# Patient Record
Sex: FEMALE | Race: OTHER | HISPANIC OR LATINO
[De-identification: names, ages, dates, MRNs, and addresses within clinical notes are randomized per-mention and may not be internally consistent; named-entity substitution may affect disease eponyms.]

---

## 2020-02-21 ENCOUNTER — APPOINTMENT (OUTPATIENT)
Dept: INTERNAL MEDICINE | Facility: CLINIC | Age: 60
End: 2020-02-21

## 2020-02-21 PROBLEM — Z00.00 ENCOUNTER FOR PREVENTIVE HEALTH EXAMINATION: Status: ACTIVE | Noted: 2020-02-21

## 2022-07-17 ENCOUNTER — INPATIENT (INPATIENT)
Facility: HOSPITAL | Age: 62
LOS: 3 days | Discharge: HOME | DRG: 312 | End: 2022-07-21
Attending: INTERNAL MEDICINE | Admitting: INTERNAL MEDICINE
Payer: MEDICAID

## 2022-07-17 VITALS
TEMPERATURE: 98 F | HEART RATE: 63 BPM | SYSTOLIC BLOOD PRESSURE: 132 MMHG | OXYGEN SATURATION: 100 % | HEIGHT: 68 IN | DIASTOLIC BLOOD PRESSURE: 80 MMHG | WEIGHT: 130.07 LBS | RESPIRATION RATE: 16 BRPM

## 2022-07-17 DIAGNOSIS — R55 SYNCOPE AND COLLAPSE: ICD-10-CM

## 2022-07-17 DIAGNOSIS — Z29.9 ENCOUNTER FOR PROPHYLACTIC MEASURES, UNSPECIFIED: ICD-10-CM

## 2022-07-17 DIAGNOSIS — E87.1 HYPO-OSMOLALITY AND HYPONATREMIA: ICD-10-CM

## 2022-07-17 DIAGNOSIS — G50.0 TRIGEMINAL NEURALGIA: ICD-10-CM

## 2022-07-17 LAB
ALBUMIN SERPL ELPH-MCNC: 3.6 G/DL — SIGNIFICANT CHANGE UP (ref 3.5–5)
ALP SERPL-CCNC: 125 U/L — HIGH (ref 40–120)
ALT FLD-CCNC: 23 U/L DA — SIGNIFICANT CHANGE UP (ref 10–60)
ANION GAP SERPL CALC-SCNC: 9 MMOL/L — SIGNIFICANT CHANGE UP (ref 5–17)
AST SERPL-CCNC: 19 U/L — SIGNIFICANT CHANGE UP (ref 10–40)
BASOPHILS # BLD AUTO: 0.05 K/UL — SIGNIFICANT CHANGE UP (ref 0–0.2)
BASOPHILS NFR BLD AUTO: 1.1 % — SIGNIFICANT CHANGE UP (ref 0–2)
BILIRUB SERPL-MCNC: 0.3 MG/DL — SIGNIFICANT CHANGE UP (ref 0.2–1.2)
BUN SERPL-MCNC: 13 MG/DL — SIGNIFICANT CHANGE UP (ref 7–18)
CALCIUM SERPL-MCNC: 9.5 MG/DL — SIGNIFICANT CHANGE UP (ref 8.4–10.5)
CHLORIDE SERPL-SCNC: 94 MMOL/L — LOW (ref 96–108)
CO2 SERPL-SCNC: 23 MMOL/L — SIGNIFICANT CHANGE UP (ref 22–31)
CREAT SERPL-MCNC: 0.78 MG/DL — SIGNIFICANT CHANGE UP (ref 0.5–1.3)
EGFR: 87 ML/MIN/1.73M2 — SIGNIFICANT CHANGE UP
EOSINOPHIL # BLD AUTO: 0.13 K/UL — SIGNIFICANT CHANGE UP (ref 0–0.5)
EOSINOPHIL NFR BLD AUTO: 2.8 % — SIGNIFICANT CHANGE UP (ref 0–6)
GLUCOSE SERPL-MCNC: 100 MG/DL — HIGH (ref 70–99)
HCT VFR BLD CALC: 36.6 % — SIGNIFICANT CHANGE UP (ref 34.5–45)
HGB BLD-MCNC: 12.7 G/DL — SIGNIFICANT CHANGE UP (ref 11.5–15.5)
IMM GRANULOCYTES NFR BLD AUTO: 0.4 % — SIGNIFICANT CHANGE UP (ref 0–1.5)
LYMPHOCYTES # BLD AUTO: 1.03 K/UL — SIGNIFICANT CHANGE UP (ref 1–3.3)
LYMPHOCYTES # BLD AUTO: 21.9 % — SIGNIFICANT CHANGE UP (ref 13–44)
MCHC RBC-ENTMCNC: 28.8 PG — SIGNIFICANT CHANGE UP (ref 27–34)
MCHC RBC-ENTMCNC: 34.7 GM/DL — SIGNIFICANT CHANGE UP (ref 32–36)
MCV RBC AUTO: 83 FL — SIGNIFICANT CHANGE UP (ref 80–100)
MONOCYTES # BLD AUTO: 0.28 K/UL — SIGNIFICANT CHANGE UP (ref 0–0.9)
MONOCYTES NFR BLD AUTO: 6 % — SIGNIFICANT CHANGE UP (ref 2–14)
NEUTROPHILS # BLD AUTO: 3.19 K/UL — SIGNIFICANT CHANGE UP (ref 1.8–7.4)
NEUTROPHILS NFR BLD AUTO: 67.8 % — SIGNIFICANT CHANGE UP (ref 43–77)
NRBC # BLD: 0 /100 WBCS — SIGNIFICANT CHANGE UP (ref 0–0)
PLATELET # BLD AUTO: 274 K/UL — SIGNIFICANT CHANGE UP (ref 150–400)
POTASSIUM SERPL-MCNC: 4.1 MMOL/L — SIGNIFICANT CHANGE UP (ref 3.5–5.3)
POTASSIUM SERPL-SCNC: 4.1 MMOL/L — SIGNIFICANT CHANGE UP (ref 3.5–5.3)
PROT SERPL-MCNC: 6.8 G/DL — SIGNIFICANT CHANGE UP (ref 6–8.3)
RBC # BLD: 4.41 M/UL — SIGNIFICANT CHANGE UP (ref 3.8–5.2)
RBC # FLD: 13.4 % — SIGNIFICANT CHANGE UP (ref 10.3–14.5)
SARS-COV-2 RNA SPEC QL NAA+PROBE: SIGNIFICANT CHANGE UP
SODIUM SERPL-SCNC: 126 MMOL/L — LOW (ref 135–145)
TROPONIN I, HIGH SENSITIVITY RESULT: 5.6 NG/L — SIGNIFICANT CHANGE UP
WBC # BLD: 4.7 K/UL — SIGNIFICANT CHANGE UP (ref 3.8–10.5)
WBC # FLD AUTO: 4.7 K/UL — SIGNIFICANT CHANGE UP (ref 3.8–10.5)

## 2022-07-17 PROCEDURE — 93010 ELECTROCARDIOGRAM REPORT: CPT

## 2022-07-17 PROCEDURE — 99285 EMERGENCY DEPT VISIT HI MDM: CPT

## 2022-07-17 PROCEDURE — 70450 CT HEAD/BRAIN W/O DYE: CPT | Mod: 26

## 2022-07-17 PROCEDURE — 71045 X-RAY EXAM CHEST 1 VIEW: CPT | Mod: 26

## 2022-07-17 RX ORDER — CLONAZEPAM 1 MG
0.5 TABLET ORAL DAILY
Refills: 0 | Status: DISCONTINUED | OUTPATIENT
Start: 2022-07-17 | End: 2022-07-19

## 2022-07-17 RX ORDER — ACETAMINOPHEN 500 MG
650 TABLET ORAL EVERY 6 HOURS
Refills: 0 | Status: DISCONTINUED | OUTPATIENT
Start: 2022-07-17 | End: 2022-07-21

## 2022-07-17 RX ORDER — LANOLIN ALCOHOL/MO/W.PET/CERES
3 CREAM (GRAM) TOPICAL AT BEDTIME
Refills: 0 | Status: DISCONTINUED | OUTPATIENT
Start: 2022-07-17 | End: 2022-07-21

## 2022-07-17 RX ORDER — POLYETHYLENE GLYCOL 3350 17 G/17G
17 POWDER, FOR SOLUTION ORAL DAILY
Refills: 0 | Status: DISCONTINUED | OUTPATIENT
Start: 2022-07-17 | End: 2022-07-21

## 2022-07-17 RX ORDER — OXCARBAZEPINE 300 MG/1
600 TABLET, FILM COATED ORAL
Refills: 0 | Status: DISCONTINUED | OUTPATIENT
Start: 2022-07-17 | End: 2022-07-21

## 2022-07-17 RX ORDER — SENNA PLUS 8.6 MG/1
2 TABLET ORAL AT BEDTIME
Refills: 0 | Status: DISCONTINUED | OUTPATIENT
Start: 2022-07-17 | End: 2022-07-21

## 2022-07-17 RX ORDER — ONDANSETRON 8 MG/1
4 TABLET, FILM COATED ORAL EVERY 8 HOURS
Refills: 0 | Status: DISCONTINUED | OUTPATIENT
Start: 2022-07-17 | End: 2022-07-21

## 2022-07-17 RX ORDER — SODIUM CHLORIDE 9 MG/ML
1000 INJECTION, SOLUTION INTRAVENOUS
Refills: 0 | Status: DISCONTINUED | OUTPATIENT
Start: 2022-07-17 | End: 2022-07-19

## 2022-07-17 RX ORDER — ENOXAPARIN SODIUM 100 MG/ML
40 INJECTION SUBCUTANEOUS EVERY 24 HOURS
Refills: 0 | Status: DISCONTINUED | OUTPATIENT
Start: 2022-07-17 | End: 2022-07-21

## 2022-07-17 RX ORDER — AMITRIPTYLINE HCL 25 MG
50 TABLET ORAL DAILY
Refills: 0 | Status: DISCONTINUED | OUTPATIENT
Start: 2022-07-17 | End: 2022-07-21

## 2022-07-17 RX ORDER — SERTRALINE 25 MG/1
100 TABLET, FILM COATED ORAL DAILY
Refills: 0 | Status: DISCONTINUED | OUTPATIENT
Start: 2022-07-17 | End: 2022-07-21

## 2022-07-17 RX ADMIN — SENNA PLUS 2 TABLET(S): 8.6 TABLET ORAL at 21:54

## 2022-07-17 RX ADMIN — SERTRALINE 100 MILLIGRAM(S): 25 TABLET, FILM COATED ORAL at 18:50

## 2022-07-17 RX ADMIN — Medication 50 MILLIGRAM(S): at 18:49

## 2022-07-17 RX ADMIN — POLYETHYLENE GLYCOL 3350 17 GRAM(S): 17 POWDER, FOR SOLUTION ORAL at 19:02

## 2022-07-17 RX ADMIN — OXCARBAZEPINE 600 MILLIGRAM(S): 300 TABLET, FILM COATED ORAL at 17:44

## 2022-07-17 RX ADMIN — Medication 0.5 MILLIGRAM(S): at 22:01

## 2022-07-17 RX ADMIN — SODIUM CHLORIDE 60 MILLILITER(S): 9 INJECTION, SOLUTION INTRAVENOUS at 17:44

## 2022-07-17 NOTE — H&P ADULT - NSHPREVIEWOFSYSTEMS_GEN_ALL_CORE
CONSTITUTIONAL: No fever, weight loss, or fatigue  RESPIRATORY: No cough, wheezing, chills or hemoptysis; No shortness of breath  CARDIOVASCULAR: No chest pain, palpitations, dizziness, or leg swelling  GASTROINTESTINAL: No abdominal pain. No nausea, vomiting, or hematemesis; No diarrhea or constipation. No melena or hematochezia.  GENITOURINARY: No dysuria or hematuria, urinary frequency  NEUROLOGICAL: No headaches, memory loss, loss of strength, numbness, or tremors  ENDOCRINE: No polyuria, polydipsia, or heat/cold intolerance  MUSKULOSKELETAL: No muscle aches, joint pains  HEME: no easy bruisability, no tender or enlarged lymph nodes  SKIN: No itching, burning, rashes, or lesions .

## 2022-07-17 NOTE — PATIENT PROFILE ADULT - NSPROGENPREVTRANSF_GEN_A_NUR
CARDIAC/PULMONARY REHAB NUTRITION EDUCATION/ASSESSMENT    NUTRITION EDUCATION CLASS:     Attended 10/14/18     Reviewed AHA guidelines,The Mediterranean diet and DASH principles. Discussed the merits of following a plant based diet. A time line of heart healthy recommendations with regard to clinical research was.provided.Grocery shopping guidelines and the food label were discussed. Supportive written information was provided.     10:58 AM  11/14/2018  Yumiko Ramírez RD                                                              11:27 AM  11/14/2018  Yumiko Ramírez RD                         no history of blood product transfusion

## 2022-07-17 NOTE — ED ADULT NURSE REASSESSMENT NOTE - NS ED NURSE REASSESS COMMENT FT1
Pt resting in bed, A&OX3, admitted to tele service, awaiting floor bed. No acute distress noted, denies chest pain, no shortness of breath indicated.

## 2022-07-17 NOTE — H&P ADULT - ATTENDING COMMENTS
Pt's syncope appears clinically consistent with a vasovagal syncope (as it was immediately post bm /micturition. Will however r/o pt for any arrythmia with full syncope w/u in telemetry. Will f/u a.m. cortisol and renin / aldosterone levels. Pt's syncope appears clinically consistent with a vasovagal syncope (as it was immediately post bm /micturition) hence will monitor orthostatics closely. However will also r/o pt for any arrythmia with full syncope w/u in telemetry. Will f/u a.m. cortisol and renin / aldosterone levels.

## 2022-07-17 NOTE — ED ADULT NURSE NOTE - NSIMPLEMENTINTERV_GEN_ALL_ED
Implemented All Fall Risk Interventions:  [place] to call system. Call bell, personal items and telephone within reach. Instruct patient to call for assistance. Room bathroom lighting operational. Non-slip footwear when patient is off stretcher. Physically safe environment: no spills, clutter or unnecessary equipment. Stretcher in lowest position, wheels locked, appropriate side rails in place. Provide visual cue, wrist band, yellow gown, etc. Monitor gait and stability. Monitor for mental status changes and reorient to person, place, and time. Review medications for side effects contributing to fall risk. Reinforce activity limits and safety measures with patient and family.

## 2022-07-17 NOTE — H&P ADULT - HISTORY OF PRESENT ILLNESS
This is a 62 year old female with atypical trigeminal neuralgia coming in for syncope. Pt states that earlier today she went to the bathroom and was straining on the toilet and started to get lightheaded. She states she felt light headed and tried to sit on the bed and passed out. Her  who was at home states he heard a bang while he was in the kitchen. Went to check in on her and found her on the ground. Pt does not recall the event and woke up on the bed. She denies any headaches, visual disturbances, N/V/D,  falls, chest pain, palpitations, lower extremity swelling, fevers, skin rash, recent travel, or sick contacts

## 2022-07-17 NOTE — H&P ADULT - PROBLEM SELECTOR PLAN 2
- Pt has a history of Trigeminal neuralgia.   - Will continue pts home medication Amitriptyline 50mg and oxcarbazepine . p/w hyponatremia  do not correct by more than 8 meq in 24hr  Goal Na: 134  BMP q6h  c/w ivf for now  send serum osm, urine osm, urine na  - Nephro - Dr. Parisi consulted.

## 2022-07-17 NOTE — PATIENT PROFILE ADULT - FALL HARM RISK - HARM RISK INTERVENTIONS

## 2022-07-17 NOTE — ED PROVIDER NOTE - PROGRESS NOTE DETAILS
Patient with hyponatremia, possibly due to carbamazepine she takes for trigeminal neuralgia. Will admit for IVF.

## 2022-07-17 NOTE — H&P ADULT - NSHPPHYSICALEXAM_GEN_ALL_CORE
Vital Signs Last 24 Hrs  T(C): 36.4 (17 Jul 2022 10:07), Max: 36.4 (17 Jul 2022 10:07)  T(F): 97.6 (17 Jul 2022 10:07), Max: 97.6 (17 Jul 2022 10:07)  HR: 63 (17 Jul 2022 10:07) (63 - 63)  BP: 132/80 (17 Jul 2022 10:07) (132/80 - 132/80)  BP(mean): --  RR: 16 (17 Jul 2022 10:07) (16 - 16)  SpO2: 100% (17 Jul 2022 10:07) (100% - 100%)    Parameters below as of 17 Jul 2022 10:07  Patient On (Oxygen Delivery Method): room air    PHYSICAL EXAM:  GENERAL: NAD, speaks in full sentences, no signs of respiratory distress  HEAD:  Atraumatic, Normocephalic  EYES: EOMI, PERRLA, conjunctiva and sclera clear  NECK: Supple, No JVD  CHEST/LUNG: Clear to auscultation bilaterally; No wheeze; No crackles; No accessory muscles used  HEART: Regular rate and rhythm; No murmurs;   ABDOMEN: Soft, Nontender, Nondistended; Bowel sounds present; No guarding  EXTREMITIES:  2+ Peripheral Pulses, No cyanosis or edema  PSYCH: AAOx3  NEUROLOGY: non-focal  SKIN: No rashes or lesions

## 2022-07-17 NOTE — ED PROVIDER NOTE - OBJECTIVE STATEMENT
Patient reports she woke up this morning feeling generally weak. Did not sleep much last night due to trigeminal neuralgia. Strained to have a bowel movement and then felt weaker afterwards. About a minute later while walking, she felt nauseous and passed out. Immediately oriented upon awakening. No fever, ha, cp, sob, ap, v/d, focal weakness, paresthesias. Patient has felt generally weak with loss of appetite for 3-4 years. Has seen multiple specialists (neurologist, cardiologist, pulmonologist) with no diagnosis. Had stress test and echocardiogram in 01/2022, normal. Also had MRI brain and CT chest in the past year. MRI brain normal. CT chest showed some nodules that her pulmonologist is following. Another neurologist told her she might not have trigeminal neuralgia but instead might have a neuropathy of the vagus.

## 2022-07-17 NOTE — H&P ADULT - PROBLEM SELECTOR PLAN 3
- lovenox - DVT - Pt has a history of Trigeminal neuralgia.   - Will continue pts home medication Amitriptyline 50mg and oxcarbazepine .

## 2022-07-17 NOTE — ED ADULT NURSE NOTE - OBJECTIVE STATEMENT
Pt BIB EMS s/p syncopal episode at home. Pt states she woke up generally weak, wanted to have BM while straining, felt weaker afterwards and couple of minutes later passed out. Upon assessment, pt is A&OX3, denies chest pain, no shortness of breath indicated.

## 2022-07-18 LAB
A1C WITH ESTIMATED AVERAGE GLUCOSE RESULT: 5.3 % — SIGNIFICANT CHANGE UP (ref 4–5.6)
ANION GAP SERPL CALC-SCNC: 7 MMOL/L — SIGNIFICANT CHANGE UP (ref 5–17)
BUN SERPL-MCNC: 10 MG/DL — SIGNIFICANT CHANGE UP (ref 7–18)
CALCIUM SERPL-MCNC: 9.4 MG/DL — SIGNIFICANT CHANGE UP (ref 8.4–10.5)
CHLORIDE SERPL-SCNC: 98 MMOL/L — SIGNIFICANT CHANGE UP (ref 96–108)
CO2 SERPL-SCNC: 26 MMOL/L — SIGNIFICANT CHANGE UP (ref 22–31)
CORTIS AM PEAK SERPL-MCNC: 14 UG/DL — SIGNIFICANT CHANGE UP (ref 6–18.4)
CREAT SERPL-MCNC: 0.75 MG/DL — SIGNIFICANT CHANGE UP (ref 0.5–1.3)
EGFR: 91 ML/MIN/1.73M2 — SIGNIFICANT CHANGE UP
ESTIMATED AVERAGE GLUCOSE: 105 MG/DL — SIGNIFICANT CHANGE UP (ref 68–114)
GLUCOSE SERPL-MCNC: 57 MG/DL — LOW (ref 70–99)
HCT VFR BLD CALC: 38.8 % — SIGNIFICANT CHANGE UP (ref 34.5–45)
HCV AB S/CO SERPL IA: 0.09 S/CO — SIGNIFICANT CHANGE UP (ref 0–0.99)
HCV AB SERPL-IMP: SIGNIFICANT CHANGE UP
HGB BLD-MCNC: 13.2 G/DL — SIGNIFICANT CHANGE UP (ref 11.5–15.5)
MAGNESIUM SERPL-MCNC: 2.3 MG/DL — SIGNIFICANT CHANGE UP (ref 1.6–2.6)
MAGNESIUM SERPL-MCNC: 2.4 MG/DL — SIGNIFICANT CHANGE UP (ref 1.6–2.6)
MCHC RBC-ENTMCNC: 28.9 PG — SIGNIFICANT CHANGE UP (ref 27–34)
MCHC RBC-ENTMCNC: 34 GM/DL — SIGNIFICANT CHANGE UP (ref 32–36)
MCV RBC AUTO: 84.9 FL — SIGNIFICANT CHANGE UP (ref 80–100)
NRBC # BLD: 0 /100 WBCS — SIGNIFICANT CHANGE UP (ref 0–0)
OSMOLALITY SERPL: 271 MOSMOL/KG — LOW (ref 280–301)
PHOSPHATE SERPL-MCNC: 3.6 MG/DL — SIGNIFICANT CHANGE UP (ref 2.5–4.5)
PHOSPHATE SERPL-MCNC: 4.2 MG/DL — SIGNIFICANT CHANGE UP (ref 2.5–4.5)
PLATELET # BLD AUTO: 300 K/UL — SIGNIFICANT CHANGE UP (ref 150–400)
POTASSIUM SERPL-MCNC: 3.9 MMOL/L — SIGNIFICANT CHANGE UP (ref 3.5–5.3)
POTASSIUM SERPL-SCNC: 3.9 MMOL/L — SIGNIFICANT CHANGE UP (ref 3.5–5.3)
RBC # BLD: 4.57 M/UL — SIGNIFICANT CHANGE UP (ref 3.8–5.2)
RBC # FLD: 14 % — SIGNIFICANT CHANGE UP (ref 10.3–14.5)
SODIUM SERPL-SCNC: 131 MMOL/L — LOW (ref 135–145)
SODIUM UR-SCNC: 55 MMOL/L — SIGNIFICANT CHANGE UP
TSH SERPL-MCNC: 2.3 UU/ML — SIGNIFICANT CHANGE UP (ref 0.34–4.82)
WBC # BLD: 4.79 K/UL — SIGNIFICANT CHANGE UP (ref 3.8–10.5)
WBC # FLD AUTO: 4.79 K/UL — SIGNIFICANT CHANGE UP (ref 3.8–10.5)

## 2022-07-18 PROCEDURE — 99223 1ST HOSP IP/OBS HIGH 75: CPT

## 2022-07-18 RX ORDER — SODIUM CHLORIDE 9 MG/ML
1000 INJECTION INTRAMUSCULAR; INTRAVENOUS; SUBCUTANEOUS
Refills: 0 | Status: DISCONTINUED | OUTPATIENT
Start: 2022-07-18 | End: 2022-07-19

## 2022-07-18 RX ADMIN — POLYETHYLENE GLYCOL 3350 17 GRAM(S): 17 POWDER, FOR SOLUTION ORAL at 11:15

## 2022-07-18 RX ADMIN — SODIUM CHLORIDE 60 MILLILITER(S): 9 INJECTION INTRAMUSCULAR; INTRAVENOUS; SUBCUTANEOUS at 17:05

## 2022-07-18 RX ADMIN — OXCARBAZEPINE 600 MILLIGRAM(S): 300 TABLET, FILM COATED ORAL at 06:42

## 2022-07-18 RX ADMIN — Medication 50 MILLIGRAM(S): at 21:00

## 2022-07-18 RX ADMIN — SERTRALINE 100 MILLIGRAM(S): 25 TABLET, FILM COATED ORAL at 21:00

## 2022-07-18 RX ADMIN — OXCARBAZEPINE 600 MILLIGRAM(S): 300 TABLET, FILM COATED ORAL at 17:05

## 2022-07-18 RX ADMIN — Medication 0.5 MILLIGRAM(S): at 21:00

## 2022-07-18 RX ADMIN — SENNA PLUS 2 TABLET(S): 8.6 TABLET ORAL at 21:01

## 2022-07-18 RX ADMIN — ENOXAPARIN SODIUM 40 MILLIGRAM(S): 100 INJECTION SUBCUTANEOUS at 06:42

## 2022-07-18 NOTE — PROGRESS NOTE ADULT - PROBLEM SELECTOR PLAN 1
p/w syncope lightheadedness  orthostatic bp  f/u echo   PT consult  f/u EKG  f/u UA  f/u CT head   Cardio consulted: Dr. Castañeda. p/w syncope lightheadedness, most likely vasovagal episode considering that the patient passed a bowel movement yesterday before the episode  orthostatic bp negative, recent BPs stable, no recent episodes of hypotension today   TTE 7/18 WNL   PT 7/18 evaluation- recommended outpatient PT, 3-4x/week, for 4 weeks  EKG 7/17 normal sinus rhythm   UA 7/17   f/u CT head   Cardio consulted: Dr. Castañeda. p/w syncope lightheadedness, most likely vasovagal episode considering that the patient passed a bowel movement yesterday before the episode  orthostatic bp negative, recent BPs stable, no recent episodes of hypotension today   TTE 7/17 WNL   EKG 7/17- normal sinus rhythm   UA 7/17-   CT head 7/17-  PT 7/18 evaluation- recommended outpatient PT, 3-4x/week, for 4 weeks  Cardio on board Dr. Castañeda. p/w syncope lightheadedness, most likely vasovagal episode considering that the patient passed a bowel movement yesterday before the syncopal episode  orthostatic bp negative, recent BPs stable, no recent episodes of hypotension today   - TTE 7/17 WNL   - EKG 7/17- normal sinus rhythm   - pending UA  - CT head 7/17- no acute intracranial pathology   - PT 7/18 evaluation- recommended outpatient PT, 3-4x/week, for 4 weeks  - Cardio on board Dr. Castañeda

## 2022-07-18 NOTE — CONSULT NOTE ADULT - SUBJECTIVE AND OBJECTIVE BOX
CHIEF COMPLAINT:Patient is a 62y old  Female who presents with a chief complaint of syncope (18 Jul 2022 05:45)      HPI:  This is a 62 year old female with atypical trigeminal neuralgia,HTN  coming in for syncope. Pt states that earlier today she went to the bathroom and was straining on the toilet and started to get lightheaded. She states she felt light headed and tried to sit on the bed and passed out. Her  who was at home states he heard a bang while he was in the kitchen. Went to check in on her and found her on the ground. Pt does not recall the event and woke up on the bed. She denies any headaches, visual disturbances, N/V/D,  falls, chest pain, palpitations, lower extremity swelling, fevers, skin rash, recent travel, or sick contacts.She has had decrease exercise tolerance and chest pain, saw Cardiologist at [facility], negative cardiac work-up.   (17 Jul 2022 13:24)      PAST MEDICAL & SURGICAL HISTORY:  Trigeminal neuralgia      HTN-was on ace now off          MEDICATIONS  (STANDING):  amitriptyline 50 milliGRAM(s) Oral daily  clonazePAM  Tablet 0.5 milliGRAM(s) Oral daily  enoxaparin Injectable 40 milliGRAM(s) SubCutaneous every 24 hours  lactated ringers. 1000 milliLiter(s) (60 mL/Hr) IV Continuous <Continuous>  OXcarbazepine 600 milliGRAM(s) Oral two times a day  polyethylene glycol 3350 17 Gram(s) Oral daily  senna 2 Tablet(s) Oral at bedtime  sertraline 100 milliGRAM(s) Oral daily    MEDICATIONS  (PRN):  acetaminophen     Tablet .. 650 milliGRAM(s) Oral every 6 hours PRN Temp greater or equal to 38C (100.4F), Mild Pain (1 - 3)  aluminum hydroxide/magnesium hydroxide/simethicone Suspension 30 milliLiter(s) Oral every 4 hours PRN Dyspepsia  melatonin 3 milliGRAM(s) Oral at bedtime PRN Insomnia  ondansetron Injectable 4 milliGRAM(s) IV Push every 8 hours PRN Nausea and/or Vomiting      FAMILY HISTORY:  Father-CAD        SOCIAL HISTORY:    [x ] Non-smoker    [x ] Alcohol-denies    Allergies    No Known Allergies    Intolerances    	    REVIEW OF SYSTEMS:  CONSTITUTIONAL: No fever, weight loss, or fatigue  EYES: No eye pain, visual disturbances, or discharge  ENT:  No difficulty hearing, tinnitus, vertigo; No sinus or throat pain  NECK: No pain or stiffness  RESPIRATORY: No cough, wheezing, chills or hemoptysis; No Shortness of Breath  CARDIOVASCULAR: No chest pain, palpitations,+ passing out, +dizziness, Jose leg swelling  GASTROINTESTINAL: No abdominal or epigastric pain. No nausea, vomiting, or hematemesis; No diarrhea or constipation. No melena or hematochezia.  GENITOURINARY: No dysuria, frequency, hematuria, or incontinence  NEUROLOGICAL: No headaches, memory loss, loss of strength, numbness, or tremors  SKIN: No itching, burning, rashes, or lesions   LYMPH Nodes: No enlarged glands  ENDOCRINE: No heat or cold intolerance; No hair loss  MUSCULOSKELETAL: No joint pain or swelling; No muscle, back, or extremity pain  PSYCHIATRIC: No depression, anxiety, mood swings, or difficulty sleeping  HEME/LYMPH: No easy bruising, or bleeding gums  ALLERGY AND IMMUNOLOGIC: No hives or eczema	    PHYSICAL EXAM:  T(C): 36.3 (07-18-22 @ 04:11), Max: 37.1 (07-17-22 @ 15:24)  HR: 78 (07-18-22 @ 07:30) (63 - 78)  BP: 116/63 (07-18-22 @ 07:30) (111/56 - 157/90)  RR: 19 (07-18-22 @ 07:30) (16 - 19)  SpO2: 100% (07-18-22 @ 07:30) (99% - 100%)  Wt(kg): --  I&O's Summary    17 Jul 2022 07:01  -  18 Jul 2022 07:00  --------------------------------------------------------  IN: 200 mL / OUT: 0 mL / NET: 200 mL        Appearance: Normal	  HEENT:   Normal oral mucosa, PERRL, EOMI	  Lymphatic: No lymphadenopathy  Cardiovascular: Normal S1 S2, No JVD, No murmurs, No edema  Respiratory: Lungs clear to auscultation	  Psychiatry: A & O x 3, Mood & affect appropriate  Gastrointestinal:  Soft, Non-tender, + BS	  Skin: No rashes, No ecchymoses, No cyanosis	  Neurologic: Non-focal  Extremities: Normal range of motion, No clubbing, cyanosis or edema  Vascular: Peripheral pulses palpable 2+ bilaterally    	    ECG:  nsr,low voltage,rsr' v1 and v2	  	  	  LABS:	 	      Troponin I, High Sensitivity Result: 5.6 ng/L (07-17-22 @ 10:41)                          12.7   4.70  )-----------( 274      ( 17 Jul 2022 10:41 )             36.6     07-17    126<L>  |  94<L>  |  13  ----------------------------<  100<H>  4.1   |  23  |  0.78    Ca    9.5      17 Jul 2022 10:41  Phos  4.2     07-18  Mg     2.4     07-18    TPro  6.8  /  Alb  3.6  /  TBili  0.3  /  DBili  x   /  AST  19  /  ALT  23  /  AlkPhos  125<H>  07-17       TSH: Thyroid Stimulating Hormone, Serum: 2.30 uU/mL (07-18 @ 06:38)    < from: CT Head No Cont (07.17.22 @ 14:15) >  ACC: [id number] EXAM:  CT BRAIN                          PROCEDURE DATE:  07/17/2022          INTERPRETATION:  CLINICAL INDICATION: Fell and hit head.    TECHNIQUE: Axial CT scanning of the brain was obtained from the skull   base to the vertex without the administration of intravenous contrast.   Reformatted coronal and sagittal images were subsequently obtained and   reviewed.    COMPARISON: None    FINDINGS:  There is no CT evidence of acute transcortical infarct.    There is no hydrocephalus,mass effect, or acute intracranial hemorrhage.   No extra-axial collection. Basal cisterns are patent.    The visualized paranasal sinuses and mastoid air cells are clear.    The calvarium is intact.    IMPRESSION:  No evidence of acute transcorticalinfarct, acute intracranial   hemorrhage, or mass effect.    --- End of Report ---            FRANK OLEARY MD; Attending Radiologist  This document has been electronically signed. Jul 17 2022  2:43PM    < end of copied text >  < from: Transthoracic Echocardiogram (07.17.22 @ 14:09) >  OBSERVATIONS:  Mitral Valve: Normal mitral valve.  Aortic Root: Aortic Root: 3.2 cm.    Aortic Valve: Normal trileaflet aortic valve.  Left Atrium: Normal left atrium.  LA volume index = 27  cc/m2.  Left Ventricle: Normal Left Ventricular Systolic Function,  (EF = 55 to 60%) Normal left ventricular internal  dimensions and wall thicknesses. Normal diastolic function.  Right Heart: Normal right atrium. Normal right ventricular  size andsystolic function (TAPSE  2.4cm). Normal tricuspid  valve. Normal pulmonic valve.  Pericardium/PleuraSmall pericardial effusion,greatest  diameter 1.0cm..  Hemodynamic: Unable to estimate RVSP.    < end of copied text >

## 2022-07-18 NOTE — PROGRESS NOTE ADULT - PROBLEM SELECTOR PLAN 3
- Pt has a history of Trigeminal neuralgia.   - Will continue pts home medication Amitriptyline 50mg and oxcarbazepine . Pt has a history of Trigeminal neuralgia.   - Will continue pt's home medication Amitriptyline 50mg and oxcarbazepine

## 2022-07-18 NOTE — CONSULT NOTE ADULT - SUBJECTIVE AND OBJECTIVE BOX
NEUROLOGY CONSULT NOTE    NAME:  MARBELLA CALDWELL      ASSESSMENT:  62 RHF with with trigeminal neuralgia presenting following an episode of lightheadedness followed by loss of consciousness for an uncertain duration, raising concern for syncopal event vs. new-onset seizure; also with tandem gait difficulty raising concern for subacute posterior circulation ischemic stroke      RECOMMENDATIONS:    - Routine EEG approved to evaluate for seizure tendency    - MRI Brain w/wo Gadolinium (Epilepsy protocol) to evaluate for intracranial abnormalities    - Continue cardiovascular workup: Telemetry monitoring, Echocardiogram, Cardiology follow-up    - Monitor orthostatic BP & HR with each vital signs check    - Plentiful fluid hydration (2 liters, or 8 glasses, of water daily) encouraged    - Patient counseled to maintain caution with rapid changes in position    - PT/OT to help with recovery of gait and balance    - DVT ppx: SCDs, Enoxaparin        NOTE TO BE COMPLETED - PLEASE REFER TO ABOVE ONLY AND IGNORE INFORMATION BELOW    *******************************      CHIEF COMPLAINT:  Patient is a 62y old  Female who presents with a chief complaint of syncope (18 Jul 2022 15:23)      HPI:  This is a 62 year old female with atypical trigeminal neuralgia coming in for syncope. Pt states that earlier today she went to the bathroom and was straining on the toilet and started to get lightheaded. She states she felt light headed and tried to sit on the bed and passed out. Her  who was at home states he heard a bang while he was in the kitchen. Went to check in on her and found her on the ground. Pt does not recall the event and woke up on the bed. She denies any headaches, visual disturbances, N/V/D,  falls, chest pain, palpitations, lower extremity swelling, fevers, skin rash, recent travel, or sick contacts   (17 Jul 2022 13:24)      NEURO HPI:      PAST MEDICAL & SURGICAL HISTORY:  Trigeminal neuralgia      No significant past surgical history          MEDICATIONS:  acetaminophen     Tablet .. 650 milliGRAM(s) Oral every 6 hours PRN  aluminum hydroxide/magnesium hydroxide/simethicone Suspension 30 milliLiter(s) Oral every 4 hours PRN  amitriptyline 50 milliGRAM(s) Oral daily  clonazePAM  Tablet 0.5 milliGRAM(s) Oral daily  enoxaparin Injectable 40 milliGRAM(s) SubCutaneous every 24 hours  lactated ringers. 1000 milliLiter(s) IV Continuous <Continuous>  melatonin 3 milliGRAM(s) Oral at bedtime PRN  ondansetron Injectable 4 milliGRAM(s) IV Push every 8 hours PRN  OXcarbazepine 600 milliGRAM(s) Oral two times a day  polyethylene glycol 3350 17 Gram(s) Oral daily  senna 2 Tablet(s) Oral at bedtime  sertraline 100 milliGRAM(s) Oral daily  sodium chloride 0.9%. 1000 milliLiter(s) IV Continuous <Continuous>      ALLERGIES:  No Known Allergies      FAMILY HISTORY:  No pertinent family history in first degree relatives          SOCIAL HISTORY:  Denies alcohol, tobacco, or illicit drug use    REVIEW OF SYSTEMS:  GENERAL: No fever, weight changes, fatigue  EYES: No eye pain or discharge  EAR/NOSE/MOUTH/THROAT: No sinus or throat pain; No difficulty hearing  NECK: No pain or stiffness  RESPIRATORY: No cough, wheezing, chills, or hemoptysis  CARDIOVASCULAR: No chest pain, palpitations, shortness of breath, or dyspnea on exertion  GASTROINTESTINAL: No abdominal pain, nausea, vomiting, hematemesis, diarrhea, or constipation  GENITOURINARY: No dysuria, frequency, hematuria, or incontinence  SKIN: No rashes or lesions  ENDOCRINE: No heat or cold intolerance  HEMATOLOGIC: No easy bruising or bleeding  PSYCHIATRIC: No depression, anxiety, or mood swings  MUSCULOSKELETAL: No joint pain or swelling  NEUROLOGICAL: As per HPI        OBJECTIVE:    Vital Signs Last 24 Hrs  T(C): 36.8 (18 Jul 2022 19:42), Max: 36.8 (18 Jul 2022 19:42)  T(F): 98.2 (18 Jul 2022 19:42), Max: 98.2 (18 Jul 2022 19:42)  HR: 83 (18 Jul 2022 19:42) (68 - 83)  BP: 131/85 (18 Jul 2022 19:42) (94/44 - 137/85)  BP(mean): --  RR: 18 (18 Jul 2022 19:42) (18 - 19)  SpO2: 100% (18 Jul 2022 19:42) (99% - 100%)    Parameters below as of 18 Jul 2022 19:42  Patient On (Oxygen Delivery Method): room air        General Examination:  General: No acute distress  HEENT: Atraumatic, Normocephalic  Respiratory: CTA B/l.  No crackles, rhonchi, or wheezes.  Cardiovascular: RRR.  Normal S1 & S2.  Normal b/l radial and pedal pulses.    Neurological Examination:  General / Mental Status: AAO x 3.  No aphasia or dysarthria.  Naming and repetition intact.  Cranial Nerves: VFF x 4.  PERRL.  EOMI x 2, No nystagmus or diplopia.  B/l V1-V3 equal and intact to light touch and pinprick.  Symmetric facial movement and palate elevation.  B/l hearing equal to finger rub.  5/5 strength with b/l sternocleidomastoid & trapezius.  Midline tongue protrusion, with no atrophy or fasciculations.  Motor: Normal bulk & tone in all four extremities.  5/5 strength throughout all four extremities.  No downward drift, rigidity, spasticity, or tremors in any of the four extremities.  Sensory: Intact to light touch and pinprick in all four extremities.  Negative Romberg.  Reflex: 2+ and symmetric at b/l biceps, triceps, brachioradialis, patellae, and ankles.  Downgoing toes b/l.  Coordination: No dysmetria with b/l finger-to-nose and heel raise tests.  Symmetric rapid alternating movements b/l.  Gait: Normal, narrow-based gait.  No difficulty with tiptoe, heel, and tandem gaits.        LABORATORY VALUES:                        13.2   4.79  )-----------( 300      ( 18 Jul 2022 10:04 )             38.8       07-18    131<L>  |  98  |  10  ----------------------------<  57<L>  3.9   |  26  |  0.75    Ca    9.4      18 Jul 2022 10:04  Phos  3.6     07-18  Mg     2.3     07-18    TPro  6.8  /  Alb  3.6  /  TBili  0.3  /  DBili  x   /  AST  19  /  ALT  23  /  AlkPhos  125<H>  07-17              NEUROIMAGING:          Please contact the Neurology consult service with any neurological questions.    Kalia Esparza MD   of Neurology  [facility] at [facility] NEUROLOGY CONSULT NOTE    NAME:  MARBELLA CALDWELL      ASSESSMENT:  62 RHF with with trigeminal neuralgia presenting following an episode of lightheadedness followed by loss of consciousness for an uncertain duration, raising concern for syncopal event vs. new-onset seizure; also with tandem gait difficulty raising concern for subacute posterior circulation ischemic stroke      RECOMMENDATIONS:    - Routine EEG approved to evaluate for seizure tendency    - MRI Brain w/wo Gadolinium (Epilepsy protocol) to evaluate for intracranial abnormalities    - Continue cardiovascular workup: Telemetry monitoring, Echocardiogram, Cardiology follow-up    - Monitor orthostatic BP & HR with each vital signs check    - Plentiful fluid hydration (2 liters, or 8 glasses, of water daily) encouraged    - Patient counseled to maintain caution with rapid changes in position    - PT/OT to help with recovery of gait and balance    - DVT ppx: SCDs, Enoxaparin          *******************************      CHIEF COMPLAINT:  Patient is a 62y old  Female who presents with a chief complaint of syncope (18 Jul 2022 15:23)      HPI:  This is a 62 year old female with atypical trigeminal neuralgia coming in for syncope. Pt states that earlier today she went to the bathroom and was straining on the toilet and started to get lightheaded. She states she felt lightheaded and tried to sit on the bed and passed out. Her  who was at home states he heard a bang while he was in the kitchen. Went to check in on her and found her on the ground. Pt does not recall the event and woke up on the bed. She denies any headaches, visual disturbances, N/V/D,  falls, chest pain, palpitations, lower extremity swelling, fevers, skin rash, recent travel, or sick contacts. (17 Jul 2022 13:24)      NEURO HPI:  62 RHF with trigeminal neuralgia, on Oxcarbazepine 600mg PO BID, who presented after having an episode of lightheadedness followed by loss of consciousness for an uncertain period of time.      PAST MEDICAL & SURGICAL HISTORY:  Trigeminal neuralgia  No significant past surgical history      MEDICATIONS:  acetaminophen     Tablet .. 650 milliGRAM(s) Oral every 6 hours PRN  aluminum hydroxide/magnesium hydroxide/simethicone Suspension 30 milliLiter(s) Oral every 4 hours PRN  amitriptyline 50 milliGRAM(s) Oral daily  clonazePAM  Tablet 0.5 milliGRAM(s) Oral daily  enoxaparin Injectable 40 milliGRAM(s) SubCutaneous every 24 hours  lactated ringers. 1000 milliLiter(s) IV Continuous <Continuous>  melatonin 3 milliGRAM(s) Oral at bedtime PRN  ondansetron Injectable 4 milliGRAM(s) IV Push every 8 hours PRN  OXcarbazepine 600 milliGRAM(s) Oral two times a day  polyethylene glycol 3350 17 Gram(s) Oral daily  senna 2 Tablet(s) Oral at bedtime  sertraline 100 milliGRAM(s) Oral daily  sodium chloride 0.9%. 1000 milliLiter(s) IV Continuous <Continuous>      ALLERGIES:  No Known Allergies      FAMILY HISTORY:  No pertinent family history in first degree relatives      SOCIAL HISTORY:  Denies alcohol, tobacco, or illicit drug use      REVIEW OF SYSTEMS:  GENERAL: No fever, weight changes, fatigue  EYES: No eye pain or discharge  EAR/NOSE/MOUTH/THROAT: No sinus or throat pain; No difficulty hearing  NECK: No pain or stiffness  RESPIRATORY: No cough, wheezing, chills, or hemoptysis  CARDIOVASCULAR: No chest pain, palpitations, shortness of breath, or dyspnea on exertion  GASTROINTESTINAL: No abdominal pain, nausea, vomiting, hematemesis, diarrhea, or constipation  GENITOURINARY: No dysuria, frequency, hematuria, or incontinence  SKIN: No rashes or lesions  ENDOCRINE: No heat or cold intolerance  HEMATOLOGIC: No easy bruising or bleeding  PSYCHIATRIC: No depression, anxiety, or mood swings  MUSCULOSKELETAL: No joint pain or swelling  NEUROLOGICAL: As per HPI        OBJECTIVE:    Vital Signs Last 24 Hrs  T(C): 36.8 (18 Jul 2022 19:42), Max: 36.8 (18 Jul 2022 19:42)  T(F): 98.2 (18 Jul 2022 19:42), Max: 98.2 (18 Jul 2022 19:42)  HR: 83 (18 Jul 2022 19:42) (68 - 83)  BP: 131/85 (18 Jul 2022 19:42) (94/44 - 137/85)  RR: 18 (18 Jul 2022 19:42) (18 - 19)  SpO2: 100% (18 Jul 2022 19:42) (99% - 100%)  Parameters below as of 18 Jul 2022 19:42  Patient On (Oxygen Delivery Method): room air      General Examination:  General: No acute distress  HEENT: Atraumatic, Normocephalic  Respiratory: CTA B/l.  No crackles, rhonchi, or wheezes.  Cardiovascular: RRR.  Normal S1 & S2.  Normal b/l radial and pedal pulses.    Neurological Examination:  General / Mental Status: AAO x 3.  No aphasia or dysarthria.  Naming and repetition intact.  Cranial Nerves: VFF x 4.  PERRL.  EOMI x 2, No nystagmus or diplopia.  B/l V1-V3 equal and intact to light touch and pinprick.  Symmetric facial movement and palate elevation.  B/l hearing equal to finger rub.  Negative [place]-Hallpike maneuver b/l.  5/5 strength with b/l sternocleidomastoid & trapezius.  Midline tongue protrusion, with no atrophy or fasciculations.  Motor: Normal bulk & tone in all four extremities.  5/5 strength throughout all four extremities.  No downward drift, rigidity, spasticity, or tremors in any of the four extremities.  Sensory: Intact to light touch and pinprick in all four extremities.  Negative Romberg.  Reflex: 2+ and symmetric at b/l biceps, triceps, brachioradialis, patellae, and ankles.  Downgoing toes b/l.  Coordination: No dysmetria with b/l finger-to-nose and heel raise tests.  Symmetric rapid alternating movements b/l.  Gait: Normal, narrow-based gait.  No difficulty with tiptoe and heel gaits.  Lists to left with tandem gait.          LABORATORY VALUES:                        13.2   4.79  )-----------( 300      ( 18 Jul 2022 10:04 )             38.8       07-18    131<L>  |  98  |  10  ----------------------------<  57<L>  3.9   |  26  |  0.75    Ca    9.4      18 Jul 2022 10:04  Phos  3.6     07-18  Mg     2.3     07-18    TPro  6.8  /  Alb  3.6  /  TBili  0.3  /  DBili  x   /  AST  19  /  ALT  23  /  AlkPhos  125<H>  07-17              NEUROIMAGING:      CT Head (7/17/22):  - No acute intracranial abnormality          Please contact the Neurology consult service with any neurological questions.    Kalia Esparza MD   of Neurology  [facility] at [facility] NEUROLOGY CONSULT NOTE    NAME:  MARBELLA CALDWELL      ASSESSMENT:  62 RHF with with trigeminal neuralgia presenting following an episode of lightheadedness followed by loss of consciousness for an uncertain duration, raising concern for syncopal event vs. new-onset seizure; also with tandem gait difficulty raising concern for subacute posterior circulation ischemic stroke      RECOMMENDATIONS:    - Routine EEG approved to evaluate for seizure tendency    - MRI Brain w/wo Gadolinium (Epilepsy protocol) to evaluate for intracranial abnormalities    - Continue cardiovascular workup: Telemetry monitoring, Echocardiogram, Cardiology follow-up    - Monitor orthostatic BP & HR with each vital signs check    - Plentiful fluid hydration (2 liters, or 8 glasses, of water daily) encouraged    - Patient counseled to maintain caution with rapid changes in position    - PT/OT to help with recovery of gait and balance    - DVT ppx: SCDs, Enoxaparin          *******************************      CHIEF COMPLAINT:  Patient is a 62y old  Female who presents with a chief complaint of syncope (18 Jul 2022 15:23)      HPI:  This is a 62 year old female with atypical trigeminal neuralgia coming in for syncope. Pt states that earlier today she went to the bathroom and was straining on the toilet and started to get lightheaded. She states she felt lightheaded and tried to sit on the bed and passed out. Her  who was at home states he heard a bang while he was in the kitchen. Went to check in on her and found her on the ground. Pt does not recall the event and woke up on the bed. She denies any headaches, visual disturbances, N/V/D,  falls, chest pain, palpitations, lower extremity swelling, fevers, skin rash, recent travel, or sick contacts. (17 Jul 2022 13:24)      NEURO HPI:  62 RHF with trigeminal neuralgia, on Oxcarbazepine 600mg PO BID, who presented after having an episode of lightheadedness followed by loss of consciousness for an uncertain period of time.      PAST MEDICAL & SURGICAL HISTORY:  Trigeminal neuralgia  No significant past surgical history      MEDICATIONS:  acetaminophen     Tablet .. 650 milliGRAM(s) Oral every 6 hours PRN  aluminum hydroxide/magnesium hydroxide/simethicone Suspension 30 milliLiter(s) Oral every 4 hours PRN  amitriptyline 50 milliGRAM(s) Oral daily  clonazePAM  Tablet 0.5 milliGRAM(s) Oral daily  enoxaparin Injectable 40 milliGRAM(s) SubCutaneous every 24 hours  lactated ringers. 1000 milliLiter(s) IV Continuous <Continuous>  melatonin 3 milliGRAM(s) Oral at bedtime PRN  ondansetron Injectable 4 milliGRAM(s) IV Push every 8 hours PRN  OXcarbazepine 600 milliGRAM(s) Oral two times a day  polyethylene glycol 3350 17 Gram(s) Oral daily  senna 2 Tablet(s) Oral at bedtime  sertraline 100 milliGRAM(s) Oral daily  sodium chloride 0.9%. 1000 milliLiter(s) IV Continuous <Continuous>      ALLERGIES:  No Known Allergies      FAMILY HISTORY:  No pertinent family history in first degree relatives      SOCIAL HISTORY:  Denies alcohol, tobacco, or illicit drug use      REVIEW OF SYSTEMS:  GENERAL: No fever, weight changes, fatigue  EYES: No eye pain or discharge  EAR/NOSE/MOUTH/THROAT: No sinus or throat pain; No difficulty hearing  NECK: No pain or stiffness  RESPIRATORY: No cough, wheezing, chills, or hemoptysis  CARDIOVASCULAR: No chest pain, palpitations, shortness of breath, or dyspnea on exertion  GASTROINTESTINAL: No abdominal pain, nausea, vomiting, hematemesis, diarrhea, or constipation  GENITOURINARY: No dysuria, frequency, hematuria, or incontinence  SKIN: No rashes or lesions  ENDOCRINE: No heat or cold intolerance  HEMATOLOGIC: No easy bruising or bleeding  PSYCHIATRIC: No depression, anxiety, or mood swings  MUSCULOSKELETAL: No joint pain or swelling  NEUROLOGICAL: As per HPI        OBJECTIVE:    Vital Signs Last 24 Hrs  T(C): 36.8 (18 Jul 2022 19:42), Max: 36.8 (18 Jul 2022 19:42)  T(F): 98.2 (18 Jul 2022 19:42), Max: 98.2 (18 Jul 2022 19:42)  HR: 83 (18 Jul 2022 19:42) (68 - 83)  BP: 131/85 (18 Jul 2022 19:42) (94/44 - 137/85)  RR: 18 (18 Jul 2022 19:42) (18 - 19)  SpO2: 100% (18 Jul 2022 19:42) (99% - 100%)  Parameters below as of 18 Jul 2022 19:42  Patient On (Oxygen Delivery Method): room air      General Examination:  General: No acute distress  HEENT: Atraumatic, Normocephalic  Respiratory: CTA B/l.  No crackles, rhonchi, or wheezes.  Cardiovascular: RRR.  Normal S1 & S2.  Normal b/l radial and pedal pulses.    Neurological Examination:  General / Mental Status: AAO x 3.  No aphasia or dysarthria.  Naming and repetition intact.  Cranial Nerves: VFF x 4.  PERRL.  EOMI x 2, No nystagmus or diplopia.  B/l V1-V3 equal and intact to light touch and pinprick.  Symmetric facial movement and palate elevation.  B/l hearing equal to finger rub.  Negative [place]-Hallpike maneuver b/l.  5/5 strength with b/l sternocleidomastoid & trapezius.  Midline tongue protrusion, with no atrophy or fasciculations.  Motor: Normal bulk & tone in all four extremities.  5/5 strength throughout all four extremities.  No downward drift, rigidity, spasticity, or tremors in any of the four extremities.  Sensory: Intact to light touch and pinprick in all four extremities.  Negative Romberg.  Reflex: 2+ and symmetric at b/l biceps, triceps, brachioradialis, patellae, and ankles.  Downgoing toes b/l.  Coordination: No dysmetria with b/l finger-to-nose and heel raise tests.  Symmetric rapid alternating movements b/l.  Gait: Normal, narrow-based gait.  No difficulty with tiptoe and heel gaits.  Lists to left with tandem gait.          LABORATORY VALUES:                        13.2   4.79  )-----------( 300      ( 18 Jul 2022 10:04 )             38.8       07-18    131<L>  |  98  |  10  ----------------------------<  57<L>  3.9   |  26  |  0.75    Ca    9.4      18 Jul 2022 10:04  Phos  3.6     07-18  Mg     2.3     07-18    TPro  6.8  /  Alb  3.6  /  TBili  0.3  /  DBili  x   /  AST  19  /  ALT  23  /  AlkPhos  125<H>  07-17    A1C with Estimated Average Glucose (07.18.22 @ 00:13)   A1C with Estimated Average Glucose Result: 5.3%   Estimated Average Glucose: 105 mg/dL     Thyroid Stimulating Hormone, Serum (07.18.22 @ 06:38)   Thyroid Stimulating Hormone, Serum: 2.30 uU/mL           NEUROIMAGING:      CT Head (7/17/22):  - No acute intracranial abnormality          Please contact the Neurology consult service with any neurological questions.    Kalia Esparza MD   of Neurology  [facility]

## 2022-07-18 NOTE — CONSULT NOTE ADULT - TIME BILLING
I counseled the patient about her differential diagnoses, and the further testing indicated to help confirm her diagnosis.

## 2022-07-18 NOTE — CONSULT NOTE ADULT - ASSESSMENT
62 year old female with atypical trigeminal neuralgia,HTN  coming in for syncope,dizziness,weakness and hyponatremia.  1.Tele monitoring.  2.Orthostatic bp.  3.Hyponatremia-IVF,Renal eval.  4.Neurology eval.  5.Trigeminal neuralgia-OXcarbazepine.  6.GI and DVT prophylaxis.

## 2022-07-18 NOTE — PROGRESS NOTE ADULT - ASSESSMENT
This is a 62 year old female with atypical trigeminal neuralgia coming in for syncope This is a 62 year old female with atypical trigeminal neuralgia coming in for syncopal episode. This is a 62 year old female with atypical trigeminal neuralgia coming in for syncopal episode. Admitted for syncope w/u.

## 2022-07-18 NOTE — PROGRESS NOTE ADULT - SUBJECTIVE AND OBJECTIVE BOX
PGY-1 Progress Note discussed with attending    PAGER #: [[phone]] TILL 5:00 PM  PLEASE CONTACT ON CALL TEAM:  - On Call Team (Please refer to Mary) FROM 5:00 PM - 8:30PM  - Nightfloat Team FROM 8:30 -7:30 AM    CHIEF COMPLAINT & BRIEF HOSPITAL COURSE:    INTERVAL HPI/OVERNIGHT EVENTS:   MEDICATIONS  (STANDING):  amitriptyline 50 milliGRAM(s) Oral daily  clonazePAM  Tablet 0.5 milliGRAM(s) Oral daily  enoxaparin Injectable 40 milliGRAM(s) SubCutaneous every 24 hours  lactated ringers. 1000 milliLiter(s) (60 mL/Hr) IV Continuous <Continuous>  OXcarbazepine 600 milliGRAM(s) Oral two times a day  polyethylene glycol 3350 17 Gram(s) Oral daily  senna 2 Tablet(s) Oral at bedtime  sertraline 100 milliGRAM(s) Oral daily    MEDICATIONS  (PRN):  acetaminophen     Tablet .. 650 milliGRAM(s) Oral every 6 hours PRN Temp greater or equal to 38C (100.4F), Mild Pain (1 - 3)  aluminum hydroxide/magnesium hydroxide/simethicone Suspension 30 milliLiter(s) Oral every 4 hours PRN Dyspepsia  melatonin 3 milliGRAM(s) Oral at bedtime PRN Insomnia  ondansetron Injectable 4 milliGRAM(s) IV Push every 8 hours PRN Nausea and/or Vomiting      REVIEW OF SYSTEMS:  CONSTITUTIONAL: No fever, weight loss, or fatigue  RESPIRATORY: No cough, wheezing, chills or hemoptysis; No shortness of breath  CARDIOVASCULAR: No chest pain, palpitations, dizziness, or leg swelling  GASTROINTESTINAL: No abdominal pain. No nausea, vomiting, or hematemesis; No diarrhea or constipation. No melena or hematochezia.  GENITOURINARY: No dysuria or hematuria, urinary frequency  NEUROLOGICAL: No headaches, memory loss, loss of strength, numbness, or tremors  SKIN: No itching, burning, rashes, or lesions     Vital Signs Last 24 Hrs  T(C): 36.3 (18 Jul 2022 04:11), Max: 37.1 (17 Jul 2022 15:24)  T(F): 97.3 (18 Jul 2022 04:11), Max: 98.8 (17 Jul 2022 15:24)  HR: 74 (18 Jul 2022 04:11) (63 - 74)  BP: 111/56 (18 Jul 2022 04:11) (111/56 - 157/90)  BP(mean): --  RR: 19 (18 Jul 2022 04:11) (16 - 19)  SpO2: 100% (18 Jul 2022 04:11) (99% - 100%)    Parameters below as of 18 Jul 2022 04:11  Patient On (Oxygen Delivery Method): room air        PHYSICAL EXAMINATION:  GENERAL: NAD, well built  HEAD:  Atraumatic, Normocephalic  EYES:  conjunctiva and sclera clear  NECK: Supple, No JVD, Normal thyroid  CHEST/LUNG: Clear to auscultation. Clear to percussion bilaterally; No rales, rhonchi, wheezing, or rubs  HEART: Regular rate and rhythm; No murmurs, rubs, or gallops  ABDOMEN: Soft, Nontender, Nondistended; Bowel sounds present  NERVOUS SYSTEM:  Alert & Oriented X3,    EXTREMITIES:  2+ Peripheral Pulses, No clubbing, cyanosis, or edema  SKIN: warm dry                          12.7   4.70  )-----------( 274      ( 17 Jul 2022 10:41 )             36.6     07-17    126<L>  |  94<L>  |  13  ----------------------------<  100<H>  4.1   |  23  |  0.78    Ca    9.5      17 Jul 2022 10:41    TPro  6.8  /  Alb  3.6  /  TBili  0.3  /  DBili  x   /  AST  19  /  ALT  23  /  AlkPhos  125<H>  07-17    LIVER FUNCTIONS - ( 17 Jul 2022 10:41 )  Alb: 3.6 g/dL / Pro: 6.8 g/dL / ALK PHOS: 125 U/L / ALT: 23 U/L DA / AST: 19 U/L / GGT: x                   CAPILLARY BLOOD GLUCOSE      RADIOLOGY & ADDITIONAL TESTS:                   PGY-1 Progress Note discussed with attending    CHIEF COMPLAINT & BRIEF HOSPITAL COURSE:    This is a 62 year old female with atypical trigeminal neuralgia coming in for syncopal episode.     INTERVAL HPI/OVERNIGHT EVENTS:     Pt. was alert and awake. No acute overnight events. Had a good night's rest. No recent trigeminal neuralgia episodes, and no recent feelings of lightheadedness, dizziness, LOC, chest pain, palpitations, shortness of breath, headache. Last bowel movement was yesterday before the syncopal episode.     MEDICATIONS  (STANDING):  amitriptyline 50 milliGRAM(s) Oral daily  clonazePAM  Tablet 0.5 milliGRAM(s) Oral daily  enoxaparin Injectable 40 milliGRAM(s) SubCutaneous every 24 hours  lactated ringers. 1000 milliLiter(s) (60 mL/Hr) IV Continuous <Continuous>  OXcarbazepine 600 milliGRAM(s) Oral two times a day  polyethylene glycol 3350 17 Gram(s) Oral daily  senna 2 Tablet(s) Oral at bedtime  sertraline 100 milliGRAM(s) Oral daily    MEDICATIONS  (PRN):  acetaminophen     Tablet .. 650 milliGRAM(s) Oral every 6 hours PRN Temp greater or equal to 38C (100.4F), Mild Pain (1 - 3)  aluminum hydroxide/magnesium hydroxide/simethicone Suspension 30 milliLiter(s) Oral every 4 hours PRN Dyspepsia  melatonin 3 milliGRAM(s) Oral at bedtime PRN Insomnia  ondansetron Injectable 4 milliGRAM(s) IV Push every 8 hours PRN Nausea and/or Vomiting      REVIEW OF SYSTEMS:  CONSTITUTIONAL: No fever, weight loss, or fatigue  RESPIRATORY: No cough, wheezing, chills or hemoptysis; No shortness of breath  CARDIOVASCULAR: No chest pain, palpitations, dizziness, or leg swelling  GASTROINTESTINAL: No abdominal pain. No nausea, vomiting, or hematemesis; No diarrhea or constipation. No melena or hematochezia.  GENITOURINARY: No dysuria or hematuria, urinary frequency  NEUROLOGICAL: No headaches, memory loss, loss of strength, numbness, or tremors  SKIN: No itching, burning, rashes, or lesions     Vital Signs Last 24 Hrs  T(C): 36.3 (18 Jul 2022 04:11), Max: 37.1 (17 Jul 2022 15:24)  T(F): 97.3 (18 Jul 2022 04:11), Max: 98.8 (17 Jul 2022 15:24)  HR: 74 (18 Jul 2022 04:11) (63 - 74)  BP: 111/56 (18 Jul 2022 04:11) (111/56 - 157/90)  BP(mean): --  RR: 19 (18 Jul 2022 04:11) (16 - 19)  SpO2: 100% (18 Jul 2022 04:11) (99% - 100%)    Parameters below as of 18 Jul 2022 04:11  Patient On (Oxygen Delivery Method): room air        PHYSICAL EXAMINATION:  GENERAL: NAD, well built  HEAD:  Atraumatic, Normocephalic  EYES:  conjunctiva and sclera clear  NECK: Supple, No JVD, Normal thyroid  CHEST/LUNG: Clear to auscultation. Clear to percussion bilaterally; No rales, rhonchi, wheezing, or rubs  HEART: Regular rate and rhythm; No murmurs, rubs, or gallops  ABDOMEN: Soft, Nontender, Nondistended; Bowel sounds present  NERVOUS SYSTEM:  Alert & Oriented X3,    EXTREMITIES:  2+ Peripheral Pulses, No clubbing, cyanosis, or edema  SKIN: warm dry                          12.7   4.70  )-----------( 274      ( 17 Jul 2022 10:41 )             36.6     07-17    126<L>  |  94<L>  |  13  ----------------------------<  100<H>  4.1   |  23  |  0.78    Ca    9.5      17 Jul 2022 10:41    TPro  6.8  /  Alb  3.6  /  TBili  0.3  /  DBili  x   /  AST  19  /  ALT  23  /  AlkPhos  125<H>  07-17    LIVER FUNCTIONS - ( 17 Jul 2022 10:41 )  Alb: 3.6 g/dL / Pro: 6.8 g/dL / ALK PHOS: 125 U/L / ALT: 23 U/L DA / AST: 19 U/L / GGT: x                   CAPILLARY BLOOD GLUCOSE      RADIOLOGY & ADDITIONAL TESTS:    TTE 7/18:   CONCLUSIONS:  1. Normal mitral valve.  2. Normal trileaflet aortic valve.  3. Aortic Root: 3.2 cm.  4. Normal left atrium.  LA volume index = 27 cc/m2.  5. Normal left ventricular internal dimensions and wall  thicknesses.  6. Normal Left Ventricular Systolic Function,  (EF = 55 to  60%)  7. Normal diastolic function.  8. Normal right atrium.  9. Normal right ventricular size and systolic function  (TAPSE  2.4cm).  10. Unable to estimate RVSP.  11. Normal tricuspid valve.  12. Normal pulmonic valve.  13. Small pericardial effusion,greatest diameter 1.0cm..               PGY-1 Progress Note discussed with attending    CHIEF COMPLAINT & BRIEF HOSPITAL COURSE:    This is a 62 year old female with atypical trigeminal neuralgia coming in for syncopal episode. Admitted for syncope w/u.     INTERVAL HPI/OVERNIGHT EVENTS:     Pt. had no acute events overnight, no recent complaints. No recent episodes of trigeminal neuralgia symptoms, lightheadedness, dizziness, headaches, SOB, chest pain. Last bowel movement was yesterday right before her syncopal episode.     MEDICATIONS  (STANDING):  amitriptyline 50 milliGRAM(s) Oral daily  clonazePAM  Tablet 0.5 milliGRAM(s) Oral daily  enoxaparin Injectable 40 milliGRAM(s) SubCutaneous every 24 hours  lactated ringers. 1000 milliLiter(s) (60 mL/Hr) IV Continuous <Continuous>  OXcarbazepine 600 milliGRAM(s) Oral two times a day  polyethylene glycol 3350 17 Gram(s) Oral daily  senna 2 Tablet(s) Oral at bedtime  sertraline 100 milliGRAM(s) Oral daily    MEDICATIONS  (PRN):  acetaminophen     Tablet .. 650 milliGRAM(s) Oral every 6 hours PRN Temp greater or equal to 38C (100.4F), Mild Pain (1 - 3)  aluminum hydroxide/magnesium hydroxide/simethicone Suspension 30 milliLiter(s) Oral every 4 hours PRN Dyspepsia  melatonin 3 milliGRAM(s) Oral at bedtime PRN Insomnia  ondansetron Injectable 4 milliGRAM(s) IV Push every 8 hours PRN Nausea and/or Vomiting      REVIEW OF SYSTEMS:  CONSTITUTIONAL: No fever, weight loss, or fatigue  RESPIRATORY: No cough, wheezing, chills or hemoptysis; No shortness of breath  CARDIOVASCULAR: No chest pain, palpitations, dizziness, or leg swelling  GASTROINTESTINAL: No abdominal pain. No nausea, vomiting, or hematemesis; No diarrhea or constipation. No melena or hematochezia.  GENITOURINARY: No dysuria or hematuria, urinary frequency  NEUROLOGICAL: No headaches, memory loss, loss of strength, numbness, or tremors  SKIN: No itching, burning, rashes, or lesions     Vital Signs Last 24 Hrs  T(C): 36.3 (18 Jul 2022 04:11), Max: 37.1 (17 Jul 2022 15:24)  T(F): 97.3 (18 Jul 2022 04:11), Max: 98.8 (17 Jul 2022 15:24)  HR: 74 (18 Jul 2022 04:11) (63 - 74)  BP: 111/56 (18 Jul 2022 04:11) (111/56 - 157/90)  BP(mean): --  RR: 19 (18 Jul 2022 04:11) (16 - 19)  SpO2: 100% (18 Jul 2022 04:11) (99% - 100%)    Parameters below as of 18 Jul 2022 04:11  Patient On (Oxygen Delivery Method): room air        PHYSICAL EXAMINATION:  GENERAL: NAD, well built  HEAD:  Atraumatic, Normocephalic  EYES:  conjunctiva and sclera clear  NECK: Supple, No JVD, Normal thyroid  CHEST/LUNG: Clear to auscultation. Clear to percussion bilaterally; No rales, rhonchi, wheezing, or rubs  HEART: Regular rate and rhythm; No murmurs, rubs, or gallops  ABDOMEN: Soft, Nontender, Nondistended; Bowel sounds present  NERVOUS SYSTEM:  Alert & Oriented X3,    EXTREMITIES:  2+ Peripheral Pulses, No clubbing, cyanosis, or edema  SKIN: warm dry                          12.7   4.70  )-----------( 274      ( 17 Jul 2022 10:41 )             36.6     07-17    126<L>  |  94<L>  |  13  ----------------------------<  100<H>  4.1   |  23  |  0.78    Ca    9.5      17 Jul 2022 10:41    TPro  6.8  /  Alb  3.6  /  TBili  0.3  /  DBili  x   /  AST  19  /  ALT  23  /  AlkPhos  125<H>  07-17    LIVER FUNCTIONS - ( 17 Jul 2022 10:41 )  Alb: 3.6 g/dL / Pro: 6.8 g/dL / ALK PHOS: 125 U/L / ALT: 23 U/L DA / AST: 19 U/L / GGT: x                   CAPILLARY BLOOD GLUCOSE      RADIOLOGY & ADDITIONAL TESTS:                   PGY-1 Progress Note discussed with attending    CHIEF COMPLAINT & BRIEF HOSPITAL COURSE:    This is a 62 year old female with atypical trigeminal neuralgia coming in for syncopal episode. Admitted for syncope w/u.     INTERVAL HPI/OVERNIGHT EVENTS:     Pt. had no acute events overnight, no recent complaints. No recent episodes of trigeminal neuralgia symptoms, lightheadedness, dizziness, headaches, SOB, chest pain. Last bowel movement was yesterday right before her syncopal episode.     MEDICATIONS  (STANDING):  amitriptyline 50 milliGRAM(s) Oral daily  clonazePAM  Tablet 0.5 milliGRAM(s) Oral daily  enoxaparin Injectable 40 milliGRAM(s) SubCutaneous every 24 hours  lactated ringers. 1000 milliLiter(s) (60 mL/Hr) IV Continuous <Continuous>  OXcarbazepine 600 milliGRAM(s) Oral two times a day  polyethylene glycol 3350 17 Gram(s) Oral daily  senna 2 Tablet(s) Oral at bedtime  sertraline 100 milliGRAM(s) Oral daily    MEDICATIONS  (PRN):  acetaminophen     Tablet .. 650 milliGRAM(s) Oral every 6 hours PRN Temp greater or equal to 38C (100.4F), Mild Pain (1 - 3)  aluminum hydroxide/magnesium hydroxide/simethicone Suspension 30 milliLiter(s) Oral every 4 hours PRN Dyspepsia  melatonin 3 milliGRAM(s) Oral at bedtime PRN Insomnia  ondansetron Injectable 4 milliGRAM(s) IV Push every 8 hours PRN Nausea and/or Vomiting      REVIEW OF SYSTEMS:  CONSTITUTIONAL: No fever, weight loss, or fatigue  RESPIRATORY: No cough, wheezing, chills or hemoptysis; No shortness of breath  CARDIOVASCULAR: No chest pain, palpitations, dizziness, or leg swelling  GASTROINTESTINAL: No abdominal pain. No nausea, vomiting, or hematemesis; No diarrhea or constipation. No melena or hematochezia.  GENITOURINARY: No dysuria or hematuria, urinary frequency  NEUROLOGICAL: No headaches, memory loss, loss of strength, numbness, or tremors  SKIN: No itching, burning, rashes, or lesions     Vital Signs Last 24 Hrs  T(C): 36.3 (18 Jul 2022 04:11), Max: 37.1 (17 Jul 2022 15:24)  T(F): 97.3 (18 Jul 2022 04:11), Max: 98.8 (17 Jul 2022 15:24)  HR: 74 (18 Jul 2022 04:11) (63 - 74)  BP: 111/56 (18 Jul 2022 04:11) (111/56 - 157/90)  BP(mean): --  RR: 19 (18 Jul 2022 04:11) (16 - 19)  SpO2: 100% (18 Jul 2022 04:11) (99% - 100%)    Parameters below as of 18 Jul 2022 04:11  Patient On (Oxygen Delivery Method): room air        PHYSICAL EXAMINATION:  GENERAL: NAD, well built  HEAD:  Atraumatic, Normocephalic  EYES:  conjunctiva and sclera clear  NECK: Supple, No JVD, Normal thyroid  CHEST/LUNG: Clear to auscultation. Clear to percussion bilaterally; No rales, rhonchi, wheezing, or rubs  HEART: Regular rate and rhythm; No murmurs, rubs, or gallops  ABDOMEN: Soft, Nontender, Nondistended; Bowel sounds present  NERVOUS SYSTEM:  Alert & Oriented X3,    EXTREMITIES:  2+ Peripheral Pulses, No clubbing, cyanosis, or edema  SKIN: warm dry                          12.7   4.70  )-----------( 274      ( 17 Jul 2022 10:41 )             36.6     07-17    126<L>  |  94<L>  |  13  ----------------------------<  100<H>  4.1   |  23  |  0.78    Ca    9.5      17 Jul 2022 10:41    TPro  6.8  /  Alb  3.6  /  TBili  0.3  /  DBili  x   /  AST  19  /  ALT  23  /  AlkPhos  125<H>  07-17    LIVER FUNCTIONS - ( 17 Jul 2022 10:41 )  Alb: 3.6 g/dL / Pro: 6.8 g/dL / ALK PHOS: 125 U/L / ALT: 23 U/L DA / AST: 19 U/L / GGT: x           RADIOLOGY & ADDITIONAL TESTS:    TTE 7/17:    CONCLUSIONS:  1. Normal mitral valve.  2. Normal trileaflet aortic valve.  3. Aortic Root: 3.2 cm.  4. Normal left atrium.  LA volume index = 27 cc/m2.  5. Normal left ventricular internal dimensions and wall  thicknesses.  6. Normal Left Ventricular Systolic Function,  (EF = 55 to  60%)  7. Normal diastolic function.  8. Normal right atrium.  9. Normal right ventricular size and systolic function  (TAPSE  2.4cm).  10. Unable to estimate RVSP.  11. Normal tricuspid valve.  12. Normal pulmonic valve.  13. Small pericardial effusion,greatest diameter 1.0cm..

## 2022-07-18 NOTE — PHYSICAL THERAPY INITIAL EVALUATION ADULT - DIAGNOSIS, PT EVAL
(ICF Model) Pt. present w/impairments in Body Structures/Function, incl: Strength, Balance, leading to deficits in performing the below noted Activities (Limitations).

## 2022-07-18 NOTE — PROGRESS NOTE ADULT - PROBLEM SELECTOR PLAN 2
p/w hyponatremia  do not correct by more than 8 meq in 24hr  Goal Na: 134  BMP q6h  c/w ivf for now  send serum osm, urine osm, urine na  - Nephro - Dr. Parisi consulted. p/w hyponatremia Na 126  do not correct by more than 8 meq in 24hr  Goal Na: 134  - Na improved from 126 > 131  - follow AM BMP  - c/w IVF NS 60 cc/hr for now  - serum osm 7/17 - 271, low, hypotonic hyponatremia, possibly SIADH  - renin and aldosterone ordered, pending collection   - f/u urine studies: urine osm, urine na  - Nephro - carbamazepine can also cause SIADH  - Monitor serum sodium. p/w hyponatremia Na 126  do not correct by more than 8 meq in 24hr  Goal Na: 134  - Na improved from 126 > 131  - follow AM BMP  - c/w IVF NS 60 cc/hr for now  - serum osm 7/17 - 271, low, hypotonic hyponatremia, possibly SIADH  - renin and aldosterone ordered, pending collection   - f/u urine studies: urine osm, urine na, urine Cr  - Nephro - carbamazepine can also cause SIADH  - Monitor serum sodium.

## 2022-07-18 NOTE — CONSULT NOTE ADULT - ASSESSMENT
Patient is a 61yo Female with atypical trigeminal neuralgia on carbamazepine admitted with syncope and found to have serum Na 126. Nephrology consulted for hyponatremia.       1. Hyponatremia- serum Na improved s/p LR IVF. Recc NS @ 60ml/hr. History consistent with polydipisa. Recc to restrict free water <1L/day. Serum Osm 271 and TSH wnl. Check urine sodium &, urine osmolality and serum osmolality. carbamazepine can also cause SIADH. Will f/u urine studies.  Monitor serum sodium.    2. Syncope- w/u as per primary team    3. Trigeminal neuralgia- now on oxcarbazepine/ zoloft and amitripytiline, which an also cause SIADH; will monitor for now.       [facility] NEPHROLOGY  Dwaine Gold M.D.  Christiano Maza D.O.  Sanam Parisi M.D.  Ana Burgess, MSN, ANP-C  [phone]    [address] [place] [address]

## 2022-07-18 NOTE — CONSULT NOTE ADULT - SUBJECTIVE AND OBJECTIVE BOX
[facility] NEPHROLOGY- CONSULTATION NOTE    Patient is a 61yo Female with atypical trigeminal neuralgia on carbamazepine admitted with syncope and found to have serum Na 126. Nephrology consulted for hyponatremia.     Pt thinks her sodium has been low previously. Pt denies any diuretic or NSAID use. Pt take Miralax. Pt deneis any n/v/d  Pt states she make at brews tea- 3 pots through out the day and drinks at least 6- 8 cups of tea per day. She also states she drink at least 6 cups of water today. Pt states she was started on carbamazepine in Feb and the dose of increased about 2 months ago.     Pt c/o mild SOB with chronic cough. Denies any chest pain, n/v/d, urinary complaints or LE edema   +fatigue and +epigastric burning pain    PAST MEDICAL & SURGICAL HISTORY:  Trigeminal neuralgia      No significant past surgical history        No Known Allergies    Home Medications Reviewed  Hospital Medications:   MEDICATIONS  (STANDING):  amitriptyline 50 milliGRAM(s) Oral daily  clonazePAM  Tablet 0.5 milliGRAM(s) Oral daily  enoxaparin Injectable 40 milliGRAM(s) SubCutaneous every 24 hours  lactated ringers. 1000 milliLiter(s) (60 mL/Hr) IV Continuous <Continuous>  OXcarbazepine 600 milliGRAM(s) Oral two times a day  polyethylene glycol 3350 17 Gram(s) Oral daily  senna 2 Tablet(s) Oral at bedtime  sertraline 100 milliGRAM(s) Oral daily    SOCIAL HISTORY: +ETOh mild   Denies , Smoking, or drug use  FAMILY HISTORY:  No pertinent family history in first degree relatives        REVIEW OF SYSTEMS:  Gen: no changes in weight  HEENT: no rhinorrhea  Neck: no sore throat  Cards: no chest pain  Resp: +dyspnea  GI: no nausea or vomiting or diarrhea +epigastric pain  : no dysuria or hematuria  Vascular: no LE edema  Derm: no rashes  Neuro: no numbness/tingling  All other review of systems is negative unless indicated above.    VITALS:  T(F): 97.7 (07-18-22 @ 11:13), Max: 98.8 (07-17-22 @ 15:24)  HR: 82 (07-18-22 @ 15:07)  BP: 120/80 (07-18-22 @ 15:07)  RR: 18 (07-18-22 @ 11:13)  SpO2: 99% (07-18-22 @ 15:07)  Wt(kg): --    07-17 @ 07:01  -  07-18 @ 07:00  --------------------------------------------------------  IN: 200 mL / OUT: 0 mL / NET: 200 mL        PHYSICAL EXAM:  Gen: NAD, calm  HEENT: MMM  Neck: no JVD  Cards: RRR, +S1/S2, no M/G/R  Resp: CTA B/L  GI: soft, NT/ND, NABS  : no CVA tenderness  Extremities: no LE edema B/L  Derm: no rashes  Neuro: non-focal    LABS:  07-18  131 <--, 126 <--  131<L>  |  98  |  10  ----------------------------<  57<L>  3.9   |  26  |  0.75    Ca    9.4      18 Jul 2022 10:04  Phos  3.6     07-18  Mg     2.3     07-18    TPro  6.8  /  Alb  3.6  /  TBili  0.3  /  DBili      /  AST  19  /  ALT  23  /  AlkPhos  125<H>  07-17    Creatinine Trend: 0.75 <--, 0.78 <--                        13.2   4.79  )-----------( 300      ( 18 Jul 2022 10:04 )             38.8     Urine Studies:      RADIOLOGY & ADDITIONAL STUDIES:

## 2022-07-19 LAB
ALBUMIN SERPL ELPH-MCNC: 3.3 G/DL — LOW (ref 3.5–5)
ALDOST SERPL-MCNC: 6.6 NG/DL — SIGNIFICANT CHANGE UP
ALP SERPL-CCNC: 127 U/L — HIGH (ref 40–120)
ALT FLD-CCNC: 21 U/L DA — SIGNIFICANT CHANGE UP (ref 10–60)
ANION GAP SERPL CALC-SCNC: 8 MMOL/L — SIGNIFICANT CHANGE UP (ref 5–17)
ANION GAP SERPL CALC-SCNC: 8 MMOL/L — SIGNIFICANT CHANGE UP (ref 5–17)
AST SERPL-CCNC: 16 U/L — SIGNIFICANT CHANGE UP (ref 10–40)
BILIRUB SERPL-MCNC: 0.4 MG/DL — SIGNIFICANT CHANGE UP (ref 0.2–1.2)
BUN SERPL-MCNC: 10 MG/DL — SIGNIFICANT CHANGE UP (ref 7–18)
BUN SERPL-MCNC: 10 MG/DL — SIGNIFICANT CHANGE UP (ref 7–18)
CALCIUM SERPL-MCNC: 9.2 MG/DL — SIGNIFICANT CHANGE UP (ref 8.4–10.5)
CALCIUM SERPL-MCNC: 9.7 MG/DL — SIGNIFICANT CHANGE UP (ref 8.4–10.5)
CHLORIDE SERPL-SCNC: 94 MMOL/L — LOW (ref 96–108)
CHLORIDE SERPL-SCNC: 97 MMOL/L — SIGNIFICANT CHANGE UP (ref 96–108)
CO2 SERPL-SCNC: 24 MMOL/L — SIGNIFICANT CHANGE UP (ref 22–31)
CO2 SERPL-SCNC: 27 MMOL/L — SIGNIFICANT CHANGE UP (ref 22–31)
CREAT SERPL-MCNC: 0.61 MG/DL — SIGNIFICANT CHANGE UP (ref 0.5–1.3)
CREAT SERPL-MCNC: 0.75 MG/DL — SIGNIFICANT CHANGE UP (ref 0.5–1.3)
EGFR: 102 ML/MIN/1.73M2 — SIGNIFICANT CHANGE UP
EGFR: 91 ML/MIN/1.73M2 — SIGNIFICANT CHANGE UP
GLUCOSE SERPL-MCNC: 114 MG/DL — HIGH (ref 70–99)
GLUCOSE SERPL-MCNC: 84 MG/DL — SIGNIFICANT CHANGE UP (ref 70–99)
HCT VFR BLD CALC: 34.8 % — SIGNIFICANT CHANGE UP (ref 34.5–45)
HGB BLD-MCNC: 11.6 G/DL — SIGNIFICANT CHANGE UP (ref 11.5–15.5)
MAGNESIUM SERPL-MCNC: 2.2 MG/DL — SIGNIFICANT CHANGE UP (ref 1.6–2.6)
MCHC RBC-ENTMCNC: 28.4 PG — SIGNIFICANT CHANGE UP (ref 27–34)
MCHC RBC-ENTMCNC: 33.3 GM/DL — SIGNIFICANT CHANGE UP (ref 32–36)
MCV RBC AUTO: 85.1 FL — SIGNIFICANT CHANGE UP (ref 80–100)
NRBC # BLD: 0 /100 WBCS — SIGNIFICANT CHANGE UP (ref 0–0)
OSMOLALITY UR: 534 MOS/KG — SIGNIFICANT CHANGE UP (ref 50–1200)
PHOSPHATE SERPL-MCNC: 3.8 MG/DL — SIGNIFICANT CHANGE UP (ref 2.5–4.5)
PLATELET # BLD AUTO: 267 K/UL — SIGNIFICANT CHANGE UP (ref 150–400)
POTASSIUM SERPL-MCNC: 4.3 MMOL/L — SIGNIFICANT CHANGE UP (ref 3.5–5.3)
POTASSIUM SERPL-MCNC: 4.6 MMOL/L — SIGNIFICANT CHANGE UP (ref 3.5–5.3)
POTASSIUM SERPL-SCNC: 4.3 MMOL/L — SIGNIFICANT CHANGE UP (ref 3.5–5.3)
POTASSIUM SERPL-SCNC: 4.6 MMOL/L — SIGNIFICANT CHANGE UP (ref 3.5–5.3)
PROT SERPL-MCNC: 6.5 G/DL — SIGNIFICANT CHANGE UP (ref 6–8.3)
RBC # BLD: 4.09 M/UL — SIGNIFICANT CHANGE UP (ref 3.8–5.2)
RBC # FLD: 14 % — SIGNIFICANT CHANGE UP (ref 10.3–14.5)
RENIN DIRECT, PLASMA: 2.5 PG/ML — SIGNIFICANT CHANGE UP
SODIUM SERPL-SCNC: 129 MMOL/L — LOW (ref 135–145)
SODIUM SERPL-SCNC: 129 MMOL/L — LOW (ref 135–145)
SODIUM UR-SCNC: 95 MMOL/L — SIGNIFICANT CHANGE UP
WBC # BLD: 4.01 K/UL — SIGNIFICANT CHANGE UP (ref 3.8–10.5)
WBC # FLD AUTO: 4.01 K/UL — SIGNIFICANT CHANGE UP (ref 3.8–10.5)

## 2022-07-19 PROCEDURE — 70553 MRI BRAIN STEM W/O & W/DYE: CPT | Mod: 26

## 2022-07-19 PROCEDURE — 99233 SBSQ HOSP IP/OBS HIGH 50: CPT

## 2022-07-19 PROCEDURE — 95819 EEG AWAKE AND ASLEEP: CPT | Mod: 26

## 2022-07-19 RX ORDER — CLONAZEPAM 1 MG
0.5 TABLET ORAL AT BEDTIME
Refills: 0 | Status: DISCONTINUED | OUTPATIENT
Start: 2022-07-19 | End: 2022-07-21

## 2022-07-19 RX ORDER — SODIUM CHLORIDE 9 MG/ML
1000 INJECTION INTRAMUSCULAR; INTRAVENOUS; SUBCUTANEOUS
Refills: 0 | Status: DISCONTINUED | OUTPATIENT
Start: 2022-07-19 | End: 2022-07-19

## 2022-07-19 RX ORDER — PANTOPRAZOLE SODIUM 20 MG/1
40 TABLET, DELAYED RELEASE ORAL
Refills: 0 | Status: DISCONTINUED | OUTPATIENT
Start: 2022-07-19 | End: 2022-07-21

## 2022-07-19 RX ORDER — SODIUM CHLORIDE 9 MG/ML
1 INJECTION INTRAMUSCULAR; INTRAVENOUS; SUBCUTANEOUS
Refills: 0 | Status: DISCONTINUED | OUTPATIENT
Start: 2022-07-19 | End: 2022-07-19

## 2022-07-19 RX ORDER — SODIUM CHLORIDE 9 MG/ML
1 INJECTION INTRAMUSCULAR; INTRAVENOUS; SUBCUTANEOUS THREE TIMES A DAY
Refills: 0 | Status: DISCONTINUED | OUTPATIENT
Start: 2022-07-19 | End: 2022-07-20

## 2022-07-19 RX ADMIN — OXCARBAZEPINE 600 MILLIGRAM(S): 300 TABLET, FILM COATED ORAL at 18:58

## 2022-07-19 RX ADMIN — POLYETHYLENE GLYCOL 3350 17 GRAM(S): 17 POWDER, FOR SOLUTION ORAL at 13:32

## 2022-07-19 RX ADMIN — SENNA PLUS 2 TABLET(S): 8.6 TABLET ORAL at 21:25

## 2022-07-19 RX ADMIN — ENOXAPARIN SODIUM 40 MILLIGRAM(S): 100 INJECTION SUBCUTANEOUS at 05:46

## 2022-07-19 RX ADMIN — SODIUM CHLORIDE 1 GRAM(S): 9 INJECTION INTRAMUSCULAR; INTRAVENOUS; SUBCUTANEOUS at 18:58

## 2022-07-19 RX ADMIN — Medication 200 MILLIGRAM(S): at 19:33

## 2022-07-19 RX ADMIN — Medication 50 MILLIGRAM(S): at 13:47

## 2022-07-19 RX ADMIN — Medication 0.5 MILLIGRAM(S): at 23:10

## 2022-07-19 RX ADMIN — SERTRALINE 100 MILLIGRAM(S): 25 TABLET, FILM COATED ORAL at 13:47

## 2022-07-19 RX ADMIN — SODIUM CHLORIDE 60 MILLILITER(S): 9 INJECTION INTRAMUSCULAR; INTRAVENOUS; SUBCUTANEOUS at 13:24

## 2022-07-19 RX ADMIN — OXCARBAZEPINE 600 MILLIGRAM(S): 300 TABLET, FILM COATED ORAL at 05:46

## 2022-07-19 NOTE — PROGRESS NOTE ADULT - SUBJECTIVE AND OBJECTIVE BOX
CHIEF COMPLAINT:Patient is a 62y old  Female who presents with a chief complaint of Syncope.Pt appears comfortable.    	  REVIEW OF SYSTEMS:  CONSTITUTIONAL: No fever, weight loss, or fatigue  EYES: No eye pain, visual disturbances, or discharge  ENT:  No difficulty hearing, tinnitus, vertigo; No sinus or throat pain  NECK: No pain or stiffness  RESPIRATORY: No cough, wheezing, chills or hemoptysis; No Shortness of Breath  CARDIOVASCULAR: No chest pain, palpitations, passing out, dizziness, or leg swelling  GASTROINTESTINAL: No abdominal or epigastric pain. No nausea, vomiting, or hematemesis; No diarrhea or constipation. No melena or hematochezia.  GENITOURINARY: No dysuria, frequency, hematuria, or incontinence  NEUROLOGICAL: No headaches, memory loss, loss of strength, numbness, or tremors  SKIN: No itching, burning, rashes, or lesions   LYMPH Nodes: No enlarged glands  ENDOCRINE: No heat or cold intolerance; No hair loss  MUSCULOSKELETAL: No joint pain or swelling; No muscle, back, or extremity pain  PSYCHIATRIC: No depression, anxiety, mood swings, or difficulty sleeping  HEME/LYMPH: No easy bruising, or bleeding gums  ALLERGY AND IMMUNOLOGIC: No hives or eczema	      PHYSICAL EXAM:  T(C): 37.6 (07-19-22 @ 08:18), Max: 37.6 (07-19-22 @ 08:18)  HR: 71 (07-19-22 @ 08:18) (68 - 83)  BP: 132/77 (07-19-22 @ 08:18) (94/44 - 137/85)  RR: 18 (07-19-22 @ 08:18) (17 - 18)  SpO2: 100% (07-19-22 @ 08:18) (99% - 100%)  Wt(kg): --  I&O's Summary      Appearance: Normal	  HEENT:   Normal oral mucosa, PERRL, EOMI	  Lymphatic: No lymphadenopathy  Cardiovascular: Normal S1 S2, No JVD, No murmurs, No edema  Respiratory: Lungs clear to auscultation	  Psychiatry: A & O x 3, Mood & affect appropriate  Gastrointestinal:  Soft, Non-tender, + BS	  Skin: No rashes, No ecchymoses, No cyanosis	  Neurologic: Non-focal  Extremities: Normal range of motion, No clubbing, cyanosis or edema  Vascular: Peripheral pulses palpable 2+ bilaterally    MEDICATIONS  (STANDING):  amitriptyline 50 milliGRAM(s) Oral daily  clonazePAM  Tablet 0.5 milliGRAM(s) Oral daily  enoxaparin Injectable 40 milliGRAM(s) SubCutaneous every 24 hours  OXcarbazepine 600 milliGRAM(s) Oral two times a day  polyethylene glycol 3350 17 Gram(s) Oral daily  senna 2 Tablet(s) Oral at bedtime  sertraline 100 milliGRAM(s) Oral daily  sodium chloride 1 Gram(s) Oral two times a day      TELEMETRY: nsr	    	  	  LABS:	 	    Troponin I, High Sensitivity Result: 5.6 ng/L (07-17 @ 10:41)                            11.6   4.01  )-----------( 267      ( 19 Jul 2022 05:23 )             34.8     07-19    129<L>  |  97  |  10  ----------------------------<  84  4.6   |  24  |  0.61    Ca    9.2      19 Jul 2022 05:23  Phos  3.8     07-19  Mg     2.2     07-19    TPro  6.5  /  Alb  3.3<L>  /  TBili  0.4  /  DBili  x   /  AST  16  /  ALT  21  /  AlkPhos  127<H>  07-19      TSH: Thyroid Stimulating Hormone, Serum: 2.30 uU/mL (07-18 @ 06:38)      	         no

## 2022-07-19 NOTE — PROGRESS NOTE ADULT - PROBLEM SELECTOR PLAN 1
p/w syncope lightheadedness, most likely vasovagal episode considering that the patient passed a bowel movement yesterday before the syncopal episode  orthostatic bp negative, recent BPs stable, no recent episodes of hypotension today   - TTE 7/17 WNL   - EKG 7/17- normal sinus rhythm   - pending UA  - CT head 7/17- no acute intracranial pathology   - PT 7/18 evaluation- recommended outpatient PT, 3-4x/week, for 4 weeks  - Cardio on board Dr. Castañeda

## 2022-07-19 NOTE — EEG REPORT - NS EEG TEXT BOX
[facility]  [facility]  Report of Routine EEG with Video    [facility]: [address], [place] [address], Phone [phone]  [place] Office: [address], [address], [place] [address] Phone [phone]    [facility]: [address] [place] [address], Phone [phone]  [place] Office: [address] [place] [address], Phone [phone]    Patient Name: MARBELLA CALDWELL    Age: 62 year  : 1960  Patient ID: -, MRN #: [id number], Location: [address]  Referring Physician: Dr. An  EEG #: [id number]    Study Date: 2022     Technical Information:					  On Instrument: -  Placement and Labeling of Electrodes:  The EEG was performed utilizing 20 channels referential EEG connections (coronal over temporal over parasagittal montage) using all standard 10-20 electrode placements with EKG.  Recording was at a sampling rate of 256 samples per second per channel.  Time synchronized digital video recording was done simultaneously with EEG recording.  A low light infrared camera was used for low light recording.  Hugo and seizure detection algorithms were utilized.    History:  Routine study..Performed bedside  Patient awake and alert  HV not performed since covid and photic performed  61 Y/O female presents with:  Syncope and collapse  R/O seizures    Pertinent Medication  Oxcarbazepine 600mg PO BID, Clonazepam 0.5mg PO Daily    Study Interpretation:  Findings: The background was continuous and reactive. During wakefulness, the posterior dominant rhythm consisted of symmetric, well-modulated 7.5 Hz activity, which is lower than normal for age, with amplitude to 30 uV, that attenuated to eye opening.     Background Slowing:  Diffuse theta frequency slowing.    Focal Slowing:   None were present.    Sleep Background:  Drowsiness was characterized by fragmentation, attenuation, and slowing of the background activity.    Stage II sleep transients were not recorded.    Other Non-Epileptiform Findings:  None were present.    Interictal Epileptiform Activity:   None were present.    Events:  No event or seizure recorded.    Activation Procedures:   Hyperventilation was not performed.    Photic stimulation was performed and did not elicit any abnormalities.      Artifacts:  Intermittent myogenic and movement artifacts were noted.    ECG:  The heart rate on single channel ECG was predominantly between 70 and 80 BPM.    EEG Summary / Classification:  Abnormal EEG in the awake and drowsy states.  - Moderate generalized background slowing.    EEG Impression / Clinical Correlate:    Abnormal EEG study.    Generalized background slowing is a nonspecific finding that can be seen in the setting of diffuse or multifocal structural abnormalities of the brain (including anoxic brain injury), toxic or metabolic encephalopathy, medication side effect, or infection.  No evidence of seizure tendency was identified.    ________________________________________    Kalia Esparza MD  Attending Physician, [facility] [place]

## 2022-07-19 NOTE — PROGRESS NOTE ADULT - ASSESSMENT
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 Nephrology consulted for hyponatremia.       1. Hyponatremia- urine studies consistent with SIADH. Recc to restrict free water <1L/day. TSH wnl and am cortisol wnl.   Recc NaCl 1g PO tid. Please keep off IVF. oxcarbazepine/ zoloft and amitripytiline,can also cause SIADH, will continue for now.   Monitor serum sodium.    2. Syncope- w/u as per primary team    3. Trigeminal neuralgia- now on oxcarbazepine/ zoloft and amitripytiline, which an also cause SIADH; will monitor for now.       [facility] NEPHROLOGY  Dwaine Gold M.D.  Christiano Maza D.O.  Sanam Parisi M.D.  Ana Burgess, MSN, ANP-C  [phone]    [address] [place] [address]

## 2022-07-19 NOTE — PROGRESS NOTE ADULT - SUBJECTIVE AND OBJECTIVE BOX
MARBELLA CALDWELL  MR# [id number]  62yFemale        Patient is a 62y old  Female who presents with a chief complaint of syncope (19 Jul 2022 09:48)      INTERVAL HPI/OVERNIGHT EVENTS:  Patient seen and examined at bedside. No notations of chest pain, palpitation, SOB, orthopnea, nausea, vomiting or abdominal pain.    ALLERGIES  No Known Allergies      MEDICATIONS  acetaminophen     Tablet .. 650 milliGRAM(s) Oral every 6 hours PRN Temp greater or equal to 38C (100.4F), Mild Pain (1 - 3)  aluminum hydroxide/magnesium hydroxide/simethicone Suspension 30 milliLiter(s) Oral every 4 hours PRN Dyspepsia  amitriptyline 50 milliGRAM(s) Oral daily  clonazePAM  Tablet 0.5 milliGRAM(s) Oral daily  enoxaparin Injectable 40 milliGRAM(s) SubCutaneous every 24 hours  melatonin 3 milliGRAM(s) Oral at bedtime PRN Insomnia  ondansetron Injectable 4 milliGRAM(s) IV Push every 8 hours PRN Nausea and/or Vomiting  OXcarbazepine 600 milliGRAM(s) Oral two times a day  polyethylene glycol 3350 17 Gram(s) Oral daily  senna 2 Tablet(s) Oral at bedtime  sertraline 100 milliGRAM(s) Oral daily  sodium chloride 1 Gram(s) Oral two times a day  sodium chloride 0.9%. 1000 milliLiter(s) IV Continuous <Continuous>              REVIEW OF SYSTEMS:  CONSTITUTIONAL: No fever, weight loss, or fatigue  EYES: No eye pain, visual disturbances, or discharge  ENT:  No difficulty hearing, tinnitus, vertigo; No sinus or throat pain  NECK: No pain or stiffness  RESPIRATORY: No cough, wheezing, chills or hemoptysis; No Shortness of Breath  CARDIOVASCULAR: No chest pain, palpitations, passing out, dizziness, or leg swelling  GASTROINTESTINAL: No abdominal or epigastric pain. No nausea, vomiting, or hematemesis; No diarrhea or constipation. No melena or hematochezia.  GENITOURINARY: No dysuria, frequency, hematuria, or incontinence  NEUROLOGICAL: No headaches, memory loss, loss of strength, numbness, or tremors  SKIN: No itching, burning, rashes, or lesions   LYMPH Nodes: No enlarged glands  ENDOCRINE: No heat or cold intolerance; No hair loss  MUSCULOSKELETAL: No joint pain or swelling; No muscle, back, or extremity pain  PSYCHIATRIC: No depression, anxiety, mood swings, or difficulty sleeping  HEME/LYMPH: No easy bruising, or bleeding gums  ALLERGY AND IMMUNOLOGIC: No hives or eczema	    [ ] All others negative	  [ ] Unable to obtain      T(C): 36.3 (07-19-22 @ 11:16), Max: 37.6 (07-19-22 @ 08:18)  T(F): 97.4 (07-19-22 @ 11:16), Max: 99.7 (07-19-22 @ 08:18)  HR: 83 (07-19-22 @ 11:16) (68 - 83)  BP: 100/69 (07-19-22 @ 11:16) (100/69 - 137/85)  RR: 18 (07-19-22 @ 11:16) (17 - 18)  SpO2: 100% (07-19-22 @ 11:16) (99% - 100%)  Wt(kg): --    I&O's Summary        PHYSICAL EXAM:  A X O x  HEAD:  Atraumatic, Normocephalic  EYES: EOMI, PERRLA, conjunctiva and sclera clear  NECK: Supple, No JVD, Normal thyroid  Resp: CTAB, No crackles, wheezing,   CVS: Regular rate and rhythm; No discernable murmurs, rubs, or gallops  ABD: Soft, Nontender, Nondistended; Bowel sounds present  EXTREMITIES:  2+ Peripheral Pulses, No edema  LYMPH: No dicernable lymphadenopathy noted  GENERAL: NAD, well-groomed, well-developed      LABS:                        11.6   4.01  )-----------( 267      ( 19 Jul 2022 05:23 )             34.8     07-19    129<L>  |  97  |  10  ----------------------------<  84  4.6   |  24  |  0.61    Ca    9.2      19 Jul 2022 05:23  Phos  3.8     07-19  Mg     2.2     07-19    TPro  6.5  /  Alb  3.3<L>  /  TBili  0.4  /  DBili  x   /  AST  16  /  ALT  21  /  AlkPhos  127<H>  07-19        CAPILLARY BLOOD GLUCOSE          Troponins:  ProBNP:  Lipid Profile:   HgA1c:  TSH:           RADIOLOGY & ADDITIONAL TESTS:    Imaging Personally Reviewed:  [ ] YES  [ ] NO      Consultant(s) Notes Reviewed:  [x ] YES  [ ] NO    Care Discussed with Consultants/Other Providers [ x] YES  [ ] NO          PAST MEDICAL & SURGICAL HISTORY:  Trigeminal neuralgia      No significant past surgical history            Syncope and collapse    No pertinent family history in first degree relatives    Handoff    MEWS Score    Trigeminal neuralgia    Syncope    Syncope    Trigeminal neuralgia    Prophylactic measure    Hyponatremia    No significant past surgical history    A) SYNCOPE    Hyponatremia    [email or web address]

## 2022-07-19 NOTE — PROGRESS NOTE ADULT - PROBLEM SELECTOR PLAN 1
p/w syncope lightheadedness, most likely vasovagal episode considering that the patient passed a bowel movement yesterday before the syncopal episode  orthostatic bp negative, recent BPs stable, no recent episodes of hypotension today   - TTE 7/17 WNL   - EKG 7/17- normal sinus rhythm   - CT head 7/17- no acute intracranial pathology   - MRI 7/19- No acute infarct, hemorrhage, or mass effect.  - PT 7/18 evaluation- recommended outpatient PT, 3-4x/week, for 4 weeks  - Cardio on board Dr. Castañeda  - Neuro on board Dr. Esparza

## 2022-07-19 NOTE — PROGRESS NOTE ADULT - ASSESSMENT
This is a 62 year old female with atypical trigeminal neuralgia coming in for syncopal episode. Admitted for syncope w/u.

## 2022-07-19 NOTE — PROGRESS NOTE ADULT - PROBLEM SELECTOR PLAN 2
p/w hyponatremia Na 126  do not correct by more than 8 meq in 24hr  Goal Na: 134  - Na improved from 126 > 131  - follow AM BMP  - c/w IVF NS 60 cc/hr for now  - serum osm 7/17 - 271, low, hypotonic hyponatremia, possibly SIADH  - renin and aldosterone ordered, pending collection   - f/u urine studies: urine osm, urine na, urine Cr  - Nephro - carbamazepine can also cause SIADH  - Monitor serum sodium.

## 2022-07-19 NOTE — PROGRESS NOTE ADULT - PROBLEM SELECTOR PLAN 3
Pt has a history of Trigeminal neuralgia.   - Will continue pt's home medication Amitriptyline 50mg and oxcarbazepine

## 2022-07-19 NOTE — CONSULT NOTE ADULT - SUBJECTIVE AND OBJECTIVE BOX
PGY-1 Progress Note discussed with attending    PAGER #: [[phone]] TILL 5:00 PM  PLEASE CONTACT ON CALL TEAM:  - On Call Team (Please refer to Mary) FROM 5:00 PM - 8:30PM  - Nightfloat Team FROM 8:30 -7:30 AM    CHIEF COMPLAINT & BRIEF HOSPITAL COURSE:  This is a 62 year old female with atypical trigeminal neuralgia coming in for syncope. Pt states that she went to the bathroom and was straining on the toilet and started to get lightheaded. She states she felt light headed and tried to sit on the bed and passed out.  Endocrinology is consulted for orthostatic hypotension , hyponatremia and weight loss of 25-30 pounds in a few months      REVIEW OF SYSTEMS:  CONSTITUTIONAL: No fever, weight loss, or fatigue  RESPIRATORY: No cough, wheezing, chills or hemoptysis; No shortness of breath  CARDIOVASCULAR: No chest pain, palpitations, dizziness, or leg swelling  GASTROINTESTINAL: No abdominal pain. No nausea, vomiting, or hematemesis; No diarrhea or constipation. No melena or hematochezia.  GENITOURINARY: No dysuria or hematuria, urinary frequency  NEUROLOGICAL: No headaches, memory loss, loss of strength, numbness, or tremors  SKIN: No itching, burning, rashes, or lesions     MEDICATIONS  (STANDING):  amitriptyline 50 milliGRAM(s) Oral daily  clonazePAM  Tablet 0.5 milliGRAM(s) Oral daily  enoxaparin Injectable 40 milliGRAM(s) SubCutaneous every 24 hours  OXcarbazepine 600 milliGRAM(s) Oral two times a day  polyethylene glycol 3350 17 Gram(s) Oral daily  senna 2 Tablet(s) Oral at bedtime  sertraline 100 milliGRAM(s) Oral daily  sodium chloride 1 Gram(s) Oral two times a day  sodium chloride 0.9%. 1000 milliLiter(s) (60 mL/Hr) IV Continuous <Continuous>    MEDICATIONS  (PRN):  acetaminophen     Tablet .. 650 milliGRAM(s) Oral every 6 hours PRN Temp greater or equal to 38C (100.4F), Mild Pain (1 - 3)  aluminum hydroxide/magnesium hydroxide/simethicone Suspension 30 milliLiter(s) Oral every 4 hours PRN Dyspepsia  melatonin 3 milliGRAM(s) Oral at bedtime PRN Insomnia  ondansetron Injectable 4 milliGRAM(s) IV Push every 8 hours PRN Nausea and/or Vomiting      Vital Signs Last 24 Hrs  T(C): 36.3 (19 Jul 2022 11:16), Max: 37.6 (19 Jul 2022 08:18)  T(F): 97.4 (19 Jul 2022 11:16), Max: 99.7 (19 Jul 2022 08:18)  HR: 83 (19 Jul 2022 11:16) (68 - 83)  BP: 100/69 (19 Jul 2022 11:16) (100/69 - 137/85)  BP(mean): 79 (19 Jul 2022 11:16) (79 - 95)  RR: 18 (19 Jul 2022 11:16) (17 - 18)  SpO2: 100% (19 Jul 2022 11:16) (99% - 100%)    Parameters below as of 19 Jul 2022 11:16  Patient On (Oxygen Delivery Method): room air        PHYSICAL EXAMINATION:  GENERAL: NAD, well built  HEAD:  Atraumatic, Normocephalic  EYES:  conjunctiva and sclera clear  NECK: Supple, No JVD, Normal thyroid  CHEST/LUNG: Clear to auscultation. Clear to percussion bilaterally; No rales, rhonchi, wheezing, or rubs  HEART: Regular rate and rhythm; No murmurs, rubs, or gallops  ABDOMEN: Soft, Nontender, Nondistended; Bowel sounds present  NERVOUS SYSTEM:  Alert & Oriented X3,    EXTREMITIES:  2+ Peripheral Pulses, No clubbing, cyanosis, or edema  SKIN: warm dry                          11.6   4.01  )-----------( 267      ( 19 Jul 2022 05:23 )             34.8     07-19    129<L>  |  97  |  10  ----------------------------<  84  4.6   |  24  |  0.61    Ca    9.2      19 Jul 2022 05:23  Phos  3.8     07-19  Mg     2.2     07-19    TPro  6.5  /  Alb  3.3<L>  /  TBili  0.4  /  DBili  x   /  AST  16  /  ALT  21  /  AlkPhos  127<H>  07-19    LIVER FUNCTIONS - ( 19 Jul 2022 05:23 )  Alb: 3.3 g/dL / Pro: 6.5 g/dL / ALK PHOS: 127 U/L / ALT: 21 U/L DA / AST: 16 U/L / GGT: x                       CAPILLARY BLOOD GLUCOSE  CAPILLARY BLOOD GLUCOSE        CAPILLARY BLOOD GLUCOSE          RADIOLOGY & ADDITIONAL TESTS:                     CHIEF COMPLAINT & BRIEF HOSPITAL COURSE:  This is a 62 year old female with atypical trigeminal neuralgia coming in for syncope. Pt states that she went to the bathroom and was straining on the toilet and started to get lightheaded. She states she felt light headed and tried to sit on the bed and passed out.  Endocrinology is consulted for orthostatic hypotension , hyponatremia and weight loss of 25-30 pounds in a few months      REVIEW OF SYSTEMS:  CONSTITUTIONAL: No fever, weight loss, or fatigue  RESPIRATORY: No cough, wheezing, chills or hemoptysis; No shortness of breath  CARDIOVASCULAR: No chest pain, palpitations, dizziness, or leg swelling  GASTROINTESTINAL: No abdominal pain. No nausea, vomiting, or hematemesis; No diarrhea or constipation. No melena or hematochezia.  GENITOURINARY: No dysuria or hematuria, urinary frequency  NEUROLOGICAL: No headaches, memory loss, loss of strength, numbness, or tremors  SKIN: No itching, burning, rashes, or lesions     MEDICATIONS  (STANDING):  amitriptyline 50 milliGRAM(s) Oral daily  clonazePAM  Tablet 0.5 milliGRAM(s) Oral daily  enoxaparin Injectable 40 milliGRAM(s) SubCutaneous every 24 hours  OXcarbazepine 600 milliGRAM(s) Oral two times a day  polyethylene glycol 3350 17 Gram(s) Oral daily  senna 2 Tablet(s) Oral at bedtime  sertraline 100 milliGRAM(s) Oral daily  sodium chloride 1 Gram(s) Oral two times a day  sodium chloride 0.9%. 1000 milliLiter(s) (60 mL/Hr) IV Continuous <Continuous>    MEDICATIONS  (PRN):  acetaminophen     Tablet .. 650 milliGRAM(s) Oral every 6 hours PRN Temp greater or equal to 38C (100.4F), Mild Pain (1 - 3)  aluminum hydroxide/magnesium hydroxide/simethicone Suspension 30 milliLiter(s) Oral every 4 hours PRN Dyspepsia  melatonin 3 milliGRAM(s) Oral at bedtime PRN Insomnia  ondansetron Injectable 4 milliGRAM(s) IV Push every 8 hours PRN Nausea and/or Vomiting      Vital Signs Last 24 Hrs  T(C): 36.3 (19 Jul 2022 11:16), Max: 37.6 (19 Jul 2022 08:18)  T(F): 97.4 (19 Jul 2022 11:16), Max: 99.7 (19 Jul 2022 08:18)  HR: 83 (19 Jul 2022 11:16) (68 - 83)  BP: 100/69 (19 Jul 2022 11:16) (100/69 - 137/85)  BP(mean): 79 (19 Jul 2022 11:16) (79 - 95)  RR: 18 (19 Jul 2022 11:16) (17 - 18)  SpO2: 100% (19 Jul 2022 11:16) (99% - 100%)    Parameters below as of 19 Jul 2022 11:16  Patient On (Oxygen Delivery Method): room air        PHYSICAL EXAMINATION:  GENERAL: NAD, well built  HEAD:  Atraumatic, Normocephalic  EYES:  conjunctiva and sclera clear  NECK: Supple, No JVD, Normal thyroid  CHEST/LUNG: Clear to auscultation. Clear to percussion bilaterally; No rales, rhonchi, wheezing, or rubs  HEART: Regular rate and rhythm; No murmurs, rubs, or gallops  ABDOMEN: Soft, Nontender, Nondistended; Bowel sounds present  NERVOUS SYSTEM:  Alert & Oriented X3,    EXTREMITIES:  2+ Peripheral Pulses, No clubbing, cyanosis, or edema  SKIN: warm dry                          11.6   4.01  )-----------( 267      ( 19 Jul 2022 05:23 )             34.8     07-19    129<L>  |  97  |  10  ----------------------------<  84  4.6   |  24  |  0.61    Ca    9.2      19 Jul 2022 05:23  Phos  3.8     07-19  Mg     2.2     07-19    TPro  6.5  /  Alb  3.3<L>  /  TBili  0.4  /  DBili  x   /  AST  16  /  ALT  21  /  AlkPhos  127<H>  07-19    LIVER FUNCTIONS - ( 19 Jul 2022 05:23 )  Alb: 3.3 g/dL / Pro: 6.5 g/dL / ALK PHOS: 127 U/L / ALT: 21 U/L DA / AST: 16 U/L / GGT: x                       CAPILLARY BLOOD GLUCOSE  CAPILLARY BLOOD GLUCOSE        CAPILLARY BLOOD GLUCOSE          RADIOLOGY & ADDITIONAL TESTS:

## 2022-07-19 NOTE — PROGRESS NOTE ADULT - ASSESSMENT
62 year old female with atypical trigeminal neuralgia,HTN  coming in for syncope,dizziness,weakness and hyponatremia.  1.Tele monitoring.  2.Orthostatic bp-.  3.Hyponatremia-Renal f/u,nacl tid.  4.Neurology eval noted,await EEG and MRI.  5.Trigeminal neuralgia-OXcarbazepine.  6.GI and DVT prophylaxis.

## 2022-07-19 NOTE — CONSULT NOTE ADULT - ASSESSMENT
This is a 62 year old female with atypical trigeminal neuralgia coming in for syncope. Pt states that earlier today she went to the bathroom and was straining on the toilet and started to get lightheaded. She states she felt light headed and tried to sit on the bed and passed out.  Endocrinology is consulted for orthostatic hypotension , hyponatremia and weight loss of 25-30 pounds in a few months   This is a 62 year old female with atypical trigeminal neuralgia coming in for syncope. Pt states that earlier today she went to the bathroom and was straining on the toilet and started to get lightheaded. She states she felt light headed and tried to sit on the bed and passed out.  Endocrinology is consulted for orthostatic hypotension , hyponatremia and weight loss of 25-30 pounds in a few months    # less likely to be adrenal insufficiency in the setting of normal cortisol level   - cortisol and TSH normal   - would recommend to check free T4 to r/o secondary hypothyroidism .    This is a 62 year old female with atypical trigeminal neuralgia coming in for syncope. Pt states that earlier today she went to the bathroom and was straining on the toilet and started to get lightheaded. She states she felt light headed and tried to sit on the bed and passed out.  Endocrinology is consulted for orthostatic hypotension , hyponatremia and weight loss of 25-30 pounds in a few months        # less likely to be adrenal insufficiency in the setting of normal cortisol level   - cortisol and TSH normal   - would recommend to check free T4 to r/o secondary hypothyroidism .     Hyponatremia- improving on nacl tabs  f/u lytes

## 2022-07-19 NOTE — PROGRESS NOTE ADULT - PROBLEM SELECTOR PLAN 2
p/w hyponatremia Na 126  do not correct by more than 8 meq in 24hr  Goal Na: 134  - Na 126 > 131 > 129  - urine Na = 55, low  - Nephro- d/c IVF, 1g sodium tablet given, less than 1L of water fluid restriction  - serum osm 7/17 = 271, low, hypotonic hyponatremia, possibly SIADH  - renin and aldosterone ordered, pending collection  - follow AM BMP  - Nephro on board    - Nephro - carbamazepine can also cause SIADH  - Monitor serum sodium.

## 2022-07-19 NOTE — PROGRESS NOTE ADULT - SUBJECTIVE AND OBJECTIVE BOX
PGY-1 Progress Note discussed with attending    CHIEF COMPLAINT & BRIEF HOSPITAL COURSE:    This is a 62 year old female with atypical trigeminal neuralgia coming in for syncopal episode. Admitted for syncope w/u.     INTERVAL HPI/OVERNIGHT EVENTS:     Pt. had no acute events overnight, no recent complaints. Pt. now endorses episodes of trigeminal neuralgia symptoms, but denies lightheadedness, dizziness, headaches, SOB, chest pain.     MEDICATIONS  (STANDING):  amitriptyline 50 milliGRAM(s) Oral daily  clonazePAM  Tablet 0.5 milliGRAM(s) Oral daily  enoxaparin Injectable 40 milliGRAM(s) SubCutaneous every 24 hours  lactated ringers. 1000 milliLiter(s) (60 mL/Hr) IV Continuous <Continuous>  OXcarbazepine 600 milliGRAM(s) Oral two times a day  polyethylene glycol 3350 17 Gram(s) Oral daily  senna 2 Tablet(s) Oral at bedtime  sertraline 100 milliGRAM(s) Oral daily    MEDICATIONS  (PRN):  acetaminophen     Tablet .. 650 milliGRAM(s) Oral every 6 hours PRN Temp greater or equal to 38C (100.4F), Mild Pain (1 - 3)  aluminum hydroxide/magnesium hydroxide/simethicone Suspension 30 milliLiter(s) Oral every 4 hours PRN Dyspepsia  melatonin 3 milliGRAM(s) Oral at bedtime PRN Insomnia  ondansetron Injectable 4 milliGRAM(s) IV Push every 8 hours PRN Nausea and/or Vomiting      REVIEW OF SYSTEMS:  CONSTITUTIONAL: No fever, weight loss, or fatigue  RESPIRATORY: No cough, wheezing, chills or hemoptysis; No shortness of breath  CARDIOVASCULAR: No chest pain, palpitations, dizziness, or leg swelling  GASTROINTESTINAL: No abdominal pain. No nausea, vomiting, or hematemesis; No diarrhea or constipation. No melena or hematochezia.  GENITOURINARY: No dysuria or hematuria, urinary frequency  NEUROLOGICAL: No headaches, memory loss, loss of strength, numbness, or tremors  SKIN: No itching, burning, rashes, or lesions     Vital Signs Last 24 Hrs  T(C): 36.3 (18 Jul 2022 04:11), Max: 37.1 (17 Jul 2022 15:24)  T(F): 97.3 (18 Jul 2022 04:11), Max: 98.8 (17 Jul 2022 15:24)  HR: 74 (18 Jul 2022 04:11) (63 - 74)  BP: 111/56 (18 Jul 2022 04:11) (111/56 - 157/90)  BP(mean): --  RR: 19 (18 Jul 2022 04:11) (16 - 19)  SpO2: 100% (18 Jul 2022 04:11) (99% - 100%)    Parameters below as of 18 Jul 2022 04:11  Patient On (Oxygen Delivery Method): room air        PHYSICAL EXAMINATION:  GENERAL: NAD, well built  HEAD:  Atraumatic, Normocephalic  EYES:  conjunctiva and sclera clear  NECK: Supple, No JVD, Normal thyroid  CHEST/LUNG: Clear to auscultation. Clear to percussion bilaterally; No rales, rhonchi, wheezing, or rubs  HEART: Regular rate and rhythm; No murmurs, rubs, or gallops  ABDOMEN: Soft, Nontender, Nondistended; Bowel sounds present  NERVOUS SYSTEM:  Alert & Oriented X3,    EXTREMITIES:  2+ Peripheral Pulses, No clubbing, cyanosis, or edema  SKIN: warm dry                          12.7   4.70  )-----------( 274      ( 17 Jul 2022 10:41 )             36.6     07-17    126<L>  |  94<L>  |  13  ----------------------------<  100<H>  4.1   |  23  |  0.78    Ca    9.5      17 Jul 2022 10:41    TPro  6.8  /  Alb  3.6  /  TBili  0.3  /  DBili  x   /  AST  19  /  ALT  23  /  AlkPhos  125<H>  07-17    LIVER FUNCTIONS - ( 17 Jul 2022 10:41 )  Alb: 3.6 g/dL / Pro: 6.8 g/dL / ALK PHOS: 125 U/L / ALT: 23 U/L DA / AST: 19 U/L / GGT: x           RADIOLOGY & ADDITIONAL TESTS:    TTE 7/17:    CONCLUSIONS:  1. Normal mitral valve.  2. Normal trileaflet aortic valve.  3. Aortic Root: 3.2 cm.  4. Normal left atrium.  LA volume index = 27 cc/m2.  5. Normal left ventricular internal dimensions and wall  thicknesses.  6. Normal Left Ventricular Systolic Function,  (EF = 55 to  60%)  7. Normal diastolic function.  8. Normal right atrium.  9. Normal right ventricular size and systolic function  (TAPSE  2.4cm).  10. Unable to estimate RVSP.  11. Normal tricuspid valve.  12. Normal pulmonic valve.  13. Small pericardial effusion,greatest diameter 1.0cm.    MRI 7/19: IMPRESSION: No acute infarct, hemorrhage, or mass effect.

## 2022-07-19 NOTE — PROGRESS NOTE ADULT - SUBJECTIVE AND OBJECTIVE BOX
NEUROLOGY FOLLOW-UP NOTE    NAME:  MARBELLA CALDWELL      ASSESSMENT:  62 RHF with likely syncopal event, also with flare-up of trigeminal neuralgia, but without neurodiagnostic evidence for seizure or stroke      RECOMMENDATIONS:    - Methylprednisolone 500mg IV BID for at least 4 doses to help treat current flare-up of trigeminal neuralgia       - If patient is responding to this, may continue with oral Methylprednisolone tapering course over 6 days       - Maintain GI prophylaxis while patient is receiving steroids    - Continue cardiovascular workup: Telemetry monitoring, Echocardiogram, Cardiology follow-up    - Monitor orthostatic BP & HR with each vital signs check    - Plentiful fluid hydration (2 liters, or 8 glasses, of water daily) encouraged    - Patient counseled to maintain caution with rapid changes in position    - Continue PT/OT to help with recovery of gait and balance    - DVT ppx: SCDs, Enoxaparin          NOTE TO BE COMPLETED - PLEASE REFER TO ABOVE ONLY AND IGNORE INFORMATION BELOW    ******************************    HPI:  This is a 62 year old female with atypical trigeminal neuralgia coming in for syncope. Pt states that earlier today she went to the bathroom and was straining on the toilet and started to get lightheaded. She states she felt light headed and tried to sit on the bed and passed out. Her  who was at home states he heard a bang while he was in the kitchen. Went to check in on her and found her on the ground. Pt does not recall the event and woke up on the bed. She denies any headaches, visual disturbances, N/V/D,  falls, chest pain, palpitations, lower extremity swelling, fevers, skin rash, recent travel, or sick contacts   (17 Jul 2022 13:24)      NEURO HPI:      INTERVAL HISTORY:      MEDICATIONS:  acetaminophen     Tablet .. 650 milliGRAM(s) Oral every 6 hours PRN  aluminum hydroxide/magnesium hydroxide/simethicone Suspension 30 milliLiter(s) Oral every 4 hours PRN  amitriptyline 50 milliGRAM(s) Oral daily  clonazePAM  Tablet 0.5 milliGRAM(s) Oral daily  enoxaparin Injectable 40 milliGRAM(s) SubCutaneous every 24 hours  melatonin 3 milliGRAM(s) Oral at bedtime PRN  methylPREDNISolone sodium succinate IVPB 500 milliGRAM(s) IV Intermittent two times a day  ondansetron Injectable 4 milliGRAM(s) IV Push every 8 hours PRN  OXcarbazepine 600 milliGRAM(s) Oral two times a day  pantoprazole    Tablet 40 milliGRAM(s) Oral before breakfast  polyethylene glycol 3350 17 Gram(s) Oral daily  senna 2 Tablet(s) Oral at bedtime  sertraline 100 milliGRAM(s) Oral daily  sodium chloride 0.9%. 1000 milliLiter(s) IV Continuous <Continuous>      ALLERGIES:  No Known Allergies      REVIEW OF SYSTEMS:  Fourteen systems reviewed and negative except as in HPI / Interval History.        OBJECTIVE:  Vital Signs Last 24 Hrs  T(C): 36.8 (19 Jul 2022 19:38), Max: 37.6 (19 Jul 2022 08:18)  T(F): 98.3 (19 Jul 2022 19:38), Max: 99.7 (19 Jul 2022 08:18)  HR: 74 (19 Jul 2022 19:38) (68 - 83)  BP: 120/75 (19 Jul 2022 19:38) (100/69 - 132/77)  BP(mean): 90 (19 Jul 2022 19:38) (79 - 95)  RR: 18 (19 Jul 2022 19:38) (17 - 18)  SpO2: 97% (19 Jul 2022 19:38) (97% - 100%)    Parameters below as of 19 Jul 2022 19:38  Patient On (Oxygen Delivery Method): room air        General Examination:  General: No acute distress  HEENT: Atraumatic, Normocephalic  Respiratory: CTA B/l.  No crackles, rhonchi, or wheezes.  Cardiovascular: RRR.  Normal S1 & S2.  Normal b/l radial and pedal pulses.    Neurological Examination:  General / Mental Status: AAO x 3.  No aphasia or dysarthria.  Naming and repetition intact.  Cranial Nerves: VFF x 4.  PERRL.  EOMI x 2, No nystagmus or diplopia.  B/l V1-V3 equal and intact to light touch and pinprick.  Symmetric facial movement and palate elevation.  B/l hearing equal to finger rub.  5/5 strength with b/l sternocleidomastoid & trapezius.  Midline tongue protrusion, with no atrophy or fasciculations.  Motor: Normal bulk & tone in all four extremities.  5/5 strength throughout all four extremities.  No downward drift, rigidity, spasticity, or tremors in any of the four extremities.  Sensory: Intact to light touch and pinprick in all four extremities.  Negative Romberg.  Reflex: 2+ and symmetric at b/l biceps, triceps, brachioradialis, patellae, and ankles.  Downgoing toes b/l.  Coordination: No dysmetria with b/l finger-to-nose and heel raise tests.  Symmetric rapid alternating movements b/l.  Gait: Normal, narrow-based gait.  No difficulty with tiptoe, heel, and tandem gaits.        LABORATORY VALUES:                          11.6   4.01  )-----------( 267      ( 19 Jul 2022 05:23 )             34.8       07-19    129<L>  |  94<L>  |  10  ----------------------------<  114<H>  4.3   |  27  |  0.75    Ca    9.7      19 Jul 2022 14:03  Phos  3.8     07-19  Mg     2.2     07-19    TPro  6.5  /  Alb  3.3<L>  /  TBili  0.4  /  DBili  x   /  AST  16  /  ALT  21  /  AlkPhos  127<H>  07-19      LIVER FUNCTIONS - ( 19 Jul 2022 05:23 )  Alb: 3.3 g/dL / Pro: 6.5 g/dL / ALK PHOS: 127 U/L / ALT: 21 U/L DA / AST: 16 U/L / GGT: x                 Glucose Trend  07-19-22 @ 14:03   -  -- 114<H> --  07-19-22 @ 05:23   -  -- 84 --  07-18-22 @ 10:04   -  -- 57<L> --  07-17-22 @ 10:41   -  -- 100<H> --  07-17-22 @ 10:36   -  -- -- 96                    NEUROIMAGING:          Please contact the Neurology consult service with any neurological questions.      Kalia Esparza MD   of Neurology  [facility]         NEUROLOGY FOLLOW-UP NOTE    NAME:  MARBELLA CALDWELL      ASSESSMENT:  62 RHF with likely syncopal event, also with flare-up of trigeminal neuralgia, but without neurodiagnostic evidence for seizure or stroke      RECOMMENDATIONS:    - Methylprednisolone 500mg IV BID for at least 4 doses to help treat current flare-up of trigeminal neuralgia       - If patient is responding to this, may continue with oral Methylprednisolone tapering course over 6 days       - Maintain GI prophylaxis while patient is receiving steroids    - Continue cardiovascular workup: Telemetry monitoring, Echocardiogram, Cardiology follow-up    - Monitor orthostatic BP & HR with each vital signs check    - Plentiful fluid hydration (2 liters, or 8 glasses, of water daily) encouraged    - Patient counseled to maintain caution with rapid changes in position    - Continue PT/OT to help with recovery of gait and balance    - DVT ppx: SCDs, Enoxaparin        ******************************    HPI:  This is a 62 year old female with atypical trigeminal neuralgia coming in for syncope. Pt states that earlier today she went to the bathroom and was straining on the toilet and started to get lightheaded. She states she felt lightheaded and tried to sit on the bed and passed out. Her  who was at home states he heard a bang while he was in the kitchen. Went to check in on her and found her on the ground. Pt does not recall the event and woke up on the bed. She denies any headaches, visual disturbances, N/V/D,  falls, chest pain, palpitations, lower extremity swelling, fevers, skin rash, recent travel, or sick contacts. (17 Jul 2022 13:24)      NEURO HPI:  62 RHF with trigeminal neuralgia, on Oxcarbazepine 600mg PO BID, who presented after having an episode of lightheadedness followed by loss of consciousness for an uncertain period of time.      INTERVAL HISTORY:  The patient has not had any episodes of lightheadedness or loss of consciousness overnight.      MEDICATIONS:  acetaminophen     Tablet .. 650 milliGRAM(s) Oral every 6 hours PRN  aluminum hydroxide/magnesium hydroxide/simethicone Suspension 30 milliLiter(s) Oral every 4 hours PRN  amitriptyline 50 milliGRAM(s) Oral daily  clonazePAM  Tablet 0.5 milliGRAM(s) Oral daily  enoxaparin Injectable 40 milliGRAM(s) SubCutaneous every 24 hours  melatonin 3 milliGRAM(s) Oral at bedtime PRN  methylPREDNISolone sodium succinate IVPB 500 milliGRAM(s) IV Intermittent two times a day  ondansetron Injectable 4 milliGRAM(s) IV Push every 8 hours PRN  OXcarbazepine 600 milliGRAM(s) Oral two times a day  pantoprazole    Tablet 40 milliGRAM(s) Oral before breakfast  polyethylene glycol 3350 17 Gram(s) Oral daily  senna 2 Tablet(s) Oral at bedtime  sertraline 100 milliGRAM(s) Oral daily  sodium chloride 0.9%. 1000 milliLiter(s) IV Continuous <Continuous>      ALLERGIES:  No Known Allergies      REVIEW OF SYSTEMS:  Fourteen systems reviewed and negative except as in HPI / Interval History.        OBJECTIVE:  Vital Signs Last 24 Hrs  T(C): 36.8 (19 Jul 2022 19:38), Max: 37.6 (19 Jul 2022 08:18)  T(F): 98.3 (19 Jul 2022 19:38), Max: 99.7 (19 Jul 2022 08:18)  HR: 74 (19 Jul 2022 19:38) (68 - 83)  BP: 120/75 (19 Jul 2022 19:38) (100/69 - 132/77)  BP(mean): 90 (19 Jul 2022 19:38) (79 - 95)  RR: 18 (19 Jul 2022 19:38) (17 - 18)  SpO2: 97% (19 Jul 2022 19:38) (97% - 100%)  Parameters below as of 19 Jul 2022 19:38  Patient On (Oxygen Delivery Method): room air    General Examination:  General: No acute distress  HEENT: Atraumatic, Normocephalic  Respiratory: CTA B/l.  No crackles, rhonchi, or wheezes.  Cardiovascular: RRR.  Normal S1 & S2.  Normal b/l radial and pedal pulses.    Neurological Examination:  General / Mental Status: AAO x 3.  No aphasia or dysarthria.  Naming and repetition intact.  Cranial Nerves: VFF x 4.  PERRL.  EOMI x 2, No nystagmus or diplopia.  B/l V1-V3 equal and intact to light touch and pinprick.  Symmetric facial movement and palate elevation.  B/l hearing equal to finger rub.  Negative Stockton-Hallpike maneuver b/l.  5/5 strength with b/l sternocleidomastoid & trapezius.  Midline tongue protrusion, with no atrophy or fasciculations.  Motor: Normal bulk & tone in all four extremities.  5/5 strength throughout all four extremities.  No downward drift, rigidity, spasticity, or tremors in any of the four extremities.  Sensory: Intact to light touch and pinprick in all four extremities.  Negative Romberg.  Reflex: 2+ and symmetric at b/l biceps, triceps, brachioradialis, patellae, and ankles.  Downgoing toes b/l.  Coordination: No dysmetria with b/l finger-to-nose and heel raise tests.  Symmetric rapid alternating movements b/l.  Gait and Romberg sign testing per patient preference.          LABORATORY VALUES:                          11.6   4.01  )-----------( 267      ( 19 Jul 2022 05:23 )             34.8       07-19    129<L>  |  94<L>  |  10  ----------------------------<  114<H>  4.3   |  27  |  0.75    Ca    9.7      19 Jul 2022 14:03  Phos  3.8     07-19  Mg     2.2     07-19    TPro  6.5  /  Alb  3.3<L>  /  TBili  0.4  /  DBili  x   /  AST  16  /  ALT  21  /  AlkPhos  127<H>  07-19    Glucose Trend  07-19-22 @ 14:03   -  -- 114<H> --  07-19-22 @ 05:23   -  -- 84 --  07-18-22 @ 10:04   -  -- 57<L> --  07-17-22 @ 10:41   -  -- 100<H> --  07-17-22 @ 10:36   -  -- -- 96    A1C with Estimated Average Glucose (07.18.22 @ 00:13)   A1C with Estimated Average Glucose Result: 5.3%   Estimated Average Glucose: 105 mg/dL     Thyroid Stimulating Hormone, Serum (07.18.22 @ 06:38)   Thyroid Stimulating Hormone, Serum: 2.30 uU/mL           NEUROIMAGING:      CT Head (7/17/22):  - No acute intracranial abnormality      MRI Brain w/wo Gadolinium (7/19/22):  - No acute intracranial abnormality or abnormal enhancement      Routine EEG (7/19/22):  Abnormal EEG in the awake and drowsy states.  - Moderate generalized background slowing.            Please contact the Neurology consult service with any neurological questions.      Kalia Esparza MD   of Neurology  [facility] at [facility]

## 2022-07-19 NOTE — PROGRESS NOTE ADULT - SUBJECTIVE AND OBJECTIVE BOX
[facility] NEPHROLOGY- PROGRESS NOTE    Patient is a 61yo Female with atypical trigeminal neuralgia on carbamazepine admitted with syncope and found to have serum Na 126. Nephrology consulted for hyponatremia.     Hospital Medications: Medications reviewed.  REVIEW OF SYSTEMS:  CONSTITUTIONAL: No fevers or chills  RESPIRATORY: No shortness of breath  CARDIOVASCULAR: No chest pain.  GASTROINTESTINAL: No nausea, vomiting, diarrhea or abdominal pain.   VASCULAR: No bilateral lower extremity edema.     VITALS:  T(F): 98.3 (07-19-22 @ 19:38), Max: 99.7 (07-19-22 @ 08:18)  HR: 74 (07-19-22 @ 19:38)  BP: 120/75 (07-19-22 @ 19:38)  RR: 18 (07-19-22 @ 19:38)  SpO2: 97% (07-19-22 @ 19:38)  Wt(kg): --  Height (cm): 172.7 (07-17 @ 10:07)  Weight (kg): 59 (07-17 @ 10:07)  BMI (kg/m2): 19.8 (07-17 @ 10:07)  BSA (m2): 1.7 (07-17 @ 10:07)    PHYSICAL EXAM:  Constitutional: NAD  Neurological: Awake and Alert  HEENT: anicteric sclera,   Respiratory: CTAB, no wheezes, rales or rhonchi  Cardiovascular: S1, S2, RRR  Gastrointestinal: BS+, soft, NT/ND  : No ledesma.  Extremities: No peripheral edema    LABS:  07-19  129 <--, 129 <--, 131 <--, 126 <--  129<L>  |  94<L>  |  10  ----------------------------<  114<H>  4.3   |  27  |  0.75    Ca    9.7      19 Jul 2022 14:03  Phos  3.8     07-19  Mg     2.2     07-19    TPro  6.5  /  Alb  3.3<L>  /  TBili  0.4  /  DBili      /  AST  16  /  ALT  21  /  AlkPhos  127<H>  07-19    Creatinine Trend: 0.75 <--, 0.61 <--, 0.75 <--, 0.78 <--                        11.6   4.01  )-----------( 196      ( 19 Jul 2022 05:23 )             34.8     Urine Studies:    Osmolality, Random Urine: 534 mos/kg (07-19 @ 14:11)  Sodium, Random Urine: 95 mmol/L (07-19 @ 14:11)  Sodium, Random Urine: 55 mmol/L (07-18 @ 17:08)    RADIOLOGY & ADDITIONAL STUDIES:

## 2022-07-20 ENCOUNTER — TRANSCRIPTION ENCOUNTER (OUTPATIENT)
Age: 62
End: 2022-07-20

## 2022-07-20 LAB
ALBUMIN SERPL ELPH-MCNC: 3.3 G/DL — LOW (ref 3.5–5)
ALP SERPL-CCNC: 130 U/L — HIGH (ref 40–120)
ALT FLD-CCNC: 36 U/L DA — SIGNIFICANT CHANGE UP (ref 10–60)
ANION GAP SERPL CALC-SCNC: 9 MMOL/L — SIGNIFICANT CHANGE UP (ref 5–17)
AST SERPL-CCNC: 25 U/L — SIGNIFICANT CHANGE UP (ref 10–40)
BILIRUB SERPL-MCNC: 0.3 MG/DL — SIGNIFICANT CHANGE UP (ref 0.2–1.2)
BUN SERPL-MCNC: 14 MG/DL — SIGNIFICANT CHANGE UP (ref 7–18)
CALCIUM SERPL-MCNC: 9.7 MG/DL — SIGNIFICANT CHANGE UP (ref 8.4–10.5)
CHLORIDE SERPL-SCNC: 98 MMOL/L — SIGNIFICANT CHANGE UP (ref 96–108)
CO2 SERPL-SCNC: 24 MMOL/L — SIGNIFICANT CHANGE UP (ref 22–31)
CREAT SERPL-MCNC: 0.72 MG/DL — SIGNIFICANT CHANGE UP (ref 0.5–1.3)
EGFR: 96 ML/MIN/1.73M2 — SIGNIFICANT CHANGE UP
GLUCOSE SERPL-MCNC: 133 MG/DL — HIGH (ref 70–99)
HCT VFR BLD CALC: 36 % — SIGNIFICANT CHANGE UP (ref 34.5–45)
HGB BLD-MCNC: 12.2 G/DL — SIGNIFICANT CHANGE UP (ref 11.5–15.5)
MAGNESIUM SERPL-MCNC: 2.2 MG/DL — SIGNIFICANT CHANGE UP (ref 1.6–2.6)
MCHC RBC-ENTMCNC: 29 PG — SIGNIFICANT CHANGE UP (ref 27–34)
MCHC RBC-ENTMCNC: 33.9 GM/DL — SIGNIFICANT CHANGE UP (ref 32–36)
MCV RBC AUTO: 85.7 FL — SIGNIFICANT CHANGE UP (ref 80–100)
NRBC # BLD: 0 /100 WBCS — SIGNIFICANT CHANGE UP (ref 0–0)
PHOSPHATE SERPL-MCNC: 3.9 MG/DL — SIGNIFICANT CHANGE UP (ref 2.5–4.5)
PLATELET # BLD AUTO: 278 K/UL — SIGNIFICANT CHANGE UP (ref 150–400)
POTASSIUM SERPL-MCNC: 4.8 MMOL/L — SIGNIFICANT CHANGE UP (ref 3.5–5.3)
POTASSIUM SERPL-SCNC: 4.8 MMOL/L — SIGNIFICANT CHANGE UP (ref 3.5–5.3)
PROT SERPL-MCNC: 6.8 G/DL — SIGNIFICANT CHANGE UP (ref 6–8.3)
RBC # BLD: 4.2 M/UL — SIGNIFICANT CHANGE UP (ref 3.8–5.2)
RBC # FLD: 13.6 % — SIGNIFICANT CHANGE UP (ref 10.3–14.5)
SODIUM SERPL-SCNC: 131 MMOL/L — LOW (ref 135–145)
T4 AB SER-ACNC: 5.7 UG/DL — SIGNIFICANT CHANGE UP (ref 4.6–12)
WBC # BLD: 2.12 K/UL — LOW (ref 3.8–10.5)
WBC # FLD AUTO: 2.12 K/UL — LOW (ref 3.8–10.5)

## 2022-07-20 RX ORDER — SODIUM CHLORIDE 9 MG/ML
2 INJECTION INTRAMUSCULAR; INTRAVENOUS; SUBCUTANEOUS
Refills: 0 | Status: DISCONTINUED | OUTPATIENT
Start: 2022-07-20 | End: 2022-07-21

## 2022-07-20 RX ADMIN — SENNA PLUS 2 TABLET(S): 8.6 TABLET ORAL at 21:35

## 2022-07-20 RX ADMIN — Medication 200 MILLIGRAM(S): at 17:37

## 2022-07-20 RX ADMIN — Medication 0.5 MILLIGRAM(S): at 21:35

## 2022-07-20 RX ADMIN — SODIUM CHLORIDE 2 GRAM(S): 9 INJECTION INTRAMUSCULAR; INTRAVENOUS; SUBCUTANEOUS at 17:38

## 2022-07-20 RX ADMIN — SERTRALINE 100 MILLIGRAM(S): 25 TABLET, FILM COATED ORAL at 11:48

## 2022-07-20 RX ADMIN — Medication 50 MILLIGRAM(S): at 11:48

## 2022-07-20 RX ADMIN — OXCARBAZEPINE 600 MILLIGRAM(S): 300 TABLET, FILM COATED ORAL at 17:38

## 2022-07-20 RX ADMIN — SODIUM CHLORIDE 1 GRAM(S): 9 INJECTION INTRAMUSCULAR; INTRAVENOUS; SUBCUTANEOUS at 05:59

## 2022-07-20 RX ADMIN — PANTOPRAZOLE SODIUM 40 MILLIGRAM(S): 20 TABLET, DELAYED RELEASE ORAL at 06:02

## 2022-07-20 RX ADMIN — OXCARBAZEPINE 600 MILLIGRAM(S): 300 TABLET, FILM COATED ORAL at 05:59

## 2022-07-20 RX ADMIN — ENOXAPARIN SODIUM 40 MILLIGRAM(S): 100 INJECTION SUBCUTANEOUS at 05:59

## 2022-07-20 RX ADMIN — Medication 200 MILLIGRAM(S): at 06:00

## 2022-07-20 NOTE — DISCHARGE NOTE PROVIDER - NSDCMRMEDTOKEN_GEN_ALL_CORE_FT
AMITRIPTYLINE HYDROCHLORI 50MG TAB:   CLONAZEPAM 0.5MG TAB:   OXCARBAZEPINE 300MG TAB:   SERTRALINE HYDROCHLORIDE 100MG TAB:    amitriptyline 50 mg oral tablet: 1 tab(s) orally once a day  clonazePAM 0.5 mg oral tablet: 1 tab(s) orally once a day (at bedtime)  Medrol Dosepak 4 mg oral tablet: 6 orally once a day x 1 days  5 orally once a day x 1 days  4 orally once a day x 1 days  3 orally once a day x 1 days  2 orally once a day x 1 days  1 orally once a day x 1 days  OXcarbazepine 600 mg oral tablet: 1 tab(s) orally 2 times a day  pantoprazole 40 mg oral delayed release tablet: 1 tab(s) orally once a day (before a meal)  sertraline 100 mg oral tablet: 1 tab(s) orally once a day  sodium chloride 1 g oral tablet: 2 tab(s) orally 2 times a day

## 2022-07-20 NOTE — PROGRESS NOTE ADULT - PROBLEM SELECTOR PROBLEM 3
Trigeminal neuralgia

## 2022-07-20 NOTE — DISCHARGE NOTE PROVIDER - NSDCCPCAREPLAN_GEN_ALL_CORE_FT
PRINCIPAL DISCHARGE DIAGNOSIS  Diagnosis: Syncope  Assessment and Plan of Treatment: Initial CTH was negative, orthostatics were negative. EKG was NSR and echo was also normal. Pt had an MRI that showed no acute infarct, hemorrhage, or mass effect. She was evaluated by neuro and recommended outpatient PT.   Patient also had hyponatremia  on admission. She was placed on fluid restriction and salt tabs and the sodium improved.  For trigeminal neuralgia, pt was started on solumedrol IV as per neurology recommendations, with GI PPx , to be transitioned to PO on discharge. She was continued on home medications Amitriptyline 50mg and oxcarbazepine.      SECONDARY DISCHARGE DIAGNOSES  Diagnosis: Hyponatremia  Assessment and Plan of Treatment:     Diagnosis: Trigeminal neuralgia  Assessment and Plan of Treatment:      PRINCIPAL DISCHARGE DIAGNOSIS  Diagnosis: Syncope  Assessment and Plan of Treatment: You presented with a syncopal episode. Initial CT head was negative. You had cardiac workup that was negative for any arrhythmia or ischemia. You also had a brain MRI that did not show any stroke. You were seen by physical therapy and recommended outpatient PT.   Your syncope was likely due to a flare of your trigeminal neuralgia. Please follow with your doctor after discharge.      SECONDARY DISCHARGE DIAGNOSES  Diagnosis: Syndrome of inappropriate ADH (SIADH) secretion  Assessment and Plan of Treatment: You presented with low sodium level of 126. Nephrologist evaluated you, it was likely due to a condition called SIADH that can happen in some people and can also be due to medications. You were started on fluid restriction and salt tabs and your sodium level improved. Please continue to take sodium chloride tablets twice a day as instructed. Some of the medications you are taking at home- oxcarbazepine/ zoloft and amitripytiline,can also cause SIADH, you can continue with them for now while taking your salt tablets. Please try to restrict water intake. Please follow with your neurologist after discharge to go over your medications and repeat your sodium level with your doctor within a week.       Diagnosis: Trigeminal neuralgia  Assessment and Plan of Treatment: You were treated with IV steroids for your pain and it improved. Please continue to take medrol dose fabian as instructed and continue with your  home medications Amitriptyline 50mg and oxcarbazepine. Please follow with your neurologist after discharge.     PRINCIPAL DISCHARGE DIAGNOSIS  Diagnosis: Syncope  Assessment and Plan of Treatment: You presented with a syncopal episode. Initial CT head was negative. You had cardiac workup that was negative for any arrhythmia or ischemia. You also had a brain MRI that did not show any stroke. You were seen by physical therapy and recommended outpatient PT.   Your syncope was likely due to a flare of your trigeminal neuralgia. Please follow with your doctor after discharge.      SECONDARY DISCHARGE DIAGNOSES  Diagnosis: Syndrome of inappropriate ADH (SIADH) secretion  Assessment and Plan of Treatment: You presented with low sodium level of 126. Nephrologist evaluated you, it was likely due to a condition called SIADH that can happen in some people and can also be due to medications. You were started on fluid restriction and salt tabs and your sodium level improved. Please continue to take sodium chloride tablets twice a day as instructed. Some of the medications you are taking at home- oxcarbazepine/ zoloft and amitripytiline,can also cause SIADH, you can continue with them for now while taking your salt tablets. Please try to restrict water intake. Please follow with your neurologist after discharge to go over your medications and repeat your sodium level with your doctor within a week. PLease follow with nephrologist Dr Parisi in 1 week.      Diagnosis: Trigeminal neuralgia  Assessment and Plan of Treatment: You were treated with IV steroids for your pain and it improved. Please continue to take medrol dose fabian as instructed and continue with your  home medications Amitriptyline 50mg and oxcarbazepine. Please follow with your neurologist after discharge.

## 2022-07-20 NOTE — PROGRESS NOTE ADULT - PROBLEM SELECTOR PLAN 1
p/w syncope lightheadedness, most likely vasovagal episode considering that the patient passed a bowel movement yesterday before the syncopal episode  orthostatic bp negative, recent BPs stable, no recent episodes of hypotension today   - TTE 7/17 WNL   - EKG 7/17- normal sinus rhythm   - CT head 7/17- no acute intracranial pathology   - MRI 7/19- No acute infarct, hemorrhage, or mass effect.  - PT 7/18 evaluation- recommended outpatient PT, 3-4x/week, for 4 weeks  - Cardio on board Dr. Castañeda  - Neuro on board Dr. Esparza  - d/c likely tomorrow

## 2022-07-20 NOTE — PROGRESS NOTE ADULT - ASSESSMENT
62 year old female with atypical trigeminal neuralgia,HTN  coming in for syncope,dizziness,weakness and hyponatremia.  1.D/C Tele monitoring.  2.Orthostatic bp-.  3.Hyponatremia-Renal f/u,nacl tid.  4.Neurology f/u.  5.Trigeminal neuralgia-OXcarbazepine.  6.GI and DVT prophylaxis.

## 2022-07-20 NOTE — PROGRESS NOTE ADULT - ASSESSMENT
This is a 62 year old female with atypical trigeminal neuralgia coming in for syncope. Pt states that earlier today she went to the bathroom and was straining on the toilet and started to get lightheaded. She states she felt light headed and tried to sit on the bed and passed out.  Endocrinology is consulted for orthostatic hypotension , hyponatremia and weight loss of 25-30 pounds in a few months        # less likely to be adrenal insufficiency in the setting of normal cortisol level   - cortisol and TSH normal   - would recommend to check free T4 to r/o secondary hypothyroidism .     Hyponatremia- improving on nacl tabs  Urine lytes consistent with SIADH   continue with salt tablets as per nephrolohgy    This is a 62 year old female with atypical trigeminal neuralgia coming in for syncope. Pt states that earlier today she went to the bathroom and was straining on the toilet and started to get lightheaded. She states she felt light headed and tried to sit on the bed and passed out.  Endocrinology is consulted for orthostatic hypotension , hyponatremia and weight loss of 25-30 pounds in a few months        # less likely to be adrenal insufficiency in the setting of normal cortisol level   - cortisol and TSH normal   - T4 normal     Hyponatremia- improving on nacl tabs  Urine lytes consistent with SIADH   continue with salt tablets as per nephrolohgy    This is a 62 year old female with atypical trigeminal neuralgia coming in for syncope. Pt states that earlier today she went to the bathroom and was straining on the toilet and started to get lightheaded. She states she felt light headed and tried to sit on the bed and passed out.  Endocrinology is consulted for orthostatic hypotension , hyponatremia and weight loss of 25-30 pounds in a few months        # less likely to be adrenal insufficiency in the setting of normal cortisol level   - cortisol and TSH normal   - T4 normal     Hyponatremia- improving on nacl tabs  Urine lytes consistent with SIADH   continue with salt tablets as per nephrolohgy ,

## 2022-07-20 NOTE — PROGRESS NOTE ADULT - SUBJECTIVE AND OBJECTIVE BOX
CHIEF COMPLAINT:Patient is a 62y old  Female who presents with a chief complaint of syncope .Pt appears comfortable.    	  REVIEW OF SYSTEMS:  CONSTITUTIONAL: No fever, weight loss, or fatigue  EYES: No eye pain, visual disturbances, or discharge  ENT:  No difficulty hearing, tinnitus, vertigo; No sinus or throat pain  NECK: No pain or stiffness  RESPIRATORY: No cough, wheezing, chills or hemoptysis; No Shortness of Breath  CARDIOVASCULAR: No chest pain, palpitations, passing out, dizziness, or leg swelling  GASTROINTESTINAL: No abdominal or epigastric pain. No nausea, vomiting, or hematemesis; No diarrhea or constipation. No melena or hematochezia.  GENITOURINARY: No dysuria, frequency, hematuria, or incontinence  NEUROLOGICAL: No headaches, memory loss, loss of strength, numbness, or tremors  SKIN: No itching, burning, rashes, or lesions   LYMPH Nodes: No enlarged glands  ENDOCRINE: No heat or cold intolerance; No hair loss  MUSCULOSKELETAL: No joint pain or swelling; No muscle, back, or extremity pain  PSYCHIATRIC: No depression, anxiety, mood swings, or difficulty sleeping  HEME/LYMPH: No easy bruising, or bleeding gums  ALLERGY AND IMMUNOLOGIC: No hives or eczema	        PHYSICAL EXAM:  T(C): 36.5 (22 @ 07:33), Max: 36.8 (22 @ 19:38)  HR: 68 (22 @ 04:31) (68 - 83)  BP: 127/80 (22 @ 04:31) (100/69 - 128/61)  RR: 18 (22 @ 07:33) (18 - 18)  SpO2: 98% (22 @ 07:33) (97% - 100%)  Wt(kg): --  I&O's Summary      Appearance: Normal	  HEENT:   Normal oral mucosa, PERRL, EOMI	  Lymphatic: No lymphadenopathy  Cardiovascular: Normal S1 S2, No JVD, No murmurs, No edema  Respiratory: Lungs clear to auscultation	  Psychiatry: A & O x 3, Mood & affect appropriate  Gastrointestinal:  Soft, Non-tender, + BS	  Skin: No rashes, No ecchymoses, No cyanosis	  Neurologic: Non-focal  Extremities: Normal range of motion, No clubbing, cyanosis or edema  Vascular: Peripheral pulses palpable 2+ bilaterally    MEDICATIONS  (STANDING):  amitriptyline 50 milliGRAM(s) Oral daily  clonazePAM  Tablet 0.5 milliGRAM(s) Oral at bedtime  enoxaparin Injectable 40 milliGRAM(s) SubCutaneous every 24 hours  methylPREDNISolone sodium succinate IVPB 500 milliGRAM(s) IV Intermittent two times a day  OXcarbazepine 600 milliGRAM(s) Oral two times a day  pantoprazole    Tablet 40 milliGRAM(s) Oral before breakfast  polyethylene glycol 3350 17 Gram(s) Oral daily  senna 2 Tablet(s) Oral at bedtime  sertraline 100 milliGRAM(s) Oral daily  sodium chloride 1 Gram(s) Oral three times a day      TELEMETRY: 	nsr    	  	  LABS:	 	      Troponin I, High Sensitivity Result: 5.6 ng/L ( @ 10:41)                            12.2   2.12  )-----------( 278      ( 2022 07:10 )             36.0     07-20    131<L>  |  98  |  14  ----------------------------<  133<H>  4.8   |  24  |  0.72    Ca    9.7      2022 07:10  Phos  3.9     07-20  Mg     2.2     07-20    TPro  6.8  /  Alb  3.3<L>  /  TBili  0.3  /  DBili  x   /  AST  25  /  ALT  36  /  AlkPhos  130<H>  07-20      TSH: Thyroid Stimulating Hormone, Serum: 2.30 uU/mL ( @ 06:38)      	  < from: MR Head w/wo IV Cont (22 @ 14:23) >  ACC: [id number] EXAM:  MR BRAIN [facility]                          PROCEDURE DATE:  2022          INTERPRETATION:  CLINICAL INFORMATION: Syncope and collapse    TECHNIQUE: Multiplanar, multisequence brain MRI with and without   contrast. 6 cc contrast agent Gadavist.    COMPARISON: Prior CT head 2022.    FINDINGS:    There is no evidence of acute infarct. There are no foci of   susceptibility artifact to suggest hemorrhage.  The ventricles are normal   in size for patient's age.    Flow voids of the major intracranial vessels at the skull base follow   expected course and contour. No abnormal intracranial enhancement.    The paranasal sinuses demonstrate no signal abnormality. The mastoids   demonstrate no signal abnormality.  Bilateral orbits are within normal   limits.      IMPRESSION: No acute infarct, hemorrhage, or mass effect.    --- End of Report ---          PEDRO LUIS LEE MD; Resident Radiologist  This document has been electronically signed.  VASYL JOHNSON MD; Attending Radiologist  This document has been electronically signed. 2022  4:31PM    < end of copied text >      eEEG REPORT:   EEG Report:  · EEG Report	  [facility]  Report of Routine EEG with Video    [facility]: [address], [place] [address], Phone [phone]  [place] Office: [address], [address], [place] [address] Phone [phone]    [facility]: [address] [place] [address], Phone [phone]  [place] Office: [address] [place] [address], Phone [phone]    Patient Name: MARBELLA CALDWELL    Age: 62 year  : 1960  Patient ID: -, MRN #: [id number], Location: [address]  Referring Physician: Dr. An  EEG #: [id number]    Study Date: 2022     Technical Information:					  On Instrument: -  Placement and Labeling of Electrodes:  The EEG was performed utilizing 20 channels referential EEG connections (coronal over temporal over parasagittal montage) using all standard 10-20 electrode placements with EKG.  Recording was at a sampling rate of 256 samples per second per channel.  Time synchronized digital video recording was done simultaneously with EEG recording.  A low light infrared camera was used for low light recording.  Hugo and seizure detection algorithms were utilized.    History:  Routine study..Performed bedside  Patient awake and alert  HV not performed since covid and photic performed  61 Y/O female presents with:  Syncope and collapse  R/O seizures    Pertinent Medication  Oxcarbazepine 600mg PO BID, Clonazepam 0.5mg PO Daily    Study Interpretation:  Findings: The background was continuous and reactive. During wakefulness, the posterior dominant rhythm consisted of symmetric, well-modulated 7.5 Hz activity, which is lower than normal for age, with amplitude to 30 uV, that attenuated to eye opening.     Background Slowing:  Diffuse theta frequency slowing.    Focal Slowing:   None were present.    Sleep Background:  Drowsiness was characterized by fragmentation, attenuation, and slowing of the background activity.    Stage II sleep transients were not recorded.    Other Non-Epileptiform Findings:  None were present.    Interictal Epileptiform Activity:   None were present.    Events:  No event or seizure recorded.    Activation Procedures:   Hyperventilation was not performed.    Photic stimulation was performed and did not elicit any abnormalities.      Artifacts:  Intermittent myogenic and movement artifacts were noted.    ECG:  The heart rate on single channel ECG was predominantly between 70 and 80 BPM.    EEG Summary / Classification:  Abnormal EEG in the awake and drowsy states.  - Moderate generalized background slowing.    EEG Impression / Clinical Correlate:    Abnormal EEG study.    Generalized background slowing is a nonspecific finding that can be seen in the setting of diffuse or multifocal structural abnormalities of the brain (including anoxic brain injury), toxic or metabolic encephalopathy, medication side effect, or infection.  No evidence of seizure tendency was identified.    ________________________________________    Kalia Esparza MD  Attending Physician, [facility]        Electronic Signatures:  Kalia Esparza)  (Signed 2022 19:50)  	Authored: EEG REPORT

## 2022-07-20 NOTE — PROGRESS NOTE ADULT - PROBLEM SELECTOR PLAN 3
Pt has a history of Trigeminal neuralgia. Has not been experiencing   - Pt. was placed on solumedrol IV as per neurology recommendations, with GI PPx   - Will transition to PO steriods tomorrow   - Will continue pt's home medication Amitriptyline 50mg and oxcarbazepine Pt has a history of Trigeminal neuralgia. Has not been experiencing   - Pt. was placed on solumedrol IV as per neurology recommendations, with GI PPx   - Will transition to PO steroids tomorrow   - Will continue pt's home medication Amitriptyline 50mg and oxcarbazepine

## 2022-07-20 NOTE — PROGRESS NOTE ADULT - SUBJECTIVE AND OBJECTIVE BOX
INTERVAL HPI/OVERNIGHT EVENTS:       REVIEW OF SYSTEMS:  CONSTITUTIONAL: No fever, weight loss, or fatigue  RESPIRATORY: No cough, wheezing, chills or hemoptysis; No shortness of breath  CARDIOVASCULAR: No chest pain, palpitations, dizziness, or leg swelling  GASTROINTESTINAL: No abdominal pain. No nausea, vomiting, or hematemesis; No diarrhea or constipation. No melena or hematochezia.  GENITOURINARY: No dysuria or hematuria, urinary frequency  NEUROLOGICAL: No headaches, memory loss, loss of strength, numbness, or tremors  SKIN: No itching, burning, rashes, or lesions     MEDICATIONS  (STANDING):  amitriptyline 50 milliGRAM(s) Oral daily  clonazePAM  Tablet 0.5 milliGRAM(s) Oral at bedtime  enoxaparin Injectable 40 milliGRAM(s) SubCutaneous every 24 hours  methylPREDNISolone sodium succinate IVPB 500 milliGRAM(s) IV Intermittent two times a day  OXcarbazepine 600 milliGRAM(s) Oral two times a day  pantoprazole    Tablet 40 milliGRAM(s) Oral before breakfast  polyethylene glycol 3350 17 Gram(s) Oral daily  senna 2 Tablet(s) Oral at bedtime  sertraline 100 milliGRAM(s) Oral daily  sodium chloride 1 Gram(s) Oral three times a day    MEDICATIONS  (PRN):  acetaminophen     Tablet .. 650 milliGRAM(s) Oral every 6 hours PRN Temp greater or equal to 38C (100.4F), Mild Pain (1 - 3)  aluminum hydroxide/magnesium hydroxide/simethicone Suspension 30 milliLiter(s) Oral every 4 hours PRN Dyspepsia  melatonin 3 milliGRAM(s) Oral at bedtime PRN Insomnia  ondansetron Injectable 4 milliGRAM(s) IV Push every 8 hours PRN Nausea and/or Vomiting      Vital Signs Last 24 Hrs  T(C): 36.5 (20 Jul 2022 07:33), Max: 36.8 (19 Jul 2022 19:38)  T(F): 97.7 (20 Jul 2022 07:33), Max: 98.3 (19 Jul 2022 19:38)  HR: 68 (20 Jul 2022 04:31) (68 - 83)  BP: 127/80 (20 Jul 2022 04:31) (100/69 - 128/61)  BP(mean): 90 (19 Jul 2022 19:38) (79 - 90)  RR: 18 (20 Jul 2022 07:33) (18 - 18)  SpO2: 98% (20 Jul 2022 07:33) (97% - 100%)    Parameters below as of 20 Jul 2022 07:33  Patient On (Oxygen Delivery Method): room air        PHYSICAL EXAMINATION:  GENERAL: NAD, well built  HEAD:  Atraumatic, Normocephalic  EYES:  conjunctiva and sclera clear  NECK: Supple, No JVD, Normal thyroid  CHEST/LUNG: Clear to auscultation. Clear to percussion bilaterally; No rales, rhonchi, wheezing, or rubs  HEART: Regular rate and rhythm; No murmurs, rubs, or gallops  ABDOMEN: Soft, Nontender, Nondistended; Bowel sounds present  NERVOUS SYSTEM:  Alert & Oriented X3,    EXTREMITIES:  2+ Peripheral Pulses, No clubbing, cyanosis, or edema  SKIN: warm dry                          12.2   2.12  )-----------( 278      ( 20 Jul 2022 07:10 )             36.0     07-20    131<L>  |  98  |  14  ----------------------------<  133<H>  4.8   |  24  |  0.72    Ca    9.7      20 Jul 2022 07:10  Phos  3.9     07-20  Mg     2.2     07-20    TPro  6.8  /  Alb  3.3<L>  /  TBili  0.3  /  DBili  x   /  AST  25  /  ALT  36  /  AlkPhos  130<H>  07-20    LIVER FUNCTIONS - ( 20 Jul 2022 07:10 )  Alb: 3.3 g/dL / Pro: 6.8 g/dL / ALK PHOS: 130 U/L / ALT: 36 U/L DA / AST: 25 U/L / GGT: x                       CAPILLARY BLOOD GLUCOSE  CAPILLARY BLOOD GLUCOSE        CAPILLARY BLOOD GLUCOSE          RADIOLOGY & ADDITIONAL TESTS:                       INTERVAL HPI/OVERNIGHT EVENTS:   Patients examined bedside , she is comfortable , NAD, her Na improving with salt tablets .     REVIEW OF SYSTEMS:  CONSTITUTIONAL: No fever, weight loss, or fatigue  RESPIRATORY: No cough, wheezing, chills or hemoptysis; No shortness of breath  CARDIOVASCULAR: No chest pain, palpitations, dizziness, or leg swelling  GASTROINTESTINAL: No abdominal pain. No nausea, vomiting, or hematemesis; No diarrhea or constipation. No melena or hematochezia.  GENITOURINARY: No dysuria or hematuria, urinary frequency  NEUROLOGICAL: No headaches, memory loss, loss of strength, numbness, or tremors  SKIN: No itching, burning, rashes, or lesions     MEDICATIONS  (STANDING):  amitriptyline 50 milliGRAM(s) Oral daily  clonazePAM  Tablet 0.5 milliGRAM(s) Oral at bedtime  enoxaparin Injectable 40 milliGRAM(s) SubCutaneous every 24 hours  methylPREDNISolone sodium succinate IVPB 500 milliGRAM(s) IV Intermittent two times a day  OXcarbazepine 600 milliGRAM(s) Oral two times a day  pantoprazole    Tablet 40 milliGRAM(s) Oral before breakfast  polyethylene glycol 3350 17 Gram(s) Oral daily  senna 2 Tablet(s) Oral at bedtime  sertraline 100 milliGRAM(s) Oral daily  sodium chloride 1 Gram(s) Oral three times a day    MEDICATIONS  (PRN):  acetaminophen     Tablet .. 650 milliGRAM(s) Oral every 6 hours PRN Temp greater or equal to 38C (100.4F), Mild Pain (1 - 3)  aluminum hydroxide/magnesium hydroxide/simethicone Suspension 30 milliLiter(s) Oral every 4 hours PRN Dyspepsia  melatonin 3 milliGRAM(s) Oral at bedtime PRN Insomnia  ondansetron Injectable 4 milliGRAM(s) IV Push every 8 hours PRN Nausea and/or Vomiting      Vital Signs Last 24 Hrs  T(C): 36.5 (20 Jul 2022 07:33), Max: 36.8 (19 Jul 2022 19:38)  T(F): 97.7 (20 Jul 2022 07:33), Max: 98.3 (19 Jul 2022 19:38)  HR: 68 (20 Jul 2022 04:31) (68 - 83)  BP: 127/80 (20 Jul 2022 04:31) (100/69 - 128/61)  BP(mean): 90 (19 Jul 2022 19:38) (79 - 90)  RR: 18 (20 Jul 2022 07:33) (18 - 18)  SpO2: 98% (20 Jul 2022 07:33) (97% - 100%)    Parameters below as of 20 Jul 2022 07:33  Patient On (Oxygen Delivery Method): room air        PHYSICAL EXAMINATION:  GENERAL: NAD, well built  HEAD:  Atraumatic, Normocephalic  EYES:  conjunctiva and sclera clear  NECK: Supple, No JVD, Normal thyroid  CHEST/LUNG: Clear to auscultation. Clear to percussion bilaterally; No rales, rhonchi, wheezing, or rubs  HEART: Regular rate and rhythm; No murmurs, rubs, or gallops  ABDOMEN: Soft, Nontender, Nondistended; Bowel sounds present  NERVOUS SYSTEM:  Alert & Oriented X3,    EXTREMITIES:  2+ Peripheral Pulses, No clubbing, cyanosis, or edema  SKIN: warm dry                          12.2   2.12  )-----------( 278      ( 20 Jul 2022 07:10 )             36.0     07-20    131<L>  |  98  |  14  ----------------------------<  133<H>  4.8   |  24  |  0.72    Ca    9.7      20 Jul 2022 07:10  Phos  3.9     07-20  Mg     2.2     07-20    TPro  6.8  /  Alb  3.3<L>  /  TBili  0.3  /  DBili  x   /  AST  25  /  ALT  36  /  AlkPhos  130<H>  07-20    LIVER FUNCTIONS - ( 20 Jul 2022 07:10 )  Alb: 3.3 g/dL / Pro: 6.8 g/dL / ALK PHOS: 130 U/L / ALT: 36 U/L DA / AST: 25 U/L / GGT: x                       CAPILLARY BLOOD GLUCOSE  CAPILLARY BLOOD GLUCOSE        CAPILLARY BLOOD GLUCOSE          RADIOLOGY & ADDITIONAL TESTS:

## 2022-07-20 NOTE — PROGRESS NOTE ADULT - PROBLEM SELECTOR PLAN 2
p/w hyponatremia Na 126  do not correct by more than 8 meq in 24hr  Goal Na: 134  - Na 126 > 131 > 129 > 131  - urine Na = 55 > 95  - urine osm = 534  - Nephro- d/c IVF, 1g sodium tablet given, less than 1L of water fluid restriction  - Nephro - carbamazepine can also cause SIADH  - renin and aldosterone ordered, pending results  - follow AM BMP  - Monitor serum sodium p/w hyponatremia Na 126  do not correct by more than 8 meq in 24hr  Goal Na: 134  - Na 126 > 131 > 129 > 131  - urine Na = 55 > 95  - urine osm = 534  - Nephro- IVF were d/c'd yesterday, less than 1L of water fluid restriction, pt. on sodium tablets  - renin and aldosterone ordered, pending results  - follow AM BMP  - Monitor serum sodium Statement Selected

## 2022-07-20 NOTE — DISCHARGE NOTE PROVIDER - HOSPITAL COURSE
This is a 62 year old female with atypical trigeminal neuralgia who presented for syncopal episode. Admitted for syncope w/u. Initial CTH was negative, orthostatics were negative. EKG was NSR and echo was also normal. Pt had an MRI that showed no acute infarct, hemorrhage, or mass effect. She was evaluated by neuro and recommended outpatient PT.   Patient also had hyponatremia  on admission. She was placed on fluid restriction and salt tabs and the sodium improved.  For trigeminal neuralgia, pt was started on solumedrol IV as per neurology recommendations, with GI PPx , to be transitioned to PO on discharge. She was continued on home medications Amitriptyline 50mg and oxcarbazepine.    Patient is stable for discharge per attending and is advised to follow up with PCP as outpatient  Please refer to patient's complete medical chart with documents for a full hospital course, for this is only a brief summary.

## 2022-07-20 NOTE — PROGRESS NOTE ADULT - ASSESSMENT
Patient is a 61yo Female with atypical trigeminal neuralgia on carbamazepine admitted with syncope and found to have serum Na 126. Nephrology consulted for hyponatremia.       1. Hyponatremia- urine studies consistent with SIADH. Mild improvement in serum Na. Increase NaCl 2g PO bid. c/w 1L/day fluid restriction. TSH wnl and am cortisol wnl.   Please keep off IVF. oxcarbazepine/ zoloft and amitripytiline,can also cause SIADH, will continue for now.   Monitor serum sodium.    2. Syncope- w/u as per primary team    3. Trigeminal neuralgia- now on oxcarbazepine/ zoloft and amitripytiline, which an also cause SIADH; will monitor for now.       [facility] NEPHROLOG[facility]  Dwaine Gold M.D.  Christiano Maza D.O.  Sanam Parisi M.D.  Ana Burgess, MSN, ANP-C  [phone]    [address] [place] [address]

## 2022-07-20 NOTE — PROGRESS NOTE ADULT - SUBJECTIVE AND OBJECTIVE BOX
PGY-1 Progress Note discussed with attending    CHIEF COMPLAINT & BRIEF HOSPITAL COURSE:    This is a 62 year old female with atypical trigeminal neuralgia coming in for syncopal episode. Admitted for syncope w/u.     INTERVAL HPI/OVERNIGHT EVENTS:     Pt. had no acute events overnight, no recent complaints. Pt. endorses no episodes of trigeminal neuralgia symptoms, and denies lightheadedness, dizziness, headaches, SOB, chest pain.     MEDICATIONS  (STANDING):  amitriptyline 50 milliGRAM(s) Oral daily  clonazePAM  Tablet 0.5 milliGRAM(s) Oral daily  enoxaparin Injectable 40 milliGRAM(s) SubCutaneous every 24 hours  lactated ringers. 1000 milliLiter(s) (60 mL/Hr) IV Continuous <Continuous>  OXcarbazepine 600 milliGRAM(s) Oral two times a day  polyethylene glycol 3350 17 Gram(s) Oral daily  senna 2 Tablet(s) Oral at bedtime  sertraline 100 milliGRAM(s) Oral daily    MEDICATIONS  (PRN):  acetaminophen     Tablet .. 650 milliGRAM(s) Oral every 6 hours PRN Temp greater or equal to 38C (100.4F), Mild Pain (1 - 3)  aluminum hydroxide/magnesium hydroxide/simethicone Suspension 30 milliLiter(s) Oral every 4 hours PRN Dyspepsia  melatonin 3 milliGRAM(s) Oral at bedtime PRN Insomnia  ondansetron Injectable 4 milliGRAM(s) IV Push every 8 hours PRN Nausea and/or Vomiting      REVIEW OF SYSTEMS:  CONSTITUTIONAL: No fever, weight loss, or fatigue  RESPIRATORY: No cough, wheezing, chills or hemoptysis; No shortness of breath  CARDIOVASCULAR: No chest pain, palpitations, dizziness, or leg swelling  GASTROINTESTINAL: No abdominal pain. No nausea, vomiting, or hematemesis; No diarrhea or constipation. No melena or hematochezia.  GENITOURINARY: No dysuria or hematuria, urinary frequency  NEUROLOGICAL: No headaches, memory loss, loss of strength, numbness, or tremors  SKIN: No itching, burning, rashes, or lesions     Vital Signs Last 24 Hrs  T(C): 36.3 (18 Jul 2022 04:11), Max: 37.1 (17 Jul 2022 15:24)  T(F): 97.3 (18 Jul 2022 04:11), Max: 98.8 (17 Jul 2022 15:24)  HR: 74 (18 Jul 2022 04:11) (63 - 74)  BP: 111/56 (18 Jul 2022 04:11) (111/56 - 157/90)  BP(mean): --  RR: 19 (18 Jul 2022 04:11) (16 - 19)  SpO2: 100% (18 Jul 2022 04:11) (99% - 100%)    Parameters below as of 18 Jul 2022 04:11  Patient On (Oxygen Delivery Method): room air        PHYSICAL EXAMINATION:  GENERAL: NAD, well built  HEAD:  Atraumatic, Normocephalic  EYES:  conjunctiva and sclera clear  NECK: Supple, No JVD, Normal thyroid  CHEST/LUNG: Clear to auscultation. Clear to percussion bilaterally; No rales, rhonchi, wheezing, or rubs  HEART: Regular rate and rhythm; No murmurs, rubs, or gallops  ABDOMEN: Soft, Nontender, Nondistended; Bowel sounds present  NERVOUS SYSTEM:  Alert & Oriented X3,    EXTREMITIES:  2+ Peripheral Pulses, No clubbing, cyanosis, or edema  SKIN: warm dry                          12.7   4.70  )-----------( 274      ( 17 Jul 2022 10:41 )             36.6     07-17    126<L>  |  94<L>  |  13  ----------------------------<  100<H>  4.1   |  23  |  0.78    Ca    9.5      17 Jul 2022 10:41    TPro  6.8  /  Alb  3.6  /  TBili  0.3  /  DBili  x   /  AST  19  /  ALT  23  /  AlkPhos  125<H>  07-17    LIVER FUNCTIONS - ( 17 Jul 2022 10:41 )  Alb: 3.6 g/dL / Pro: 6.8 g/dL / ALK PHOS: 125 U/L / ALT: 23 U/L DA / AST: 19 U/L / GGT: x           RADIOLOGY & ADDITIONAL TESTS:    TTE 7/17:    CONCLUSIONS:  1. Normal mitral valve.  2. Normal trileaflet aortic valve.  3. Aortic Root: 3.2 cm.  4. Normal left atrium.  LA volume index = 27 cc/m2.  5. Normal left ventricular internal dimensions and wall  thicknesses.  6. Normal Left Ventricular Systolic Function,  (EF = 55 to  60%)  7. Normal diastolic function.  8. Normal right atrium.  9. Normal right ventricular size and systolic function  (TAPSE  2.4cm).  10. Unable to estimate RVSP.  11. Normal tricuspid valve.  12. Normal pulmonic valve.  13. Small pericardial effusion,greatest diameter 1.0cm.    MRI 7/19: IMPRESSION: No acute infarct, hemorrhage, or mass effect.

## 2022-07-20 NOTE — DISCHARGE NOTE PROVIDER - CARE PROVIDER_API CALL
Kalia Esparza)  Neurology  Epilepsy  [address], [address]  [place] [address]  Phone: [phone]  Fax: ()-  Follow Up Time:    Kalia Esparza)  Neurology  Epilepsy  [address], [address]  [place] [address]  Phone: [phone]  Fax: ()-  Follow Up Time:     Sanam Parisi)  Internal Medicine  [address] [place] [id number]  Phone: [phone]  Fax: [phone]  Follow Up Time: 1 week

## 2022-07-20 NOTE — PROGRESS NOTE ADULT - SUBJECTIVE AND OBJECTIVE BOX
[facility] NEPHROLOGY- PROGRESS NOTE    Patient is a 63yo Female with atypical trigeminal neuralgia on carbamazepine admitted with syncope and found to have serum Na 126. Nephrology consulted for hyponatremia.     Hospital Medications: Medications reviewed.  REVIEW OF SYSTEMS:  CONSTITUTIONAL: No fevers or chills  RESPIRATORY: No shortness of breath  CARDIOVASCULAR: No chest pain.  GASTROINTESTINAL: No nausea, vomiting, diarrhea or abdominal pain.   VASCULAR: No bilateral lower extremity edema.     VITALS:  T(F): 98 (07-20-22 @ 15:50), Max: 98.3 (07-19-22 @ 19:38)  HR: 72 (07-20-22 @ 15:50)  BP: 108/66 (07-20-22 @ 15:50)  RR: 18 (07-20-22 @ 15:50)  SpO2: 99% (07-20-22 @ 15:50)  Wt(kg): --    07-20 @ 07:01  -  07-20 @ 16:54  --------------------------------------------------------  IN: 450 mL / OUT: 0 mL / NET: 450 mL        PHYSICAL EXAM:  Constitutional: NAD  Neurological: Awake and Alert  HEENT: anicteric sclera,   Respiratory: CTAB, no wheezes, rales or rhonchi  Cardiovascular: S1, S2, RRR  Gastrointestinal: BS+, soft, NT/ND  : No ledesma.  Extremities: No peripheral edema    LABS:  07-20  131 <--, 129 <--, 129 <--, 131 <--, 126 <--  131<L>  |  98  |  14  ----------------------------<  133<H>  4.8   |  24  |  0.72    Ca    9.7      20 Jul 2022 07:10  Phos  3.9     07-20  Mg     2.2     07-20    TPro  6.8  /  Alb  3.3<L>  /  TBili  0.3  /  DBili      /  AST  25  /  ALT  36  /  AlkPhos  130<H>  07-20    Creatinine Trend: 0.72 <--, 0.75 <--, 0.61 <--, 0.75 <--, 0.78 <--                        12.2   2.12  )-----------( 278      ( 20 Jul 2022 07:10 )             36.0     Urine Studies:    Osmolality, Random Urine: 534 mos/kg (07-19 @ 14:11)  Sodium, Random Urine: 95 mmol/L (07-19 @ 14:11)  Sodium, Random Urine: 55 mmol/L (07-18 @ 17:08)        RADIOLOGY & ADDITIONAL STUDIES:

## 2022-07-20 NOTE — PROGRESS NOTE ADULT - ATTENDING COMMENTS
Pt's syncope appears clinically consistent with a vasovagal syncope (as it was immediately post bm /micturition) hence will monitor orthostatics closely. However will also r/o pt for any arrythmia with full syncope w/u in telemetry. Will f/u a.m. cortisol and renin / aldosterone levels.    7/19/22 - Pt appearing with persistent hyponatremia, only slightly improved with NS IVF support. SIADH secondary to hypovolemia could be a likely etiology (vs having a reset osmo stat). Given pt's overall feeble state with reports of unexplained weight loss over that past 2 years (despite numerous outpatient workups) in addition to borderline orthostatic hypotension merits an endocrine w/u. Will f/u a.m. cortisol, mineral-corticoid; renin / aldosterone levels.    7/20/22 - Endocrine w/u unremarkable. Pt placed on NaCl tabs. Orthostatic hypotension not clinically significant. Instructed pt to f/u as outpatient for tilt table test. Pt medically stable for d/c with further f/u to be done as outpt.
Pt's syncope appears clinically consistent with a vasovagal syncope (as it was immediately post bm /micturition) hence will monitor orthostatics closely. However will also r/o pt for any arrythmia with full syncope w/u in telemetry. Will f/u a.m. cortisol and renin / aldosterone levels.    7/19/22 - Pt appearing with persistent hyponatremia, only slightly improved with NS IVF support. SIADH secondary to hypovolemia could be a likely etiology (vs having a reset osmo stat). Given pt's overall feeble state with reports of unexplained weight loss over that past 2 years (despite numerous outpatient workups) in addition to borderline orthostatic hypotension merits an endocrine w/u. Will f/u a.m. cortisol, mineral-corticoid; renin / aldosterone levels.
Pt's syncope appears clinically consistent with a vasovagal syncope (as it was immediately post bm /micturition) hence will monitor orthostatics closely. However will also r/o pt for any arrythmia with full syncope w/u in telemetry. Will f/u a.m. cortisol and renin / aldosterone levels.

## 2022-07-20 NOTE — DISCHARGE NOTE PROVIDER - CARE PROVIDERS DIRECT ADDRESSES
[email or web address]

## 2022-07-21 ENCOUNTER — TRANSCRIPTION ENCOUNTER (OUTPATIENT)
Age: 62
End: 2022-07-21

## 2022-07-21 VITALS
TEMPERATURE: 98 F | HEART RATE: 77 BPM | OXYGEN SATURATION: 98 % | RESPIRATION RATE: 18 BRPM | SYSTOLIC BLOOD PRESSURE: 111 MMHG | DIASTOLIC BLOOD PRESSURE: 72 MMHG

## 2022-07-21 LAB
ALBUMIN SERPL ELPH-MCNC: 3.3 G/DL — LOW (ref 3.5–5)
ALP SERPL-CCNC: 119 U/L — SIGNIFICANT CHANGE UP (ref 40–120)
ALT FLD-CCNC: 36 U/L DA — SIGNIFICANT CHANGE UP (ref 10–60)
ANION GAP SERPL CALC-SCNC: 8 MMOL/L — SIGNIFICANT CHANGE UP (ref 5–17)
AST SERPL-CCNC: 16 U/L — SIGNIFICANT CHANGE UP (ref 10–40)
BILIRUB SERPL-MCNC: 0.2 MG/DL — SIGNIFICANT CHANGE UP (ref 0.2–1.2)
BUN SERPL-MCNC: 18 MG/DL — SIGNIFICANT CHANGE UP (ref 7–18)
CALCIUM SERPL-MCNC: 9.2 MG/DL — SIGNIFICANT CHANGE UP (ref 8.4–10.5)
CHLORIDE SERPL-SCNC: 103 MMOL/L — SIGNIFICANT CHANGE UP (ref 96–108)
CO2 SERPL-SCNC: 24 MMOL/L — SIGNIFICANT CHANGE UP (ref 22–31)
CREAT SERPL-MCNC: 0.7 MG/DL — SIGNIFICANT CHANGE UP (ref 0.5–1.3)
EGFR: 99 ML/MIN/1.73M2 — SIGNIFICANT CHANGE UP
GLUCOSE SERPL-MCNC: 110 MG/DL — HIGH (ref 70–99)
HCT VFR BLD CALC: 33.4 % — LOW (ref 34.5–45)
HGB BLD-MCNC: 11.1 G/DL — LOW (ref 11.5–15.5)
MAGNESIUM SERPL-MCNC: 2.4 MG/DL — SIGNIFICANT CHANGE UP (ref 1.6–2.6)
MCHC RBC-ENTMCNC: 29.1 PG — SIGNIFICANT CHANGE UP (ref 27–34)
MCHC RBC-ENTMCNC: 33.2 GM/DL — SIGNIFICANT CHANGE UP (ref 32–36)
MCV RBC AUTO: 87.7 FL — SIGNIFICANT CHANGE UP (ref 80–100)
NRBC # BLD: 0 /100 WBCS — SIGNIFICANT CHANGE UP (ref 0–0)
PHOSPHATE SERPL-MCNC: 3.6 MG/DL — SIGNIFICANT CHANGE UP (ref 2.5–4.5)
PLATELET # BLD AUTO: 268 K/UL — SIGNIFICANT CHANGE UP (ref 150–400)
POTASSIUM SERPL-MCNC: 4.3 MMOL/L — SIGNIFICANT CHANGE UP (ref 3.5–5.3)
POTASSIUM SERPL-MCNC: 4.3 MMOL/L — SIGNIFICANT CHANGE UP (ref 3.5–5.3)
POTASSIUM SERPL-SCNC: 4.3 MMOL/L — SIGNIFICANT CHANGE UP (ref 3.5–5.3)
POTASSIUM SERPL-SCNC: 4.3 MMOL/L — SIGNIFICANT CHANGE UP (ref 3.5–5.3)
PROT SERPL-MCNC: 6.5 G/DL — SIGNIFICANT CHANGE UP (ref 6–8.3)
RBC # BLD: 3.81 M/UL — SIGNIFICANT CHANGE UP (ref 3.8–5.2)
RBC # FLD: 14.1 % — SIGNIFICANT CHANGE UP (ref 10.3–14.5)
SODIUM SERPL-SCNC: 135 MMOL/L — SIGNIFICANT CHANGE UP (ref 135–145)
WBC # BLD: 8.41 K/UL — SIGNIFICANT CHANGE UP (ref 3.8–10.5)
WBC # FLD AUTO: 8.41 K/UL — SIGNIFICANT CHANGE UP (ref 3.8–10.5)

## 2022-07-21 PROCEDURE — 99233 SBSQ HOSP IP/OBS HIGH 50: CPT

## 2022-07-21 RX ORDER — SODIUM CHLORIDE 9 MG/ML
2 INJECTION INTRAMUSCULAR; INTRAVENOUS; SUBCUTANEOUS
Qty: 120 | Refills: 0
Start: 2022-07-21 | End: 2022-08-19

## 2022-07-21 RX ORDER — PANTOPRAZOLE SODIUM 20 MG/1
1 TABLET, DELAYED RELEASE ORAL
Qty: 10 | Refills: 0
Start: 2022-07-21 | End: 2022-07-30

## 2022-07-21 RX ORDER — CLONAZEPAM 1 MG
1 TABLET ORAL
Qty: 0 | Refills: 0 | DISCHARGE
Start: 2022-07-21

## 2022-07-21 RX ORDER — SERTRALINE 25 MG/1
0 TABLET, FILM COATED ORAL
Qty: 0 | Refills: 0 | DISCHARGE

## 2022-07-21 RX ORDER — SERTRALINE 25 MG/1
1 TABLET, FILM COATED ORAL
Qty: 0 | Refills: 0 | DISCHARGE
Start: 2022-07-21

## 2022-07-21 RX ORDER — OXCARBAZEPINE 300 MG/1
1 TABLET, FILM COATED ORAL
Qty: 0 | Refills: 0 | DISCHARGE
Start: 2022-07-21

## 2022-07-21 RX ORDER — AMITRIPTYLINE HCL 25 MG
1 TABLET ORAL
Qty: 0 | Refills: 0 | DISCHARGE
Start: 2022-07-21

## 2022-07-21 RX ORDER — CLONAZEPAM 1 MG
0 TABLET ORAL
Qty: 0 | Refills: 0 | DISCHARGE

## 2022-07-21 RX ORDER — OXCARBAZEPINE 300 MG/1
0 TABLET, FILM COATED ORAL
Qty: 0 | Refills: 0 | DISCHARGE

## 2022-07-21 RX ORDER — AMITRIPTYLINE HCL 25 MG
0 TABLET ORAL
Qty: 0 | Refills: 0 | DISCHARGE

## 2022-07-21 RX ADMIN — ENOXAPARIN SODIUM 40 MILLIGRAM(S): 100 INJECTION SUBCUTANEOUS at 06:10

## 2022-07-21 RX ADMIN — POLYETHYLENE GLYCOL 3350 17 GRAM(S): 17 POWDER, FOR SOLUTION ORAL at 12:28

## 2022-07-21 RX ADMIN — Medication 50 MILLIGRAM(S): at 12:29

## 2022-07-21 RX ADMIN — PANTOPRAZOLE SODIUM 40 MILLIGRAM(S): 20 TABLET, DELAYED RELEASE ORAL at 06:11

## 2022-07-21 RX ADMIN — SODIUM CHLORIDE 2 GRAM(S): 9 INJECTION INTRAMUSCULAR; INTRAVENOUS; SUBCUTANEOUS at 06:10

## 2022-07-21 RX ADMIN — OXCARBAZEPINE 600 MILLIGRAM(S): 300 TABLET, FILM COATED ORAL at 06:10

## 2022-07-21 RX ADMIN — Medication 200 MILLIGRAM(S): at 06:09

## 2022-07-21 RX ADMIN — SERTRALINE 100 MILLIGRAM(S): 25 TABLET, FILM COATED ORAL at 12:28

## 2022-07-21 NOTE — PROGRESS NOTE ADULT - ASSESSMENT
62 year old female with atypical trigeminal neuralgia,HTN  coming in for syncope,dizziness,weakness and hyponatremia.  1.Hyponatremia-resolved.  2.Neurology f/u.  3.Trigeminal neuralgia-OXcarbazepine.  4.GI and DVT prophylaxis.

## 2022-07-21 NOTE — PROGRESS NOTE ADULT - SUBJECTIVE AND OBJECTIVE BOX
NEUROLOGY FOLLOW-UP NOTE    NAME:  MARBELLA CALDWELL      ASSESSMENT:  62 RHF with likely syncopal event, also with flare-up of trigeminal neuralgia, but without neurodiagnostic evidence for seizure or stroke      RECOMMENDATIONS:    - Methylprednisolone 500mg IV BID has been administered for 4 doses to treat current flare-up of trigeminal neuralgia       - Can maintain patient on oral Methylprednisolone tapering course over 6 days       - Maintain GI prophylaxis while patient is receiving steroids    - Monitor orthostatic BP & HR with each vital signs check    - Plentiful fluid hydration (2 liters, or 8 glasses, of water daily) encouraged    - Patient counseled to maintain caution with rapid changes in position    - Continue PT/OT to help with recovery of gait and balance    - DVT ppx: SCDs, Enoxaparin    - Routine follow-up with patient's established neurologist for management of trigeminal neuralgia            NOTE TO BE COMPLETED - PLEASE REFER TO ABOVE ONLY AND IGNORE INFORMATION BELOW    ******************************    HPI:  This is a 62 year old female with atypical trigeminal neuralgia coming in for syncope. Pt states that earlier today she went to the bathroom and was straining on the toilet and started to get lightheaded. She states she felt light headed and tried to sit on the bed and passed out. Her  who was at home states he heard a bang while he was in the kitchen. Went to check in on her and found her on the ground. Pt does not recall the event and woke up on the bed. She denies any headaches, visual disturbances, N/V/D,  falls, chest pain, palpitations, lower extremity swelling, fevers, skin rash, recent travel, or sick contacts   (17 Jul 2022 13:24)      NEURO HPI:      INTERVAL HISTORY:      MEDICATIONS:  acetaminophen     Tablet .. 650 milliGRAM(s) Oral every 6 hours PRN  aluminum hydroxide/magnesium hydroxide/simethicone Suspension 30 milliLiter(s) Oral every 4 hours PRN  amitriptyline 50 milliGRAM(s) Oral daily  clonazePAM  Tablet 0.5 milliGRAM(s) Oral at bedtime  enoxaparin Injectable 40 milliGRAM(s) SubCutaneous every 24 hours  melatonin 3 milliGRAM(s) Oral at bedtime PRN  ondansetron Injectable 4 milliGRAM(s) IV Push every 8 hours PRN  OXcarbazepine 600 milliGRAM(s) Oral two times a day  pantoprazole    Tablet 40 milliGRAM(s) Oral before breakfast  polyethylene glycol 3350 17 Gram(s) Oral daily  senna 2 Tablet(s) Oral at bedtime  sertraline 100 milliGRAM(s) Oral daily  sodium chloride 2 Gram(s) Oral two times a day      ALLERGIES:  No Known Allergies      REVIEW OF SYSTEMS:  Fourteen systems reviewed and negative except as in HPI / Interval History.        OBJECTIVE:  Vital Signs Last 24 Hrs  T(C): 36.7 (21 Jul 2022 11:03), Max: 36.7 (20 Jul 2022 15:50)  T(F): 98.1 (21 Jul 2022 11:03), Max: 98.1 (21 Jul 2022 11:03)  HR: 77 (21 Jul 2022 11:03) (70 - 77)  BP: 111/72 (21 Jul 2022 11:03) (108/66 - 130/77)  BP(mean): --  RR: 18 (21 Jul 2022 11:03) (18 - 18)  SpO2: 98% (21 Jul 2022 11:03) (96% - 99%)    Parameters below as of 21 Jul 2022 11:03  Patient On (Oxygen Delivery Method): room air        General Examination:  General: No acute distress  HEENT: Atraumatic, Normocephalic  Respiratory: CTA B/l.  No crackles, rhonchi, or wheezes.  Cardiovascular: RRR.  Normal S1 & S2.  Normal b/l radial and pedal pulses.    Neurological Examination:  General / Mental Status: AAO x 3.  No aphasia or dysarthria.  Naming and repetition intact.  Cranial Nerves: VFF x 4.  PERRL.  EOMI x 2, No nystagmus or diplopia.  B/l V1-V3 equal and intact to light touch and pinprick.  Symmetric facial movement and palate elevation.  B/l hearing equal to finger rub.  5/5 strength with b/l sternocleidomastoid & trapezius.  Midline tongue protrusion, with no atrophy or fasciculations.  Motor: Normal bulk & tone in all four extremities.  5/5 strength throughout all four extremities.  No downward drift, rigidity, spasticity, or tremors in any of the four extremities.  Sensory: Intact to light touch and pinprick in all four extremities.  Negative Romberg.  Reflex: 2+ and symmetric at b/l biceps, triceps, brachioradialis, patellae, and ankles.  Downgoing toes b/l.  Coordination: No dysmetria with b/l finger-to-nose and heel raise tests.  Symmetric rapid alternating movements b/l.  Gait: Normal, narrow-based gait.  No difficulty with tiptoe, heel, and tandem gaits.        LABORATORY VALUES:                          11.1   8.41  )-----------( 268      ( 21 Jul 2022 05:04 )             33.4       07-21    135  |  103  |  18  ----------------------------<  110<H>  4.3   |  24  |  0.70    Ca    9.2      21 Jul 2022 05:04  Phos  3.6     07-21  Mg     2.4     07-21    TPro  6.5  /  Alb  3.3<L>  /  TBili  0.2  /  DBili  x   /  AST  16  /  ALT  36  /  AlkPhos  119  07-21      Glucose Trend  07-21-22 @ 05:04   -  -- 110<H> --  07-20-22 @ 07:10   -  -- 133<H> --  07-19-22 @ 14:03   -  -- 114<H> --  07-19-22 @ 05:23   -  -- 84 --                    NEUROIMAGING:          Please contact the Neurology consult service with any neurological questions.      Kalia Esparza MD   of Neurology  [facility]         NEUROLOGY FOLLOW-UP NOTE    NAME:  MARBELLA CALDWELL      ASSESSMENT:  62 RHF with likely syncopal event in the setting of hyponatremia, also with flare-up of trigeminal neuralgia, but without neurodiagnostic evidence for seizure or stroke      RECOMMENDATIONS:    - Methylprednisolone 500mg IV BID has been administered for 4 doses to treat current flare-up of trigeminal neuralgia       - Can maintain patient on oral Methylprednisolone tapering course over 6 days       - Maintain GI prophylaxis while patient is receiving steroids    - Monitor orthostatic BP & HR with each vital signs check    - Plentiful fluid hydration (2 liters, or 8 glasses, of water daily) encouraged    - Patient counseled to maintain caution with rapid changes in position    - Management of hyponatremia as per primary team    - Continue PT/OT to help with recovery of gait and balance    - DVT ppx: SCDs, Enoxaparin    - Routine follow-up with patient's established neurologist for management of trigeminal neuralgia          ******************************    HPI:  This is a 62 year old female with atypical trigeminal neuralgia coming in for syncope. Pt states that earlier today she went to the bathroom and was straining on the toilet and started to get lightheaded. She states she felt lightheaded and tried to sit on the bed and passed out. Her  who was at home states he heard a bang while he was in the kitchen. Went to check in on her and found her on the ground. Pt does not recall the event and woke up on the bed. She denies any headaches, visual disturbances, N/V/D,  falls, chest pain, palpitations, lower extremity swelling, fevers, skin rash, recent travel, or sick contacts. (17 Jul 2022 13:24)      NEURO HPI:  62 RHF with trigeminal neuralgia, on Oxcarbazepine 600mg PO BID, who presented after having an episode of lightheadedness followed by loss of consciousness for an uncertain period of time.      INTERVAL HISTORY:  The patient reports having a flare-up of pain at both sides of her face, consistent with her history of trigeminal neuralgia. She has not had any new episodes of lightheadedness or loss of consciousness. She has been found to have hyponatremia, and she is currently taking sodium chloride tablets.      MEDICATIONS:  acetaminophen     Tablet .. 650 milliGRAM(s) Oral every 6 hours PRN  aluminum hydroxide/magnesium hydroxide/simethicone Suspension 30 milliLiter(s) Oral every 4 hours PRN  amitriptyline 50 milliGRAM(s) Oral daily  clonazePAM  Tablet 0.5 milliGRAM(s) Oral at bedtime  enoxaparin Injectable 40 milliGRAM(s) SubCutaneous every 24 hours  melatonin 3 milliGRAM(s) Oral at bedtime PRN  ondansetron Injectable 4 milliGRAM(s) IV Push every 8 hours PRN  OXcarbazepine 600 milliGRAM(s) Oral two times a day  pantoprazole    Tablet 40 milliGRAM(s) Oral before breakfast  polyethylene glycol 3350 17 Gram(s) Oral daily  senna 2 Tablet(s) Oral at bedtime  sertraline 100 milliGRAM(s) Oral daily  sodium chloride 2 Gram(s) Oral two times a day      ALLERGIES:  No Known Allergies      REVIEW OF SYSTEMS:  Fourteen systems reviewed and negative except as in HPI / Interval History.        OBJECTIVE:  Vital Signs Last 24 Hrs  T(C): 36.7 (21 Jul 2022 11:03), Max: 36.7 (20 Jul 2022 15:50)  T(F): 98.1 (21 Jul 2022 11:03), Max: 98.1 (21 Jul 2022 11:03)  HR: 77 (21 Jul 2022 11:03) (70 - 77)  BP: 111/72 (21 Jul 2022 11:03) (108/66 - 130/77)  RR: 18 (21 Jul 2022 11:03) (18 - 18)  SpO2: 98% (21 Jul 2022 11:03) (96% - 99%)  Parameters below as of 21 Jul 2022 11:03  Patient On (Oxygen Delivery Method): room air      General Examination:  General: No acute distress  HEENT: Atraumatic, Normocephalic  Respiratory: CTA B/l.  No crackles, rhonchi, or wheezes.  Cardiovascular: RRR.  Normal S1 & S2.  Normal b/l radial and pedal pulses.    Neurological Examination:  General / Mental Status: AAO x 3.  No aphasia or dysarthria.  Naming and repetition intact.  Cranial Nerves: VFF x 4.  PERRL.  EOMI x 2, No nystagmus or diplopia.  B/l V1-V3 equal and intact to light touch and pinprick.  Symmetric facial movement and palate elevation.  B/l hearing equal to finger rub.  Negative Frances-Hallpike maneuver b/l.  5/5 strength with b/l sternocleidomastoid & trapezius.  Midline tongue protrusion, with no atrophy or fasciculations.  Motor: Normal bulk & tone in all four extremities.  5/5 strength throughout all four extremities.  No downward drift, rigidity, spasticity, or tremors in any of the four extremities.  Sensory: Intact to light touch and pinprick in all four extremities.  Negative Romberg.  Reflex: 2+ and symmetric at b/l biceps, triceps, brachioradialis, patellae, and ankles.  Downgoing toes b/l.  Coordination: No dysmetria with b/l finger-to-nose and heel raise tests.  Symmetric rapid alternating movements b/l.  Gait: Normal, narrow-based gait.  No difficulty with tiptoe, heel, and tandem gaits.        LABORATORY VALUES:                          11.1   8.41  )-----------( 268      ( 21 Jul 2022 05:04 )             33.4       07-21    135  |  103  |  18  ----------------------------<  110<H>  4.3   |  24  |  0.70    Ca    9.2      21 Jul 2022 05:04  Phos  3.6     07-21  Mg     2.4     07-21    TPro  6.5  /  Alb  3.3<L>  /  TBili  0.2  /  DBili  x   /  AST  16  /  ALT  36  /  AlkPhos  119  07-21      Glucose Trend  07-21-22 @ 05:04   -  -- 110<H> --  07-20-22 @ 07:10   -  -- 133<H> --  07-19-22 @ 14:03   -  -- 114<H> --  07-19-22 @ 05:23   -  -- 84 --    A1C with Estimated Average Glucose (07.18.22 @ 00:13)   A1C with Estimated Average Glucose Result: 5.3%   Estimated Average Glucose: 105 mg/dL     Thyroid Stimulating Hormone, Serum (07.18.22 @ 06:38)   Thyroid Stimulating Hormone, Serum: 2.30 uU/mL         NEUROIMAGING:      CT Head (7/17/22):  - No acute intracranial abnormality            Please contact the Neurology consult service with any neurological questions.      Kalia Esparza MD   of Neurology  [facility] at [facility]         NEUROLOGY FOLLOW-UP NOTE    NAME:  MARBELLA CALDWELL      ASSESSMENT:  62 RHF with likely syncopal event in the setting of hyponatremia, also with flare-up of trigeminal neuralgia, but without neurodiagnostic evidence for seizure or stroke      RECOMMENDATIONS:    - Methylprednisolone 500mg IV BID has been administered for 4 doses to treat current flare-up of trigeminal neuralgia       - Can maintain patient on oral Methylprednisolone tapering course over 6 days       - Maintain GI prophylaxis while patient is receiving steroids    - Monitor orthostatic BP & HR with each vital signs check    - Plentiful fluid hydration (2 liters, or 8 glasses, of water daily) encouraged    - Patient counseled to maintain caution with rapid changes in position    - Management of hyponatremia as per primary team    - Continue PT/OT to help with recovery of gait and balance    - DVT ppx: SCDs, Enoxaparin    - Routine follow-up with patient's established neurologist for management of trigeminal neuralgia          ******************************    HPI:  This is a 62 year old female with atypical trigeminal neuralgia coming in for syncope. Pt states that earlier today she went to the bathroom and was straining on the toilet and started to get lightheaded. She states she felt lightheaded and tried to sit on the bed and passed out. Her  who was at home states he heard a bang while he was in the kitchen. Went to check in on her and found her on the ground. Pt does not recall the event and woke up on the bed. She denies any headaches, visual disturbances, N/V/D,  falls, chest pain, palpitations, lower extremity swelling, fevers, skin rash, recent travel, or sick contacts. (17 Jul 2022 13:24)      NEURO HPI:  62 RHF with trigeminal neuralgia, on Oxcarbazepine 600mg PO BID, who presented after having an episode of lightheadedness followed by loss of consciousness for an uncertain period of time.      INTERVAL HISTORY:  The patient reports having a flare-up of pain at both sides of her face, consistent with her history of trigeminal neuralgia. She has not had any new episodes of lightheadedness or loss of consciousness. She has been found to have hyponatremia, and she is currently taking sodium chloride tablets.      MEDICATIONS:  acetaminophen     Tablet .. 650 milliGRAM(s) Oral every 6 hours PRN  aluminum hydroxide/magnesium hydroxide/simethicone Suspension 30 milliLiter(s) Oral every 4 hours PRN  amitriptyline 50 milliGRAM(s) Oral daily  clonazePAM  Tablet 0.5 milliGRAM(s) Oral at bedtime  enoxaparin Injectable 40 milliGRAM(s) SubCutaneous every 24 hours  melatonin 3 milliGRAM(s) Oral at bedtime PRN  ondansetron Injectable 4 milliGRAM(s) IV Push every 8 hours PRN  OXcarbazepine 600 milliGRAM(s) Oral two times a day  pantoprazole    Tablet 40 milliGRAM(s) Oral before breakfast  polyethylene glycol 3350 17 Gram(s) Oral daily  senna 2 Tablet(s) Oral at bedtime  sertraline 100 milliGRAM(s) Oral daily  sodium chloride 2 Gram(s) Oral two times a day      ALLERGIES:  No Known Allergies      REVIEW OF SYSTEMS:  Fourteen systems reviewed and negative except as in HPI / Interval History.        OBJECTIVE:  Vital Signs Last 24 Hrs  T(C): 36.7 (21 Jul 2022 11:03), Max: 36.7 (20 Jul 2022 15:50)  T(F): 98.1 (21 Jul 2022 11:03), Max: 98.1 (21 Jul 2022 11:03)  HR: 77 (21 Jul 2022 11:03) (70 - 77)  BP: 111/72 (21 Jul 2022 11:03) (108/66 - 130/77)  RR: 18 (21 Jul 2022 11:03) (18 - 18)  SpO2: 98% (21 Jul 2022 11:03) (96% - 99%)  Parameters below as of 21 Jul 2022 11:03  Patient On (Oxygen Delivery Method): room air      General Examination:  General: No acute distress  HEENT: Atraumatic, Normocephalic  Respiratory: CTA B/l.  No crackles, rhonchi, or wheezes.  Cardiovascular: RRR.  Normal S1 & S2.  Normal b/l radial and pedal pulses.    Neurological Examination:  General / Mental Status: AAO x 3.  No aphasia or dysarthria.  Naming and repetition intact.  Cranial Nerves: VFF x 4.  PERRL.  EOMI x 2, No nystagmus or diplopia.  B/l V1-V3 equal and intact to light touch and pinprick.  Symmetric facial movement and palate elevation.  B/l hearing equal to finger rub.  Negative Frances-Hallpike maneuver b/l.  5/5 strength with b/l sternocleidomastoid & trapezius.  Midline tongue protrusion, with no atrophy or fasciculations.  Motor: Normal bulk & tone in all four extremities.  5/5 strength throughout all four extremities.  No downward drift, rigidity, spasticity, or tremors in any of the four extremities.  Sensory: Intact to light touch and pinprick in all four extremities.  Negative Romberg.  Reflex: 2+ and symmetric at b/l biceps, triceps, brachioradialis, patellae, and ankles.  Downgoing toes b/l.  Coordination: No dysmetria with b/l finger-to-nose and heel raise tests.  Symmetric rapid alternating movements b/l.  Gait: Normal, narrow-based gait.  No difficulty with tiptoe, heel, and tandem gaits.        LABORATORY VALUES:                          11.1   8.41  )-----------( 268      ( 21 Jul 2022 05:04 )             33.4       07-21    135  |  103  |  18  ----------------------------<  110<H>  4.3   |  24  |  0.70    Ca    9.2      21 Jul 2022 05:04  Phos  3.6     07-21  Mg     2.4     07-21    TPro  6.5  /  Alb  3.3<L>  /  TBili  0.2  /  DBili  x   /  AST  16  /  ALT  36  /  AlkPhos  119  07-21      Glucose Trend  07-21-22 @ 05:04   -  -- 110<H> --  07-20-22 @ 07:10   -  -- 133<H> --  07-19-22 @ 14:03   -  -- 114<H> --  07-19-22 @ 05:23   -  -- 84 --    A1C with Estimated Average Glucose (07.18.22 @ 00:13)   A1C with Estimated Average Glucose Result: 5.3%   Estimated Average Glucose: 105 mg/dL     Thyroid Stimulating Hormone, Serum (07.18.22 @ 06:38)   Thyroid Stimulating Hormone, Serum: 2.30 uU/mL           NEUROIMAGING:      CT Head (7/17/22):  - No acute intracranial abnormality            Please contact the Neurology consult service with any neurological questions.      Kalia Esparza MD   of Neurology  [facility] at [facility]

## 2022-07-21 NOTE — PROGRESS NOTE ADULT - SUBJECTIVE AND OBJECTIVE BOX
[facility] NEPHROLOGY- PROGRESS NOTE    Patient is a 63yo Female with atypical trigeminal neuralgia on carbamazepine admitted with syncope and found to have serum Na 126. Nephrology consulted for hyponatremia.     Hospital Medications: Medications reviewed.  REVIEW OF SYSTEMS:  CONSTITUTIONAL: No fevers or chills  RESPIRATORY: No shortness of breath  CARDIOVASCULAR: No chest pain.  GASTROINTESTINAL: No nausea, vomiting, diarrhea or abdominal pain.   VASCULAR: No bilateral lower extremity edema.     VITALS:  T(F): 98.1 (07-21-22 @ 11:03), Max: 98.1 (07-21-22 @ 11:03)  HR: 77 (07-21-22 @ 11:03)  BP: 111/72 (07-21-22 @ 11:03)  RR: 18 (07-21-22 @ 11:03)  SpO2: 98% (07-21-22 @ 11:03)  Wt(kg): --    07-20 @ 07:01  -  07-21 @ 07:00  --------------------------------------------------------  IN: 659 mL / OUT: 0 mL / NET: 659 mL    07-21 @ 07:01  -  07-21 @ 13:38  --------------------------------------------------------  IN: 430 mL / OUT: 0 mL / NET: 430 mL      PHYSICAL EXAM:  Constitutional: NAD  Neurological: Awake and Alert  HEENT: anicteric sclera,   Respiratory: CTAB, no wheezes, rales or rhonchi  Cardiovascular: S1, S2, RRR  Gastrointestinal: BS+, soft, NT/ND  : No ledesma.  Extremities: No peripheral edema    LABS:  07-21  135 <--, 131 <--, 129 <--, 129 <--, 131 <--, 126 <--  135  |  103  |  18  ----------------------------<  110<H>  4.3   |  24  |  0.70    Ca    9.2      21 Jul 2022 05:04  Phos  3.6     07-21  Mg     2.4     07-21    TPro  6.5  /  Alb  3.3<L>  /  TBili  0.2  /  DBili      /  AST  16  /  ALT  36  /  AlkPhos  119  07-21    Creatinine Trend: 0.70 <--, 0.72 <--, 0.75 <--, 0.61 <--, 0.75 <--, 0.78 <--                        11.1   8.41  )-----------( 268      ( 21 Jul 2022 05:04 )             33.4     Urine Studies:    Osmolality, Random Urine: 534 mos/kg (07-19 @ 14:11)  Sodium, Random Urine: 95 mmol/L (07-19 @ 14:11)  Sodium, Random Urine: 55 mmol/L (07-18 @ 17:08)

## 2022-07-21 NOTE — PROGRESS NOTE ADULT - ASSESSMENT
This is a 62 year old female with atypical trigeminal neuralgia coming in for syncope. Pt states that earlier today she went to the bathroom and was straining on the toilet and started to get lightheaded. She states she felt light headed and tried to sit on the bed and passed out.  Endocrinology is consulted for orthostatic hypotension , hyponatremia and weight loss of 25-30 pounds in a few months        # r/o adrenal and thyroid abnormalities  - cortisol and TSH normal   - T4 normal     #Hyponatremia- improving on nacl tabs  Urine lytes consistent with SIADH   continue with salt tablets as per nephrolohgy ,    will sign off , reconsult as needed    This is a 62 year old female with atypical trigeminal neuralgia coming in for syncope. Pt states that earlier today she went to the bathroom and was straining on the toilet and started to get lightheaded. She states she felt light headed and tried to sit on the bed and passed out.  Endocrinology is consulted for orthostatic hypotension , hyponatremia and weight loss of 25-30 pounds in a few months        # r/o adrenal and thyroid abnormalities  - cortisol and TSH normal   - T4 normal     #Hyponatremia- improving on nacl tabs  Urine lytes consistent with SIADH   continue with salt tablets as per nephrolohgy ,    will sign off , reconsult as needed.

## 2022-07-21 NOTE — PROGRESS NOTE ADULT - ASSESSMENT
Patient is a 61yo Female with atypical trigeminal neuralgia on carbamazepine admitted with syncope and found to have serum Na 126. Nephrology consulted for hyponatremia.       1. Hyponatremia- urine studies consistent with SIADH. Hyponatremia resolved on NaCl 2g PO bid. c/w 1L/day fluid restriction. TSH wnl and am cortisol wnl.   Please keep off IVF. oxcarbazepine/ zoloft and amitripytiline,can also cause SIADH, will continue for now.   Monitor serum sodium.    2. Syncope- w/u as per primary team    3. Trigeminal neuralgia- now on oxcarbazepine/ zoloft and amitripytiline, which an also cause SIADH; will monitor for now.     Ok from renal standpoint for d/c home on NaCl 1g PO tid. Pt should f/u with me for repeat sodium level next week. Plan discussed with patient and primary team      [facility] NEPHROLOGY  Dwaine Gold M.D.  Christiano Maza D.O.  Sanam Parisi M.D.  Ana Burgess, MSN, ANP-C  [phone]    [address] [place] [address]

## 2022-07-21 NOTE — DISCHARGE NOTE NURSING/CASE MANAGEMENT/SOCIAL WORK - PATIENT PORTAL LINK FT
You can access the FollowMyHealth Patient Portal offered by [facility] by registering at the following website: [email or web address]. By joining [employer]’s FollowMyHealth portal, you will also be able to view your health information using other applications (apps) compatible with our system.

## 2022-07-21 NOTE — DISCHARGE NOTE NURSING/CASE MANAGEMENT/SOCIAL WORK - NSDCPEFALRISK_GEN_ALL_CORE
For information on Fall & Injury Prevention, visit: [email or web address] OR  [email or web address] OR  https://www.cdc.gov/steadi/patient.html

## 2022-07-21 NOTE — PROGRESS NOTE ADULT - PROVIDER SPECIALTY LIST ADULT
Cardiology
Nephrology
Neurology
Internal Medicine
Nephrology
Cardiology
Cardiology
Endocrinology
Nephrology
Neurology
Endocrinology
Internal Medicine

## 2022-07-21 NOTE — PROGRESS NOTE ADULT - REASON FOR ADMISSION
syncope

## 2022-07-21 NOTE — PROGRESS NOTE ADULT - SUBJECTIVE AND OBJECTIVE BOX
CHIEF COMPLAINT:Patient is a 62y old  Female who presents with a chief complaint of syncope .Pt appears comfortable.    	  REVIEW OF SYSTEMS:  CONSTITUTIONAL: No fever, weight loss, or fatigue  EYES: No eye pain, visual disturbances, or discharge  ENT:  No difficulty hearing, tinnitus, vertigo; No sinus or throat pain  NECK: No pain or stiffness  RESPIRATORY: No cough, wheezing, chills or hemoptysis; No Shortness of Breath  CARDIOVASCULAR: No chest pain, palpitations, passing out, dizziness, or leg swelling  GASTROINTESTINAL: No abdominal or epigastric pain. No nausea, vomiting, or hematemesis; No diarrhea or constipation. No melena or hematochezia.  GENITOURINARY: No dysuria, frequency, hematuria, or incontinence  NEUROLOGICAL: No headaches, memory loss, loss of strength, numbness, or tremors  SKIN: No itching, burning, rashes, or lesions   LYMPH Nodes: No enlarged glands  ENDOCRINE: No heat or cold intolerance; No hair loss  MUSCULOSKELETAL: No joint pain or swelling; No muscle, back, or extremity pain  PSYCHIATRIC: No depression, anxiety, mood swings, or difficulty sleeping  HEME/LYMPH: No easy bruising, or bleeding gums  ALLERGY AND IMMUNOLOGIC: No hives or eczema	        PHYSICAL EXAM:  T(C): 36.6 (07-21-22 @ 07:18), Max: 36.7 (07-20-22 @ 15:50)  HR: 70 (07-21-22 @ 07:18) (70 - 81)  BP: 130/77 (07-21-22 @ 07:18) (100/68 - 130/77)  RR: 18 (07-21-22 @ 07:18) (18 - 18)  SpO2: 98% (07-21-22 @ 07:18) (96% - 99%)  Wt(kg): --  I&O's Summary    20 Jul 2022 07:01  -  21 Jul 2022 07:00  --------------------------------------------------------  IN: 659 mL / OUT: 0 mL / NET: 659 mL        Appearance: Normal	  HEENT:   Normal oral mucosa, PERRL, EOMI	  Lymphatic: No lymphadenopathy  Cardiovascular: Normal S1 S2, No JVD, No murmurs, No edema  Respiratory: Lungs clear to auscultation	  Psychiatry: A & O x 3, Mood & affect appropriate  Gastrointestinal:  Soft, Non-tender, + BS	  Skin: No rashes, No ecchymoses, No cyanosis	  Neurologic: Non-focal  Extremities: Normal range of motion, No clubbing, cyanosis or edema  Vascular: Peripheral pulses palpable 2+ bilaterally    MEDICATIONS  (STANDING):  amitriptyline 50 milliGRAM(s) Oral daily  clonazePAM  Tablet 0.5 milliGRAM(s) Oral at bedtime  enoxaparin Injectable 40 milliGRAM(s) SubCutaneous every 24 hours  OXcarbazepine 600 milliGRAM(s) Oral two times a day  pantoprazole    Tablet 40 milliGRAM(s) Oral before breakfast  polyethylene glycol 3350 17 Gram(s) Oral daily  senna 2 Tablet(s) Oral at bedtime  sertraline 100 milliGRAM(s) Oral daily  sodium chloride 2 Gram(s) Oral two times a day      	  LABS:	 	                       11.1   8.41  )-----------( 268      ( 21 Jul 2022 05:04 )             33.4     07-21    135  |  103  |  18  ----------------------------<  110<H>  4.3   |  24  |  0.70    Ca    9.2      21 Jul 2022 05:04  Phos  3.6     07-21  Mg     2.4     07-21    TPro  6.5  /  Alb  3.3<L>  /  TBili  0.2  /  DBili  x   /  AST  16  /  ALT  36  /  AlkPhos  119  07-21    TSH: Thyroid Stimulating Hormone, Serum: 2.30 uU/mL (07-18 @ 06:38)

## 2022-07-21 NOTE — PROGRESS NOTE ADULT - TIME BILLING
I counseled the patient and primary team about the patient's neurodiagnostic test findings, and the treatment adjustments indicated for management of her symptoms.
I counseled the patient about the appropriate medications to take to help manage her symptoms.

## 2022-07-21 NOTE — PROGRESS NOTE ADULT - SUBJECTIVE AND OBJECTIVE BOX
INTERVAL HPI/OVERNIGHT EVENTS:   Patients examined bedside , she is comfortable , NAD, her Na improving with salt tablets .     REVIEW OF SYSTEMS:  CONSTITUTIONAL: No fever, weight loss, or fatigue  RESPIRATORY: No cough, wheezing, chills or hemoptysis; No shortness of breath  CARDIOVASCULAR: No chest pain, palpitations, dizziness, or leg swelling  GASTROINTESTINAL: No abdominal pain. No nausea, vomiting, or hematemesis; No diarrhea or constipation. No melena or hematochezia.  GENITOURINARY: No dysuria or hematuria, urinary frequency  NEUROLOGICAL: No headaches, memory loss, loss of strength, numbness, or tremors  SKIN: No itching, burning, rashes, or lesions     MEDICATIONS  (STANDING):  amitriptyline 50 milliGRAM(s) Oral daily  clonazePAM  Tablet 0.5 milliGRAM(s) Oral at bedtime  enoxaparin Injectable 40 milliGRAM(s) SubCutaneous every 24 hours  OXcarbazepine 600 milliGRAM(s) Oral two times a day  pantoprazole    Tablet 40 milliGRAM(s) Oral before breakfast  polyethylene glycol 3350 17 Gram(s) Oral daily  senna 2 Tablet(s) Oral at bedtime  sertraline 100 milliGRAM(s) Oral daily  sodium chloride 2 Gram(s) Oral two times a day    MEDICATIONS  (PRN):  acetaminophen     Tablet .. 650 milliGRAM(s) Oral every 6 hours PRN Temp greater or equal to 38C (100.4F), Mild Pain (1 - 3)  aluminum hydroxide/magnesium hydroxide/simethicone Suspension 30 milliLiter(s) Oral every 4 hours PRN Dyspepsia  melatonin 3 milliGRAM(s) Oral at bedtime PRN Insomnia  ondansetron Injectable 4 milliGRAM(s) IV Push every 8 hours PRN Nausea and/or Vomiting      Vital Signs Last 24 Hrs  T(C): 36.6 (21 Jul 2022 07:18), Max: 36.7 (20 Jul 2022 15:50)  T(F): 97.9 (21 Jul 2022 07:18), Max: 98 (20 Jul 2022 15:50)  HR: 70 (21 Jul 2022 07:18) (70 - 81)  BP: 130/77 (21 Jul 2022 07:18) (100/68 - 130/77)  BP(mean): --  RR: 18 (21 Jul 2022 07:18) (18 - 18)  SpO2: 98% (21 Jul 2022 07:18) (96% - 99%)    Parameters below as of 21 Jul 2022 07:18  Patient On (Oxygen Delivery Method): room air        PHYSICAL EXAMINATION:  GENERAL: NAD, well built  HEAD:  Atraumatic, Normocephalic  EYES:  conjunctiva and sclera clear  NECK: Supple, No JVD, Normal thyroid  CHEST/LUNG: Clear to auscultation. Clear to percussion bilaterally; No rales, rhonchi, wheezing, or rubs  HEART: Regular rate and rhythm; No murmurs, rubs, or gallops  ABDOMEN: Soft, Nontender, Nondistended; Bowel sounds present  NERVOUS SYSTEM:  Alert & Oriented X3,    EXTREMITIES:  2+ Peripheral Pulses, No clubbing, cyanosis, or edema  SKIN: warm dry                          11.1   8.41  )-----------( 268      ( 21 Jul 2022 05:04 )             33.4     07-21    135  |  103  |  18  ----------------------------<  110<H>  4.3   |  24  |  0.70    Ca    9.2      21 Jul 2022 05:04  Phos  3.6     07-21  Mg     2.4     07-21    TPro  6.5  /  Alb  3.3<L>  /  TBili  0.2  /  DBili  x   /  AST  16  /  ALT  36  /  AlkPhos  119  07-21    LIVER FUNCTIONS - ( 21 Jul 2022 05:04 )  Alb: 3.3 g/dL / Pro: 6.5 g/dL / ALK PHOS: 119 U/L / ALT: 36 U/L DA / AST: 16 U/L / GGT: x                       CAPILLARY BLOOD GLUCOSE  CAPILLARY BLOOD GLUCOSE        CAPILLARY BLOOD GLUCOSE          RADIOLOGY & ADDITIONAL TESTS:

## 2022-07-23 LAB — RENIN PLAS-CCNC: 0.4 NG/ML/HR — SIGNIFICANT CHANGE UP (ref 0.17–5.38)

## 2022-07-24 LAB
CORTICOSTEROID BINDING GLOBULIN RESULT: 2.1 MG/DL — SIGNIFICANT CHANGE UP
CORTIS F/TOTAL MFR SERPL: 4.8 % — SIGNIFICANT CHANGE UP
CORTIS SERPL-MCNC: 7.6 UG/DL — SIGNIFICANT CHANGE UP
CORTISOL, FREE RESULT: 0.36 UG/DL — SIGNIFICANT CHANGE UP

## 2022-08-11 PROCEDURE — 71045 X-RAY EXAM CHEST 1 VIEW: CPT

## 2022-08-11 PROCEDURE — 82533 TOTAL CORTISOL: CPT

## 2022-08-11 PROCEDURE — 95819 EEG AWAKE AND ASLEEP: CPT

## 2022-08-11 PROCEDURE — 70450 CT HEAD/BRAIN W/O DYE: CPT

## 2022-08-11 PROCEDURE — 85027 COMPLETE CBC AUTOMATED: CPT

## 2022-08-11 PROCEDURE — 84484 ASSAY OF TROPONIN QUANT: CPT

## 2022-08-11 PROCEDURE — 70553 MRI BRAIN STEM W/O & W/DYE: CPT

## 2022-08-11 PROCEDURE — 82088 ASSAY OF ALDOSTERONE: CPT

## 2022-08-11 PROCEDURE — 86803 HEPATITIS C AB TEST: CPT

## 2022-08-11 PROCEDURE — 87635 SARS-COV-2 COVID-19 AMP PRB: CPT

## 2022-08-11 PROCEDURE — 84100 ASSAY OF PHOSPHORUS: CPT

## 2022-08-11 PROCEDURE — 84436 ASSAY OF TOTAL THYROXINE: CPT

## 2022-08-11 PROCEDURE — 83935 ASSAY OF URINE OSMOLALITY: CPT

## 2022-08-11 PROCEDURE — 84300 ASSAY OF URINE SODIUM: CPT

## 2022-08-11 PROCEDURE — 93306 TTE W/DOPPLER COMPLETE: CPT

## 2022-08-11 PROCEDURE — 84443 ASSAY THYROID STIM HORMONE: CPT

## 2022-08-11 PROCEDURE — 83036 HEMOGLOBIN GLYCOSYLATED A1C: CPT

## 2022-08-11 PROCEDURE — 97162 PT EVAL MOD COMPLEX 30 MIN: CPT

## 2022-08-11 PROCEDURE — 84244 ASSAY OF RENIN: CPT

## 2022-08-11 PROCEDURE — 85025 COMPLETE CBC W/AUTO DIFF WBC: CPT

## 2022-08-11 PROCEDURE — 36415 COLL VENOUS BLD VENIPUNCTURE: CPT

## 2022-08-11 PROCEDURE — 80053 COMPREHEN METABOLIC PANEL: CPT

## 2022-08-11 PROCEDURE — 84132 ASSAY OF SERUM POTASSIUM: CPT

## 2022-08-11 PROCEDURE — 83735 ASSAY OF MAGNESIUM: CPT

## 2022-08-11 PROCEDURE — 93005 ELECTROCARDIOGRAM TRACING: CPT

## 2022-08-11 PROCEDURE — 80048 BASIC METABOLIC PNL TOTAL CA: CPT

## 2022-08-11 PROCEDURE — 82962 GLUCOSE BLOOD TEST: CPT

## 2022-08-11 PROCEDURE — 83930 ASSAY OF BLOOD OSMOLALITY: CPT

## 2022-08-11 PROCEDURE — 99285 EMERGENCY DEPT VISIT HI MDM: CPT

## 2022-08-11 PROCEDURE — 95957 EEG DIGITAL ANALYSIS: CPT

## 2022-08-11 PROCEDURE — A9585: CPT

## 2023-04-25 PROBLEM — G50.0 TRIGEMINAL NEURALGIA: Chronic | Status: ACTIVE | Noted: 2022-07-17

## 2023-06-08 ENCOUNTER — APPOINTMENT (OUTPATIENT)
Dept: INTERNAL MEDICINE | Facility: CLINIC | Age: 63
End: 2023-06-08

## 2023-11-01 ENCOUNTER — EMERGENCY (EMERGENCY)
Facility: HOSPITAL | Age: 63
LOS: 1 days | Discharge: HOME | End: 2023-11-01
Attending: STUDENT IN AN ORGANIZED HEALTH CARE EDUCATION/TRAINING PROGRAM | Admitting: STUDENT IN AN ORGANIZED HEALTH CARE EDUCATION/TRAINING PROGRAM
Payer: MEDICAID

## 2023-11-01 VITALS
HEART RATE: 72 BPM | SYSTOLIC BLOOD PRESSURE: 195 MMHG | TEMPERATURE: 98 F | RESPIRATION RATE: 18 BRPM | DIASTOLIC BLOOD PRESSURE: 95 MMHG | HEIGHT: 68 IN | WEIGHT: 160.06 LBS | OXYGEN SATURATION: 99 %

## 2023-11-01 VITALS
HEART RATE: 67 BPM | SYSTOLIC BLOOD PRESSURE: 173 MMHG | OXYGEN SATURATION: 97 % | DIASTOLIC BLOOD PRESSURE: 86 MMHG | TEMPERATURE: 97 F | RESPIRATION RATE: 16 BRPM

## 2023-11-01 LAB
ANION GAP SERPL CALC-SCNC: 11 MMOL/L — SIGNIFICANT CHANGE UP (ref 5–17)
ANION GAP SERPL CALC-SCNC: 11 MMOL/L — SIGNIFICANT CHANGE UP (ref 5–17)
BASOPHILS # BLD AUTO: 0.04 K/UL — SIGNIFICANT CHANGE UP (ref 0–0.2)
BASOPHILS # BLD AUTO: 0.04 K/UL — SIGNIFICANT CHANGE UP (ref 0–0.2)
BASOPHILS NFR BLD AUTO: 1 % — SIGNIFICANT CHANGE UP (ref 0–2)
BASOPHILS NFR BLD AUTO: 1 % — SIGNIFICANT CHANGE UP (ref 0–2)
BUN SERPL-MCNC: 17 MG/DL — SIGNIFICANT CHANGE UP (ref 7–23)
BUN SERPL-MCNC: 17 MG/DL — SIGNIFICANT CHANGE UP (ref 7–23)
CALCIUM SERPL-MCNC: 9.6 MG/DL — SIGNIFICANT CHANGE UP (ref 8.4–10.5)
CALCIUM SERPL-MCNC: 9.6 MG/DL — SIGNIFICANT CHANGE UP (ref 8.4–10.5)
CHLORIDE SERPL-SCNC: 94 MMOL/L — LOW (ref 96–108)
CHLORIDE SERPL-SCNC: 94 MMOL/L — LOW (ref 96–108)
CO2 SERPL-SCNC: 24 MMOL/L — SIGNIFICANT CHANGE UP (ref 22–31)
CO2 SERPL-SCNC: 24 MMOL/L — SIGNIFICANT CHANGE UP (ref 22–31)
CREAT SERPL-MCNC: 0.65 MG/DL — SIGNIFICANT CHANGE UP (ref 0.5–1.3)
CREAT SERPL-MCNC: 0.65 MG/DL — SIGNIFICANT CHANGE UP (ref 0.5–1.3)
EGFR: 100 ML/MIN/1.73M2 — SIGNIFICANT CHANGE UP
EGFR: 100 ML/MIN/1.73M2 — SIGNIFICANT CHANGE UP
EOSINOPHIL # BLD AUTO: 0.09 K/UL — SIGNIFICANT CHANGE UP (ref 0–0.5)
EOSINOPHIL # BLD AUTO: 0.09 K/UL — SIGNIFICANT CHANGE UP (ref 0–0.5)
EOSINOPHIL NFR BLD AUTO: 2.3 % — SIGNIFICANT CHANGE UP (ref 0–6)
EOSINOPHIL NFR BLD AUTO: 2.3 % — SIGNIFICANT CHANGE UP (ref 0–6)
GLUCOSE SERPL-MCNC: 93 MG/DL — SIGNIFICANT CHANGE UP (ref 70–99)
GLUCOSE SERPL-MCNC: 93 MG/DL — SIGNIFICANT CHANGE UP (ref 70–99)
HCT VFR BLD CALC: 35.4 % — SIGNIFICANT CHANGE UP (ref 34.5–45)
HCT VFR BLD CALC: 35.4 % — SIGNIFICANT CHANGE UP (ref 34.5–45)
HGB BLD-MCNC: 12 G/DL — SIGNIFICANT CHANGE UP (ref 11.5–15.5)
HGB BLD-MCNC: 12 G/DL — SIGNIFICANT CHANGE UP (ref 11.5–15.5)
IMM GRANULOCYTES NFR BLD AUTO: 0.3 % — SIGNIFICANT CHANGE UP (ref 0–0.9)
IMM GRANULOCYTES NFR BLD AUTO: 0.3 % — SIGNIFICANT CHANGE UP (ref 0–0.9)
LYMPHOCYTES # BLD AUTO: 1.08 K/UL — SIGNIFICANT CHANGE UP (ref 1–3.3)
LYMPHOCYTES # BLD AUTO: 1.08 K/UL — SIGNIFICANT CHANGE UP (ref 1–3.3)
LYMPHOCYTES # BLD AUTO: 27.3 % — SIGNIFICANT CHANGE UP (ref 13–44)
LYMPHOCYTES # BLD AUTO: 27.3 % — SIGNIFICANT CHANGE UP (ref 13–44)
MCHC RBC-ENTMCNC: 29.3 PG — SIGNIFICANT CHANGE UP (ref 27–34)
MCHC RBC-ENTMCNC: 29.3 PG — SIGNIFICANT CHANGE UP (ref 27–34)
MCHC RBC-ENTMCNC: 33.9 GM/DL — SIGNIFICANT CHANGE UP (ref 32–36)
MCHC RBC-ENTMCNC: 33.9 GM/DL — SIGNIFICANT CHANGE UP (ref 32–36)
MCV RBC AUTO: 86.6 FL — SIGNIFICANT CHANGE UP (ref 80–100)
MCV RBC AUTO: 86.6 FL — SIGNIFICANT CHANGE UP (ref 80–100)
MONOCYTES # BLD AUTO: 0.2 K/UL — SIGNIFICANT CHANGE UP (ref 0–0.9)
MONOCYTES # BLD AUTO: 0.2 K/UL — SIGNIFICANT CHANGE UP (ref 0–0.9)
MONOCYTES NFR BLD AUTO: 5.1 % — SIGNIFICANT CHANGE UP (ref 2–14)
MONOCYTES NFR BLD AUTO: 5.1 % — SIGNIFICANT CHANGE UP (ref 2–14)
NEUTROPHILS # BLD AUTO: 2.53 K/UL — SIGNIFICANT CHANGE UP (ref 1.8–7.4)
NEUTROPHILS # BLD AUTO: 2.53 K/UL — SIGNIFICANT CHANGE UP (ref 1.8–7.4)
NEUTROPHILS NFR BLD AUTO: 64 % — SIGNIFICANT CHANGE UP (ref 43–77)
NEUTROPHILS NFR BLD AUTO: 64 % — SIGNIFICANT CHANGE UP (ref 43–77)
NRBC # BLD: 0 /100 WBCS — SIGNIFICANT CHANGE UP (ref 0–0)
NRBC # BLD: 0 /100 WBCS — SIGNIFICANT CHANGE UP (ref 0–0)
NT-PROBNP SERPL-SCNC: 155 PG/ML — SIGNIFICANT CHANGE UP (ref 0–300)
NT-PROBNP SERPL-SCNC: 155 PG/ML — SIGNIFICANT CHANGE UP (ref 0–300)
PLATELET # BLD AUTO: 273 K/UL — SIGNIFICANT CHANGE UP (ref 150–400)
PLATELET # BLD AUTO: 273 K/UL — SIGNIFICANT CHANGE UP (ref 150–400)
POTASSIUM SERPL-MCNC: 4 MMOL/L — SIGNIFICANT CHANGE UP (ref 3.5–5.3)
POTASSIUM SERPL-MCNC: 4 MMOL/L — SIGNIFICANT CHANGE UP (ref 3.5–5.3)
POTASSIUM SERPL-SCNC: 4 MMOL/L — SIGNIFICANT CHANGE UP (ref 3.5–5.3)
POTASSIUM SERPL-SCNC: 4 MMOL/L — SIGNIFICANT CHANGE UP (ref 3.5–5.3)
RBC # BLD: 4.09 M/UL — SIGNIFICANT CHANGE UP (ref 3.8–5.2)
RBC # BLD: 4.09 M/UL — SIGNIFICANT CHANGE UP (ref 3.8–5.2)
RBC # FLD: 12.7 % — SIGNIFICANT CHANGE UP (ref 10.3–14.5)
RBC # FLD: 12.7 % — SIGNIFICANT CHANGE UP (ref 10.3–14.5)
SODIUM SERPL-SCNC: 129 MMOL/L — LOW (ref 135–145)
SODIUM SERPL-SCNC: 129 MMOL/L — LOW (ref 135–145)
TROPONIN T, HIGH SENSITIVITY RESULT: 12 NG/L — SIGNIFICANT CHANGE UP (ref 0–51)
TROPONIN T, HIGH SENSITIVITY RESULT: 12 NG/L — SIGNIFICANT CHANGE UP (ref 0–51)
WBC # BLD: 3.95 K/UL — SIGNIFICANT CHANGE UP (ref 3.8–10.5)
WBC # BLD: 3.95 K/UL — SIGNIFICANT CHANGE UP (ref 3.8–10.5)
WBC # FLD AUTO: 3.95 K/UL — SIGNIFICANT CHANGE UP (ref 3.8–10.5)
WBC # FLD AUTO: 3.95 K/UL — SIGNIFICANT CHANGE UP (ref 3.8–10.5)

## 2023-11-01 PROCEDURE — 85025 COMPLETE CBC W/AUTO DIFF WBC: CPT

## 2023-11-01 PROCEDURE — 99285 EMERGENCY DEPT VISIT HI MDM: CPT

## 2023-11-01 PROCEDURE — 71045 X-RAY EXAM CHEST 1 VIEW: CPT | Mod: 26

## 2023-11-01 PROCEDURE — 71275 CT ANGIOGRAPHY CHEST: CPT | Mod: 26,MA

## 2023-11-01 PROCEDURE — 80048 BASIC METABOLIC PNL TOTAL CA: CPT

## 2023-11-01 PROCEDURE — 99284 EMERGENCY DEPT VISIT MOD MDM: CPT | Mod: 25

## 2023-11-01 PROCEDURE — 71275 CT ANGIOGRAPHY CHEST: CPT | Mod: MA

## 2023-11-01 PROCEDURE — 83880 ASSAY OF NATRIURETIC PEPTIDE: CPT

## 2023-11-01 PROCEDURE — 74174 CTA ABD&PLVS W/CONTRAST: CPT | Mod: 26,MA

## 2023-11-01 PROCEDURE — 71045 X-RAY EXAM CHEST 1 VIEW: CPT

## 2023-11-01 PROCEDURE — 36415 COLL VENOUS BLD VENIPUNCTURE: CPT

## 2023-11-01 PROCEDURE — 84484 ASSAY OF TROPONIN QUANT: CPT

## 2023-11-01 PROCEDURE — 74174 CTA ABD&PLVS W/CONTRAST: CPT | Mod: MA

## 2023-11-01 NOTE — ED PROVIDER NOTE - CLINICAL SUMMARY MEDICAL DECISION MAKING FREE TEXT BOX
dissection study negative, no VS abnoramlities indicative orf tamponade. ECHO performed by cardiology with no evidence of tamponade, mild-moderate effusion. possibility of admission vs dc d/w patient for eval of effusion, risks of tamponade d/w patinet. as symptoms are chronic she prefers outpatinet follow up. strict return precautions given. hypertensive, patient states she has longstanding history of hypertension however when last on antihypertensives, several years ago she became hypotensive so elects not to take them. no signs of mendez/pericarditis.

## 2023-11-01 NOTE — ED PROVIDER NOTE - PATIENT PORTAL LINK FT
You can access the FollowMyHealth Patient Portal offered by [facility] by registering at the following website: [email or web address]. By joining [employer]’s FollowMyHealth portal, you will also be able to view your health information using other applications (apps) compatible with our system.

## 2023-11-01 NOTE — ED PROVIDER NOTE - IV ALTEPLASE ADMIN OUTSIDE HIDDEN
Resume ashish - 1200 mg daily    Weight loss encouraged    Avoid carbs and sugar sweetened beverages   Drink water, coffee, and tea (without sugar)         show

## 2023-11-01 NOTE — ED PROVIDER NOTE - OBJECTIVE STATEMENT
63F hx chronic pain of chest abdomen (for past 2 years, so far negative neuro workup) on oxcarbazepine, oxycodone, gabapentin. yesterday had worsening of chest pain while sitting down, radiated towards her back. additionally for past few weeks has been having increasingly worsening BIRD, had an ECHO done eysterday which showed pericardial effusion, sent ot ED for eval. 63F hx chronic pain of chest and abdomen (for past 2 years, so far negative neuro workup) on oxcarbazepine, oxycodone, gabapentin. yesterday had episodechest pain while sitting down, radiated towards her back. additionally for past few weeks to months has had mild BIRD, able to walk blocks on end, but noticed she has had to walk slower than usual, had an ECHO done yesterday which showed pericardial effusion, sent ot ED for eval.

## 2023-11-01 NOTE — ED ADULT TRIAGE NOTE - OTHER COMPLAINTS
States no pmhx. Maintaining patent airway, states intermittent sob, denies dizziness/n/v/fever/chills.
WDL

## 2023-11-01 NOTE — ED ADULT NURSE REASSESSMENT NOTE - NS ED NURSE REASSESS COMMENT FT1
Pt received resting on stretcher, waiting for CT results. Safety precautions in place, will continue to monitor.

## 2023-11-01 NOTE — ED PROVIDER NOTE - CARE PROVIDER_API CALL
Avtar Cruzde  Cardiovascular Disease  [address] [place] [address]  Phone: [phone]  Fax: [phone]  Follow Up Time:

## 2023-11-01 NOTE — ED ADULT NURSE NOTE - OBJECTIVE STATEMENT
62 y/o F with pmhx "nerve pain" presents to the ED c/o body wide pain with CP episode last night. States she had an echo yesterday that showed fluid around her heart. Relays her pulmonologist told her to come directly to the ED. Notes having a h/o resolved HTN. Pt is a poor historian, history limited. Denies SOB, palpitations, dysuria, edema, and any other complaints at this time. On exam, A&Ox4, NAD, ambulatory on RA. Appears anxious. No edema. Respirations even and unlabored. HTN in triage. PIV placed. Labs sent.

## 2023-11-01 NOTE — ED PROVIDER NOTE - PHYSICAL EXAMINATION
General: Awake, alert and oriented. No acute distress. Well developed, hydrated and nourished. Appears stated age.  Skin: Skin in warm, dry and intact without rashes or lesions. Appropriate color for ethnicity  HENMT: head normocephalic and atraumatic; bilateral external ears without swelling. no nasal discharge. moist oral mucosa. supple neck, trachea midline  EYES: Conjunctiva clear. nonicteric sclera. EOM intact, Eyelids are normal in appearance without swelling or lesions.  Cardiac: well perfused, s1, s2, rrr  Respiratory: breathing comfortably on room air. no audible wheezing or stridor, lungs ctab, no chest wall tenderness to palpation  Abdominal: nondistended, soft, notnender, no pulsatile masses  MSK: Neck and back are without deformity, visible external skin changes, or signs of trauma. Curvature of the cervical, thoracic, and lumbar spine are within normal limits. no external signs of trauma. no apparent deficits in ROM of any extremity. no leg swelling or tenderness  Neurological: The patient is awake, alert and oriented to person, place, and time with normal speech. CN 2-12 grossly intact. no apparent deficits. Memory is normal and thought process is intact.  Psychiatric: Appropriate mood and affect. Good judgement and insight.

## 2023-11-01 NOTE — ED ADULT NURSE NOTE - NSFALLUNIVINTERV_ED_ALL_ED
Bed/Stretcher in lowest position, wheels locked, appropriate side rails in place/Call bell, personal items and telephone in reach/Instruct patient to call for assistance before getting out of bed/chair/stretcher/Non-slip footwear applied when patient is off stretcher/[place] to call system/Physically safe environment - no spills, clutter or unnecessary equipment/Purposeful proactive rounding/Room/bathroom lighting operational, light cord in reach

## 2023-11-01 NOTE — ED PROVIDER NOTE - NSFOLLOWUPINSTRUCTIONS_ED_ALL_ED_FT
WHAT YOU NEED TO KNOW:    What is pericardial effusion? Pericardial effusion is a buildup of fluid in the pericardium. The pericardium is a 2-layer sac that surrounds the heart. The sac normally contains a small amount of clear fluid between its layers. This allows the heart to move smoothly against other organs in the chest as it beats. The fluid buildup puts pressure on your heart. This makes it difficult for your heart to pump. Fluid may collect slowly or quickly.      What causes pericardial effusion? The cause may be unknown, or it may be caused by any of the following:    Diseases such as rheumatoid arthritis, cancer, hypothyroidism (low levels of thyroid hormone), or kidney failure    Infections of the pericardium caused by viruses, bacteria, or parasites    Inflammation of the pericardium, called pericarditis    Injury or trauma that damages the pericardium, such as a puncture wound in the chest, or a heart attack    Procedures such as heart surgery or radiation therapy near the heart  What are the signs and symptoms of pericardial effusion? You may not have any symptoms, or you may have any of the following:    Chest pain    Cough    Feeling lightheaded or faint    Swelling of your legs and feet    Shortness of breath, especially when lying down    Trouble swallowing food  How is pericardial effusion diagnosed? Your healthcare provider will ask about your health and the medicines you are taking. You may need any of the following tests:    An EKG records your heart rhythm and how fast your heart beats. It is used to check for damage or problems in your heart.    Blood tests may be done to look for signs of infection or other possible causes of pericardial effusion.    A chest x-ray is a picture of your lungs and heart. Chest x-rays may show fluid around the heart and lungs.    An echocardiogram is a type of ultrasound. Sound waves are used to show the structure and function of your heart.    A CT or MRI scan takes pictures of your chest. The pictures may show fluid around your heart or other problems. You may be given contrast liquid to help your heart show up better in pictures. Tell the healthcare provider if you have ever had an allergic reaction to contrast liquid. Do not enter the MRI room with anything metal. Metal can cause serious injury. Tell the healthcare provider if you have any metal in or on your body.    A sample of fluid from the pericardial sac may be taken with a needle. The fluid is sent to a lab for tests.  How is pericardial effusion treated? Treatment depends on the cause of your pericardial effusion. You may need any of the following:    Antibiotics help treat an infection caused by bacteria.    Steroids help decrease swelling.    NSAIDs help decrease swelling and pain or fever. This medicine is available with or without a doctor's order. NSAIDs can cause stomach bleeding or kidney problems in certain people. If you take blood thinner medicine, always ask your healthcare provider if NSAIDs are safe for you. Always read the medicine label and follow directions.    Pericardial drainage relieves pressure on your heart so it can pump normally. A catheter is inserted into the pericardium to drain fluid.    A balloon procedure is another way to drain extra fluid. A needle is put into the pericardium and a guidewire is threaded through the needle. The needle is then removed. A catheter with a balloon at its end is passed over the guidewire into the correct position in the pericardium. The balloon is inflated and deflated several times to create an opening for the fluid to drain out.    Surgery may be done to remove part or most of the pericardium.  Call your local emergency number (911 in the [place]) if:    You have any of the following signs of a heart attack:  Squeezing, pressure, or pain in your chest    You may also have any of the following:  Discomfort or pain in your back, neck, jaw, stomach, or arm    Shortness of breath    Nausea or vomiting    Lightheadedness or a sudden cold sweat    You have sudden chest pain.    You have sudden trouble breathing.  When should I seek immediate care?    You feel lightheaded or faint.    You have swelling in your legs or feet.  When should I call my doctor?    You have a fever.    You have questions or concerns about your condition or care.  CARE AGREEMENT:    You have the right to help plan your care. Learn about your health condition and how it may be treated. Discuss treatment options with your healthcare providers to decide what care you want to receive. You always have the right to refuse treatment.

## 2023-11-01 NOTE — ED ADULT NURSE NOTE - OTHER COMPLAINTS
States no pmhx. Maintaining patent airway, states intermittent sob, denies dizziness/n/v/fever/chills.

## 2023-11-01 NOTE — CHART NOTE - NSCHARTNOTEFT_GEN_A_CORE
Fellow called to ED to do bedside ECHO to eval for tamponade physiology for a patient sent in after being found to have a pericardial effusion on an outpt ECHO (not in our system). Vitals /83 with HR in the 80s.     Prelim ECHO Findings:  LV and RV systolic function normal  Small-intermediate sized pericardial effusion  No RV systolic collapse  No invagination of the RA in diastole  IVC collapsable >50% with inspiration and less than 2.1cm  No e/o tamponade physiology on ECHO Fellow called to ED to do bedside ECHO to eval for tamponade physiology for a patient sent in after being found to have a pericardial effusion on an outpt ECHO (not in our system). Vitals /83 with HR in the 80s.     Prelim ECHO Findings:  LV and RV systolic function normal  Small-intermediate sized pericardial effusion  No RV diastolic collapse  No invagination of the RA in systole  IVC collapsable >50% with inspiration and less than 2.1cm  No e/o tamponade physiology on ECHO

## 2023-11-03 DIAGNOSIS — I31.39 OTHER PERICARDIAL EFFUSION (NONINFLAMMATORY): ICD-10-CM

## 2023-11-03 DIAGNOSIS — G89.29 OTHER CHRONIC PAIN: ICD-10-CM

## 2023-11-03 DIAGNOSIS — R07.89 OTHER CHEST PAIN: ICD-10-CM

## 2023-11-03 DIAGNOSIS — I10 ESSENTIAL (PRIMARY) HYPERTENSION: ICD-10-CM

## 2023-11-03 DIAGNOSIS — R10.9 UNSPECIFIED ABDOMINAL PAIN: ICD-10-CM

## 2023-12-19 ENCOUNTER — INPATIENT (INPATIENT)
Facility: HOSPITAL | Age: 63
LOS: 1 days | Discharge: HOME | DRG: 281 | End: 2023-12-21
Attending: STUDENT IN AN ORGANIZED HEALTH CARE EDUCATION/TRAINING PROGRAM | Admitting: STUDENT IN AN ORGANIZED HEALTH CARE EDUCATION/TRAINING PROGRAM
Payer: MEDICAID

## 2023-12-19 VITALS
HEIGHT: 68 IN | SYSTOLIC BLOOD PRESSURE: 165 MMHG | WEIGHT: 119.93 LBS | DIASTOLIC BLOOD PRESSURE: 94 MMHG | RESPIRATION RATE: 17 BRPM | TEMPERATURE: 98 F | HEART RATE: 65 BPM | OXYGEN SATURATION: 99 %

## 2023-12-19 LAB
ALBUMIN SERPL ELPH-MCNC: 4.6 G/DL — SIGNIFICANT CHANGE UP (ref 3.3–5)
ALBUMIN SERPL ELPH-MCNC: 4.6 G/DL — SIGNIFICANT CHANGE UP (ref 3.3–5)
ALBUMIN SERPL ELPH-MCNC: 5 G/DL — SIGNIFICANT CHANGE UP (ref 3.3–5)
ALBUMIN SERPL ELPH-MCNC: 5 G/DL — SIGNIFICANT CHANGE UP (ref 3.3–5)
ALP SERPL-CCNC: 126 U/L — HIGH (ref 40–120)
ALP SERPL-CCNC: 126 U/L — HIGH (ref 40–120)
ALP SERPL-CCNC: 139 U/L — HIGH (ref 40–120)
ALP SERPL-CCNC: 139 U/L — HIGH (ref 40–120)
ALT FLD-CCNC: 39 U/L — SIGNIFICANT CHANGE UP (ref 10–45)
ALT FLD-CCNC: 39 U/L — SIGNIFICANT CHANGE UP (ref 10–45)
ALT FLD-CCNC: SIGNIFICANT CHANGE UP (ref 10–45)
ALT FLD-CCNC: SIGNIFICANT CHANGE UP (ref 10–45)
ANION GAP SERPL CALC-SCNC: 13 MMOL/L — SIGNIFICANT CHANGE UP (ref 5–17)
AST SERPL-CCNC: 33 U/L — SIGNIFICANT CHANGE UP (ref 10–40)
AST SERPL-CCNC: 33 U/L — SIGNIFICANT CHANGE UP (ref 10–40)
AST SERPL-CCNC: SIGNIFICANT CHANGE UP (ref 10–40)
AST SERPL-CCNC: SIGNIFICANT CHANGE UP (ref 10–40)
BASOPHILS # BLD AUTO: 0.05 K/UL — SIGNIFICANT CHANGE UP (ref 0–0.2)
BASOPHILS # BLD AUTO: 0.05 K/UL — SIGNIFICANT CHANGE UP (ref 0–0.2)
BASOPHILS NFR BLD AUTO: 1.1 % — SIGNIFICANT CHANGE UP (ref 0–2)
BASOPHILS NFR BLD AUTO: 1.1 % — SIGNIFICANT CHANGE UP (ref 0–2)
BILIRUB SERPL-MCNC: 0.3 MG/DL — SIGNIFICANT CHANGE UP (ref 0.2–1.2)
BILIRUB SERPL-MCNC: 0.3 MG/DL — SIGNIFICANT CHANGE UP (ref 0.2–1.2)
BILIRUB SERPL-MCNC: 0.4 MG/DL — SIGNIFICANT CHANGE UP (ref 0.2–1.2)
BILIRUB SERPL-MCNC: 0.4 MG/DL — SIGNIFICANT CHANGE UP (ref 0.2–1.2)
BUN SERPL-MCNC: 9 MG/DL — SIGNIFICANT CHANGE UP (ref 7–23)
CALCIUM SERPL-MCNC: 10 MG/DL — SIGNIFICANT CHANGE UP (ref 8.4–10.5)
CALCIUM SERPL-MCNC: 10 MG/DL — SIGNIFICANT CHANGE UP (ref 8.4–10.5)
CALCIUM SERPL-MCNC: 9.1 MG/DL — SIGNIFICANT CHANGE UP (ref 8.4–10.5)
CALCIUM SERPL-MCNC: 9.1 MG/DL — SIGNIFICANT CHANGE UP (ref 8.4–10.5)
CHLORIDE SERPL-SCNC: 88 MMOL/L — LOW (ref 96–108)
CHLORIDE SERPL-SCNC: 88 MMOL/L — LOW (ref 96–108)
CHLORIDE SERPL-SCNC: 92 MMOL/L — LOW (ref 96–108)
CHLORIDE SERPL-SCNC: 92 MMOL/L — LOW (ref 96–108)
CO2 SERPL-SCNC: 21 MMOL/L — LOW (ref 22–31)
CO2 SERPL-SCNC: 21 MMOL/L — LOW (ref 22–31)
CO2 SERPL-SCNC: 22 MMOL/L — SIGNIFICANT CHANGE UP (ref 22–31)
CO2 SERPL-SCNC: 22 MMOL/L — SIGNIFICANT CHANGE UP (ref 22–31)
CREAT SERPL-MCNC: 0.54 MG/DL — SIGNIFICANT CHANGE UP (ref 0.5–1.3)
CREAT SERPL-MCNC: 0.54 MG/DL — SIGNIFICANT CHANGE UP (ref 0.5–1.3)
CREAT SERPL-MCNC: 0.59 MG/DL — SIGNIFICANT CHANGE UP (ref 0.5–1.3)
CREAT SERPL-MCNC: 0.59 MG/DL — SIGNIFICANT CHANGE UP (ref 0.5–1.3)
EGFR: 102 ML/MIN/1.73M2 — SIGNIFICANT CHANGE UP
EGFR: 102 ML/MIN/1.73M2 — SIGNIFICANT CHANGE UP
EGFR: 105 ML/MIN/1.73M2 — SIGNIFICANT CHANGE UP
EGFR: 105 ML/MIN/1.73M2 — SIGNIFICANT CHANGE UP
EOSINOPHIL # BLD AUTO: 0.08 K/UL — SIGNIFICANT CHANGE UP (ref 0–0.5)
EOSINOPHIL # BLD AUTO: 0.08 K/UL — SIGNIFICANT CHANGE UP (ref 0–0.5)
EOSINOPHIL NFR BLD AUTO: 1.7 % — SIGNIFICANT CHANGE UP (ref 0–6)
EOSINOPHIL NFR BLD AUTO: 1.7 % — SIGNIFICANT CHANGE UP (ref 0–6)
GLUCOSE SERPL-MCNC: 97 MG/DL — SIGNIFICANT CHANGE UP (ref 70–99)
GLUCOSE SERPL-MCNC: 97 MG/DL — SIGNIFICANT CHANGE UP (ref 70–99)
GLUCOSE SERPL-MCNC: 99 MG/DL — SIGNIFICANT CHANGE UP (ref 70–99)
GLUCOSE SERPL-MCNC: 99 MG/DL — SIGNIFICANT CHANGE UP (ref 70–99)
HCT VFR BLD CALC: 32.2 % — LOW (ref 34.5–45)
HCT VFR BLD CALC: 32.2 % — LOW (ref 34.5–45)
HGB BLD-MCNC: 11 G/DL — LOW (ref 11.5–15.5)
HGB BLD-MCNC: 11 G/DL — LOW (ref 11.5–15.5)
IMM GRANULOCYTES NFR BLD AUTO: 0.2 % — SIGNIFICANT CHANGE UP (ref 0–0.9)
IMM GRANULOCYTES NFR BLD AUTO: 0.2 % — SIGNIFICANT CHANGE UP (ref 0–0.9)
LIDOCAIN IGE QN: 39 U/L — SIGNIFICANT CHANGE UP (ref 7–60)
LIDOCAIN IGE QN: 39 U/L — SIGNIFICANT CHANGE UP (ref 7–60)
LYMPHOCYTES # BLD AUTO: 0.95 K/UL — LOW (ref 1–3.3)
LYMPHOCYTES # BLD AUTO: 0.95 K/UL — LOW (ref 1–3.3)
LYMPHOCYTES # BLD AUTO: 20.7 % — SIGNIFICANT CHANGE UP (ref 13–44)
LYMPHOCYTES # BLD AUTO: 20.7 % — SIGNIFICANT CHANGE UP (ref 13–44)
MCHC RBC-ENTMCNC: 28.7 PG — SIGNIFICANT CHANGE UP (ref 27–34)
MCHC RBC-ENTMCNC: 28.7 PG — SIGNIFICANT CHANGE UP (ref 27–34)
MCHC RBC-ENTMCNC: 34.2 GM/DL — SIGNIFICANT CHANGE UP (ref 32–36)
MCHC RBC-ENTMCNC: 34.2 GM/DL — SIGNIFICANT CHANGE UP (ref 32–36)
MCV RBC AUTO: 84.1 FL — SIGNIFICANT CHANGE UP (ref 80–100)
MCV RBC AUTO: 84.1 FL — SIGNIFICANT CHANGE UP (ref 80–100)
MONOCYTES # BLD AUTO: 0.36 K/UL — SIGNIFICANT CHANGE UP (ref 0–0.9)
MONOCYTES # BLD AUTO: 0.36 K/UL — SIGNIFICANT CHANGE UP (ref 0–0.9)
MONOCYTES NFR BLD AUTO: 7.8 % — SIGNIFICANT CHANGE UP (ref 2–14)
MONOCYTES NFR BLD AUTO: 7.8 % — SIGNIFICANT CHANGE UP (ref 2–14)
NEUTROPHILS # BLD AUTO: 3.15 K/UL — SIGNIFICANT CHANGE UP (ref 1.8–7.4)
NEUTROPHILS # BLD AUTO: 3.15 K/UL — SIGNIFICANT CHANGE UP (ref 1.8–7.4)
NEUTROPHILS NFR BLD AUTO: 68.5 % — SIGNIFICANT CHANGE UP (ref 43–77)
NEUTROPHILS NFR BLD AUTO: 68.5 % — SIGNIFICANT CHANGE UP (ref 43–77)
NRBC # BLD: 0 /100 WBCS — SIGNIFICANT CHANGE UP (ref 0–0)
NRBC # BLD: 0 /100 WBCS — SIGNIFICANT CHANGE UP (ref 0–0)
PLATELET # BLD AUTO: 241 K/UL — SIGNIFICANT CHANGE UP (ref 150–400)
PLATELET # BLD AUTO: 241 K/UL — SIGNIFICANT CHANGE UP (ref 150–400)
POTASSIUM SERPL-MCNC: 4.6 MMOL/L — SIGNIFICANT CHANGE UP (ref 3.5–5.3)
POTASSIUM SERPL-MCNC: 4.6 MMOL/L — SIGNIFICANT CHANGE UP (ref 3.5–5.3)
POTASSIUM SERPL-MCNC: SIGNIFICANT CHANGE UP (ref 3.5–5.3)
POTASSIUM SERPL-MCNC: SIGNIFICANT CHANGE UP (ref 3.5–5.3)
POTASSIUM SERPL-SCNC: 4.6 MMOL/L — SIGNIFICANT CHANGE UP (ref 3.5–5.3)
POTASSIUM SERPL-SCNC: 4.6 MMOL/L — SIGNIFICANT CHANGE UP (ref 3.5–5.3)
POTASSIUM SERPL-SCNC: SIGNIFICANT CHANGE UP (ref 3.5–5.3)
POTASSIUM SERPL-SCNC: SIGNIFICANT CHANGE UP (ref 3.5–5.3)
PROT SERPL-MCNC: 7.2 G/DL — SIGNIFICANT CHANGE UP (ref 6–8.3)
PROT SERPL-MCNC: 7.2 G/DL — SIGNIFICANT CHANGE UP (ref 6–8.3)
PROT SERPL-MCNC: 8.5 G/DL — HIGH (ref 6–8.3)
PROT SERPL-MCNC: 8.5 G/DL — HIGH (ref 6–8.3)
RBC # BLD: 3.83 M/UL — SIGNIFICANT CHANGE UP (ref 3.8–5.2)
RBC # BLD: 3.83 M/UL — SIGNIFICANT CHANGE UP (ref 3.8–5.2)
RBC # FLD: 12.3 % — SIGNIFICANT CHANGE UP (ref 10.3–14.5)
RBC # FLD: 12.3 % — SIGNIFICANT CHANGE UP (ref 10.3–14.5)
SODIUM SERPL-SCNC: 122 MMOL/L — LOW (ref 135–145)
SODIUM SERPL-SCNC: 122 MMOL/L — LOW (ref 135–145)
SODIUM SERPL-SCNC: 127 MMOL/L — LOW (ref 135–145)
SODIUM SERPL-SCNC: 127 MMOL/L — LOW (ref 135–145)
TROPONIN T, HIGH SENSITIVITY RESULT: 149 NG/L — CRITICAL HIGH (ref 0–51)
TROPONIN T, HIGH SENSITIVITY RESULT: 149 NG/L — CRITICAL HIGH (ref 0–51)
TROPONIN T, HIGH SENSITIVITY RESULT: 195 NG/L — CRITICAL HIGH (ref 0–51)
TROPONIN T, HIGH SENSITIVITY RESULT: 195 NG/L — CRITICAL HIGH (ref 0–51)
WBC # BLD: 4.6 K/UL — SIGNIFICANT CHANGE UP (ref 3.8–10.5)
WBC # BLD: 4.6 K/UL — SIGNIFICANT CHANGE UP (ref 3.8–10.5)
WBC # FLD AUTO: 4.6 K/UL — SIGNIFICANT CHANGE UP (ref 3.8–10.5)
WBC # FLD AUTO: 4.6 K/UL — SIGNIFICANT CHANGE UP (ref 3.8–10.5)

## 2023-12-19 PROCEDURE — 93010 ELECTROCARDIOGRAM REPORT: CPT

## 2023-12-19 PROCEDURE — 71045 X-RAY EXAM CHEST 1 VIEW: CPT | Mod: 26

## 2023-12-19 PROCEDURE — 99223 1ST HOSP IP/OBS HIGH 75: CPT

## 2023-12-19 PROCEDURE — 99285 EMERGENCY DEPT VISIT HI MDM: CPT

## 2023-12-19 RX ORDER — OXCARBAZEPINE 300 MG/1
300 TABLET, FILM COATED ORAL
Refills: 0 | Status: DISCONTINUED | OUTPATIENT
Start: 2023-12-19 | End: 2023-12-21

## 2023-12-19 RX ORDER — ASPIRIN/CALCIUM CARB/MAGNESIUM 324 MG
81 TABLET ORAL DAILY
Refills: 0 | Status: DISCONTINUED | OUTPATIENT
Start: 2023-12-19 | End: 2023-12-21

## 2023-12-19 RX ORDER — GABAPENTIN 400 MG/1
800 CAPSULE ORAL
Refills: 0 | Status: DISCONTINUED | OUTPATIENT
Start: 2023-12-19 | End: 2023-12-21

## 2023-12-19 RX ORDER — ACETAMINOPHEN 500 MG
650 TABLET ORAL EVERY 6 HOURS
Refills: 0 | Status: DISCONTINUED | OUTPATIENT
Start: 2023-12-19 | End: 2023-12-21

## 2023-12-19 RX ORDER — KETOROLAC TROMETHAMINE 30 MG/ML
15 SYRINGE (ML) INJECTION ONCE
Refills: 0 | Status: DISCONTINUED | OUTPATIENT
Start: 2023-12-19 | End: 2023-12-19

## 2023-12-19 RX ORDER — ASPIRIN/CALCIUM CARB/MAGNESIUM 324 MG
324 TABLET ORAL ONCE
Refills: 0 | Status: COMPLETED | OUTPATIENT
Start: 2023-12-19 | End: 2023-12-19

## 2023-12-19 RX ORDER — OXYCODONE HYDROCHLORIDE 5 MG/1
10 TABLET ORAL ONCE
Refills: 0 | Status: DISCONTINUED | OUTPATIENT
Start: 2023-12-19 | End: 2023-12-19

## 2023-12-19 RX ORDER — CLONAZEPAM 1 MG
0.5 TABLET ORAL AT BEDTIME
Refills: 0 | Status: DISCONTINUED | OUTPATIENT
Start: 2023-12-19 | End: 2023-12-21

## 2023-12-19 RX ORDER — ACETAMINOPHEN 500 MG
1000 TABLET ORAL ONCE
Refills: 0 | Status: COMPLETED | OUTPATIENT
Start: 2023-12-19 | End: 2023-12-19

## 2023-12-19 RX ORDER — SODIUM CHLORIDE 9 MG/ML
1000 INJECTION INTRAMUSCULAR; INTRAVENOUS; SUBCUTANEOUS ONCE
Refills: 0 | Status: COMPLETED | OUTPATIENT
Start: 2023-12-19 | End: 2023-12-19

## 2023-12-19 RX ORDER — OXYCODONE AND ACETAMINOPHEN 5; 325 MG/1; MG/1
1 TABLET ORAL EVERY 4 HOURS
Refills: 0 | Status: DISCONTINUED | OUTPATIENT
Start: 2023-12-19 | End: 2023-12-21

## 2023-12-19 RX ORDER — SODIUM CHLORIDE 9 MG/ML
1000 INJECTION INTRAMUSCULAR; INTRAVENOUS; SUBCUTANEOUS
Refills: 0 | Status: DISCONTINUED | OUTPATIENT
Start: 2023-12-19 | End: 2023-12-20

## 2023-12-19 RX ORDER — PANTOPRAZOLE SODIUM 20 MG/1
40 TABLET, DELAYED RELEASE ORAL
Refills: 0 | Status: DISCONTINUED | OUTPATIENT
Start: 2023-12-19 | End: 2023-12-21

## 2023-12-19 RX ORDER — ASPIRIN/CALCIUM CARB/MAGNESIUM 324 MG
81 TABLET ORAL DAILY
Refills: 0 | Status: DISCONTINUED | OUTPATIENT
Start: 2023-12-19 | End: 2023-12-19

## 2023-12-19 RX ADMIN — OXYCODONE HYDROCHLORIDE 10 MILLIGRAM(S): 5 TABLET ORAL at 20:24

## 2023-12-19 RX ADMIN — Medication 0.5 MILLIGRAM(S): at 23:55

## 2023-12-19 RX ADMIN — Medication 400 MILLIGRAM(S): at 20:24

## 2023-12-19 RX ADMIN — Medication 324 MILLIGRAM(S): at 21:28

## 2023-12-19 RX ADMIN — OXYCODONE AND ACETAMINOPHEN 1 TABLET(S): 5; 325 TABLET ORAL at 23:55

## 2023-12-19 RX ADMIN — SODIUM CHLORIDE 1000 MILLILITER(S): 9 INJECTION INTRAMUSCULAR; INTRAVENOUS; SUBCUTANEOUS at 22:00

## 2023-12-19 RX ADMIN — SODIUM CHLORIDE 1000 MILLILITER(S): 9 INJECTION INTRAMUSCULAR; INTRAVENOUS; SUBCUTANEOUS at 20:17

## 2023-12-19 RX ADMIN — Medication 15 MILLIGRAM(S): at 20:24

## 2023-12-19 RX ADMIN — Medication 1000 MILLIGRAM(S): at 21:00

## 2023-12-19 NOTE — ED PROVIDER NOTE - CLINICAL SUMMARY MEDICAL DECISION MAKING FREE TEXT BOX
63 year old female with history of chronic chest/abdominal pain on oxcarbazepine, percocet, gabapentin, sees neuro/pain management, presenting with acute on chronic pain and vomiting/diarrhea. 63 year old female with history of chronic chest/abdominal pain on oxcarbazepine, percocet, gabapentin, sees neuro/pain management, presenting with acute on chronic pain and vomiting/diarrhea. Overall nontoxic here with good vitals. EKG nsr nonischemic. Exam is largely nonfocal--full mentation, abdomen soft/NT, lungs clear, heart RRR, no gross evidence of infection vs acute intraabdominal surgical pathology vs volume overload vs shock. Will send cmp, cbc, lipase, trop. May need admission even if work up negative for pain control/FTT/dehydration.

## 2023-12-19 NOTE — H&P ADULT - NSICDXPASTMEDICALHX_GEN_ALL_CORE_FT
PAST MEDICAL HISTORY:  Anxiety and depression     Chronic back pain     Chronic stable angina     Essential hypertension     Neuropathy     No contraindication to deep vein thrombosis (DVT) prophylaxis

## 2023-12-19 NOTE — ED PROVIDER NOTE - OBJECTIVE STATEMENT
63 year old female with history of chronic chest/abdominal pain on oxcarbazepine, percocet, gabapentin, sees neuro/pain management, presenting with acute on chronic pain and vomiting/diarrhea. Per patient and spouse collateral at bedside--for past ~5 days, has had minimal PO intake, feels worse than usual pain in ribs and "muscles that control peristalsis", feels unable to move due to pain. No fevers, chills, new cough, hematemesis, bloody stools. Has most recently tried ketamine therapy for her chronic pain that has not helped.

## 2023-12-19 NOTE — H&P ADULT - ASSESSMENT
64 y/o female with PMHx  of  Depression/Anxiety, Hypertension (not on medication),  Neuropathy, chronic chest/abdominal pain (pt. is followed by Neurologist and Pain Management; recently tried Ketamine therapy), IBS,  Hyponatremia presents to [facility] ER this evening, 12/19/23, complaining of worsening midsternal chest pain, described as "sharp" radiating downward to lower abdomen with HA one episode of nbnb emesis this evening and reports diarrhea for one month.    64 y/o female with PMHx  of  Depression/Anxiety, Hypertension (not on medication),  Neuropathy, chronic chest/abdominal pain (pt. is followed by Neurologist and Pain Management; recently tried Ketamine therapy), IBS,  Hyponatremia presents to [facility] ER this evening, 12/19/23, complaining of worsening midsternal chest pain, described as "sharp" radiating downward to lower abdomen with HA one episode of nbnb emesis this evening and reports diarrhea for one month.

## 2023-12-19 NOTE — H&P ADULT - NSHPPHYSICALEXAM_GEN_ALL_CORE
T(C): 36.4 (12-19-23 @ 21:05), Max: 36.4 (12-19-23 @ 19:14)  HR: 62 (12-19-23 @ 21:05) (62 - 65)  BP: 149/84 (12-19-23 @ 21:05) (149/84 - 165/94)  RR: 17 (12-19-23 @ 21:05) (17 - 17)  SpO2: 99% (12-19-23 @ 21:05) (99% - 99%)  Wt(kg): --    Appearance: Normal	  HEENT:   Normal oral mucosa,, EOMI	  Neck: Supple,- JVD; No Carotid Bruit and 2+ pulses B/L  Cardiovascular: Normal S1 S2, No JVD, No murmurs  Respiratory: Lungs clear to auscultation//No Rales, Rhonchi, Wheezing	  Gastrointestinal:  Soft, Non-tender, + BS	  Skin: No rashes, No ecchymoses, No cyanosis  Extremities: Normal range of motion, No clubbing, cyanosis or edema  Vascular: Femoral pulses 2+ b/l without bruit, DP 1+ b/l, PT 1+ b/l  Neurologic: Non-focal  Psychiatry: A & O x 4, Mood & affect appropriate

## 2023-12-19 NOTE — H&P ADULT - NSHPSOURCEINFOTX_GEN_ALL_CORE
Emergency Contact  Riley Mcmahon [phone] Emergency Contact  Riley Mcmahon [phone] Emergency Contact  Riley Mcmahon [phone] [facility] [phone] for rest of medication. Emergency Contact  Riley Mcmahon [phone] [facility] [phone] for rest of medication.

## 2023-12-19 NOTE — H&P ADULT - PROBLEM SELECTOR PLAN 6
On Gabapentin 800mg qid. Percocet 325-5mg PO q4hrs PRN, Oxcarbazepine 400mg qid    Please call patient's pharmacy [place] [phone] for rest of medication. On Gabapentin 800mg qid. Percocet 325-5mg PO q4hrs PRN, Oxcarbazepine 400mg qid    Please call patient's pharmacy [place] [phone] for rest of medication.

## 2023-12-19 NOTE — ED ADULT NURSE NOTE - NSFALLUNIVINTERV_ED_ALL_ED
Bed/Stretcher in lowest position, wheels locked, appropriate side rails in place/Call bell, personal items and telephone in reach/Instruct patient to call for assistance before getting out of bed/chair/stretcher/Non-slip footwear applied when patient is off stretcher/[place] to call system/Physically safe environment - no spills, clutter or unnecessary equipment/Purposeful proactive rounding/Room/bathroom lighting operational, light cord in reach Bed/Stretcher in lowest position, wheels locked, appropriate side rails in place/Call bell, personal items and telephone in reach/Instruct patient to call for assistance before getting out of bed/chair/stretcher/Non-slip footwear applied when patient is off stretcher/[place] to call system/Physically safe environment - no spills, clutter or unnecessary equipment/Purposeful proactive rounding/Room/bathroom lighting operational, light cord in reach

## 2023-12-19 NOTE — H&P ADULT - HISTORY OF PRESENT ILLNESS
A&O X4 Full Code    62 y/o female with PMHx  of chronic chest/abdominal pain on oxcarbazepine, percocet, gabapentin, sees neuro/pain management, presenting with acute on chronic pain and vomiting/diarrhea. Per patient and spouse collateral at bedside--for past ~5 days, has had minimal PO intake, feels worse than usual pain in ribs and "muscles that control peristalsis", feels unable to move due to pain. No fevers, chills, new cough, hematemesis, bloody stools. Has most recently tried ketamine therapy for her chronic pain that has not helped.   A&O X4 Full Code    64 y/o female with PMHx  of chronic chest/abdominal pain on oxcarbazepine, percocet, gabapentin, sees neuro/pain management, presenting with acute on chronic pain and vomiting/diarrhea. Per patient and spouse collateral at bedside--for past ~5 days, has had minimal PO intake, feels worse than usual pain in ribs and "muscles that control peristalsis", feels unable to move due to pain. No fevers, chills, new cough, hematemesis, bloody stools. Has most recently tried ketamine therapy for her chronic pain that has not helped.   A&O X4 Full Code    64 y/o female with PMHx  of  Depression/Anxiety, Neuropathy, chronic chest/abdominal pain (prior admission with neg workup), Hyponatremia presents to [facility] ER on oxcarbazepine, percocet, gabapentin, sees neuro/pain management, presenting with acute on chronic pain and vomiting/diarrhea. Per patient and spouse collateral at bedside--for past ~5 days, has had minimal PO intake, feels worse than usual pain in ribs and "muscles that control peristalsis", feels unable to move due to pain. No fevers, chills, new cough, hematemesis, bloody stools. Has most recently tried ketamine therapy for her chronic pain that has not helped.   A&O X4 Full Code    64 y/o female with PMHx  of  Depression/Anxiety, Neuropathy, chronic chest/abdominal pain (prior admission with neg workup), Hyponatremia presents to [facility] ER on oxcarbazepine, percocet, gabapentin, sees neuro/pain management, presenting with acute on chronic pain and vomiting/diarrhea. Per patient and spouse collateral at bedside--for past ~5 days, has had minimal PO intake, feels worse than usual pain in ribs and "muscles that control peristalsis", feels unable to move due to pain. No fevers, chills, new cough, hematemesis, bloody stools. Has most recently tried ketamine therapy for her chronic pain that has not helped.   A&O X4 Full Code    64 y/o female with PMHx  of  Depression/Anxiety, Hypertension,  Neuropathy, chronic chest/abdominal pain (prior admission with neg workup), Hyponatremia presents to [facility] ER this evening, 12/19/23, complaining of          on oxcarbazepine, percocet, gabapentin, sees neuro/pain management, presenting with acute on chronic pain and vomiting/diarrhea. Per patient and spouse collateral at bedside--for past ~5 days, has had minimal PO intake, feels worse than usual pain in ribs and "muscles that control peristalsis", feels unable to move due to pain. No fevers, chills, new cough, hematemesis, bloody stools. Has most recently tried ketamine therapy for her chronic pain that has not helped.   A&O X4 Full Code    64 y/o female with PMHx  of  Depression/Anxiety, Hypertension,  Neuropathy, chronic chest/abdominal pain (prior admission with neg workup), Hyponatremia presents to [facility] ER this evening, 12/19/23, complaining of          on oxcarbazepine, percocet, gabapentin, sees neuro/pain management, presenting with acute on chronic pain and vomiting/diarrhea. Per patient and spouse collateral at bedside--for past ~5 days, has had minimal PO intake, feels worse than usual pain in ribs and "muscles that control peristalsis", feels unable to move due to pain. No fevers, chills, new cough, hematemesis, bloody stools. Has most recently tried ketamine therapy for her chronic pain that has not helped.   A&O X4 Full Code  "worsening," chronic midsternal chest pain radiating down abdomen with elevated Troponin THS    64 y/o female with PMHx  of  Depression/Anxiety, Hypertension (not on medication),  Neuropathy, chronic chest/abdominal pain (pt. is followed by Neurologist and Pain Management; recently tried Ketamine therapy), IBS,  Hyponatremia presents to [facility] ER this evening, 12/19/23, complaining of worsening midsternal chest pain, described as "sharp" radiating downward to lower abdomen with HA one episode of nbnb emesis this evening and reports diarrhea for one month.     In speaking with patient, she reports 2 year hx of chronic chest pain/abdominal pain (generalized pain), Neuropathy,  in which she follows up with Neurologist, Dr. Kristofer Borrero [phone] and Pain Management, Dr. Kang Kim [phone]. Pt. recently tired Ketamine therapy for her  chronic pain which was unsuccessful.   According to patient, for the past five days she has poor PO intake, worsening HA, midsternal "sharp" chest pain radiating downwards to lower abdomen, one episode of nbnb emesis with diarrhea for one month.  She has hx of IBS between constipation and diarrhea, more recently for diarrhea.   She describes the radiating pain as "muscles that control peristalsis", unable to move due to pain.   Pt. denies cardiac hx or recent cardiac workup, stress test or Echocardiogram.  She denies recent URI, fever, chills, SOB, dizziness, lightheadedness,     In ER ECG reveals NSR 68bpm with non specific ST-T wave changes, Troponin T -->195, H/H 11.0/32.2, Na 122-->127, Alk Phos 126 and CXR  AP reveals no acute pathology seen.     VSS: Temp98.2F; /85, HR 67bpm, RR 19 and O2 RA 98%.               on oxcarbazepine, percocet, gabapentin, sees neuro/pain management, presenting with acute on chronic pain and vomiting/diarrhea. Per patient and spouse collateral at bedside--for past ~5 days, has had minimal PO intake, feels worse than usual pain in ribs and "muscles that control peristalsis", feels unable to move due to pain. No fevers, chills, new cough, hematemesis, bloody stools. Has most recently tried ketamine therapy for her chronic pain that has not helped.   A&O X4 Full Code  "worsening," chronic midsternal chest pain radiating down abdomen with elevated Troponin THS    62 y/o female with PMHx  of  Depression/Anxiety, Hypertension (not on medication),  Neuropathy, chronic chest/abdominal pain (pt. is followed by Neurologist and Pain Management; recently tried Ketamine therapy), IBS,  Hyponatremia presents to [facility] ER this evening, 12/19/23, complaining of worsening midsternal chest pain, described as "sharp" radiating downward to lower abdomen with HA one episode of nbnb emesis this evening and reports diarrhea for one month.     In speaking with patient, she reports 2 year hx of chronic chest pain/abdominal pain (generalized pain), Neuropathy,  in which she follows up with Neurologist, Dr. Kristofer Borrero [phone] and Pain Management, Dr. Kang Kim [phone]. Pt. recently tired Ketamine therapy for her  chronic pain which was unsuccessful.   According to patient, for the past five days she has poor PO intake, worsening HA, midsternal "sharp" chest pain radiating downwards to lower abdomen, one episode of nbnb emesis with diarrhea for one month.  She has hx of IBS between constipation and diarrhea, more recently for diarrhea.   She describes the radiating pain as "muscles that control peristalsis", unable to move due to pain.   Pt. denies cardiac hx or recent cardiac workup, stress test or Echocardiogram.  She denies recent URI, fever, chills, SOB, dizziness, lightheadedness,     In ER ECG reveals NSR 68bpm with non specific ST-T wave changes, Troponin T -->195, H/H 11.0/32.2, Na 122-->127, Alk Phos 126 and CXR  AP reveals no acute pathology seen.     VSS: Temp98.2F; /85, HR 67bpm, RR 19 and O2 RA 98%.               on oxcarbazepine, percocet, gabapentin, sees neuro/pain management, presenting with acute on chronic pain and vomiting/diarrhea. Per patient and spouse collateral at bedside--for past ~5 days, has had minimal PO intake, feels worse than usual pain in ribs and "muscles that control peristalsis", feels unable to move due to pain. No fevers, chills, new cough, hematemesis, bloody stools. Has most recently tried ketamine therapy for her chronic pain that has not helped.   A&O X4 Full Code  "worsening," chronic midsternal chest pain radiating down abdomen with elevated Troponin THS    62 y/o female with PMHx  of  Depression/Anxiety, Hypertension (not on medication),  Neuropathy, chronic chest/abdominal pain (pt. is followed by Neurologist and Pain Management; recently tried Ketamine therapy), IBS,  Hyponatremia presents to [facility] ER this evening, 12/19/23, complaining of worsening midsternal chest pain, described as "sharp" radiating downward to lower abdomen with HA one episode of nbnb emesis this evening and reports diarrhea for one month.     In speaking with patient, she reports 2 year hx of chronic chest pain/abdominal pain (generalized pain), Neuropathy,  in which she follows up with Neurologist, Dr. Kristofer Borrero [phone] and Pain Management, Dr. Kang Kim [phone]. Pt. recently tired Ketamine therapy for her  chronic pain which was unsuccessful.   According to patient, for the past five days she has poor PO intake, worsening HA, midsternal "sharp" chest pain radiating downwards to lower abdomen, one episode of nbnb emesis with diarrhea for one month.  She has hx of IBS between constipation and diarrhea, more recently for diarrhea.   She describes the radiating pain as "muscles that control peristalsis", unable to move due to pain.   Pt. denies cardiac hx or recent cardiac workup, stress test or Echocardiogram.  She denies recent URI, fever, chills, SOB, dizziness, lightheadedness,     In ER ECG reveals NSR 68bpm with non specific ST-T wave changes, Troponin T -->195, H/H 11.0/32.2, Na 122-->127, Alk Phos 126 and CXR  AP reveals no acute pathology seen.   Pt. received ASA 324mg PO X1, Toradol 15mg IV X1 and Oxycodone IR 10mg PO X1  VSS: Temp98.2F; /85, HR 67bpm, RR 19 and O2 RA 98%.                A&O X4 Full Code  "worsening," chronic midsternal chest pain radiating down abdomen with elevated Troponin THS    64 y/o female with PMHx  of  Depression/Anxiety, Hypertension (not on medication),  Neuropathy, chronic chest/abdominal pain (pt. is followed by Neurologist and Pain Management; recently tried Ketamine therapy), IBS,  Hyponatremia presents to [facility] ER this evening, 12/19/23, complaining of worsening midsternal chest pain, described as "sharp" radiating downward to lower abdomen with HA one episode of nbnb emesis this evening and reports diarrhea for one month.     In speaking with patient, she reports 2 year hx of chronic chest pain/abdominal pain (generalized pain), Neuropathy,  in which she follows up with Neurologist, Dr. Kristofer Borrero [phone] and Pain Management, Dr. Kang Kim [phone]. Pt. recently tired Ketamine therapy for her  chronic pain which was unsuccessful.   According to patient, for the past five days she has poor PO intake, worsening HA, midsternal "sharp" chest pain radiating downwards to lower abdomen, one episode of nbnb emesis with diarrhea for one month.  She has hx of IBS between constipation and diarrhea, more recently for diarrhea.   She describes the radiating pain as "muscles that control peristalsis", unable to move due to pain.   Pt. denies cardiac hx or recent cardiac workup, stress test or Echocardiogram.  She denies recent URI, fever, chills, SOB, dizziness, lightheadedness,     In ER ECG reveals NSR 68bpm with non specific ST-T wave changes, Troponin T -->195, H/H 11.0/32.2, Na 122-->127, Alk Phos 126 and CXR  AP reveals no acute pathology seen.   Pt. received ASA 324mg PO X1, Toradol 15mg IV X1 and Oxycodone IR 10mg PO X1  VSS: Temp98.2F; /85, HR 67bpm, RR 19 and O2 RA 98%.

## 2023-12-19 NOTE — ED PROVIDER NOTE - PROGRESS NOTE DETAILS
Darin Reeves MD: Patient noted to have elevated troponin to 149 with normal Cr. Reassessed at bedside--stable, does still endorse her usual/chronic chest/abdominal pain but not worse. Na level also notably low to 122--per HIE review, patient with similar result in August and november of this year, likely chronic. Patient is AOx4 without confusion. Will admit to cardiac tele for further ischemic eval. Spoke with cards fellow. Will give ASA load for now.

## 2023-12-19 NOTE — ED ADULT TRIAGE NOTE - CHIEF COMPLAINT QUOTE
Pt c/o HTN, HA, vomiting, diarrhea x yesterday. "her blood pressure was 200/100 at home". Per family, decreased po intake for 5 days. Pt reports history of HTN, has not taken medication for years. Denies cp, dizziness, sob, change in vision.

## 2023-12-19 NOTE — H&P ADULT - NS ATTEND AMEND GEN_ALL_CORE FT
I saw, evaluated, and examined the patient at the bedside. I discussed the patient’s case with the ACP and agree with ACP’s note, ROS findings, physical exam, assessment, and plan as documented in the ACP’s note, with the following addendum.     64 yo lady PMHx of HTN, anxiety, chronic pain syndrome, and IBS who was admitted for possibly cardiac chest pain and elevated hsTrop    Assessment  1. Possibly cardiac chest pain and elevated hsTrop  S/p LHC w/ myocardial bridging of mLAD and anterolateral hypokinesis c/w variant stress induced CM  2. Myocardial bridging  3. Variant stress induced CM  4. HTN  5. Moderate pericardial effusion    Plan  1. Will start metoprolol succinate 25mg PO QD for negative inotropic effect for stress induced CM  2. Will start amlodipine 5mg PO QD tmrw am for myocardial bridging of LAD  3. She needs to f/u with me in my [place] clinic in 2 weeks for f/u TTE    During non face-to-face time, I reviewed relevant portions of the patient’s medical record. During face-to-face time, I took a relevant history and examined the patient. I also explained differential diagnoses, relevant cardiac diagnoses, workup, and management plan, which required a high level of medical decision making. I answered all questions related to the patient's medical conditions.     Marjorie Wilkinson M.D.  CARDIOLOGY ATTENDING

## 2023-12-19 NOTE — ED ADULT NURSE NOTE - OBJECTIVE STATEMENT
63yoF came to ED c/o hypertension Pt c/o HTN, HA, vomiting, diarrhea x yesterday. Per family, decreased po intake for 5 days pt states she has a loss of appetite " I feel like my peristalsis muscles arent working that properly, It hurts more when I eat". Pt states that today she had a bad headache, had one episode of vomiting. After she vomited her  took her blood pressure and it was 200/100 so they came to the ED. pt has history of hypertension, but has not needed medications in years. Pt suffers from chronic pain, sees her neurologist and pain management doctor. states the pain she is feeling now is different.

## 2023-12-19 NOTE — H&P ADULT - PROBLEM SELECTOR PLAN 2
Chronic midsternal chest pain for 2yrs worse today (follows up with Neurologist and Pain Management)  No cardiac hx  -ECG no ischemic changes  -NPO for further cardiac workup NST vs CCTA  -ASA Ec 81mg PO daily

## 2023-12-19 NOTE — ED ADULT TRIAGE NOTE - HEIGHT IN INCHES
8 Normal muscle tone/strength Normal muscle tone/strength Normal muscle tone/strength Normal muscle tone/strength I will START or STAY ON the medications listed below when I get home from the hospital:  None

## 2023-12-19 NOTE — H&P ADULT - PROBLEM SELECTOR PLAN 1
ECG reveals NS@68bpm with non specific ST-T wave changes; Troponin T -->195  -F/U CE in AM with CK CKMB if elevated start heparin gtt   -received ASA 324mg PO X1, ASA Ec 81 in AM  -NPO for NST vs CCTA in AM   TTE AM ECG reveals NS@68bpm with TWI in leads V1-V2 (no comparision); Troponin T -->195  -F/U CE in AM with CK CKMB if elevated start heparin gtt   -received ASA 324mg PO X1, ASA Ec 81 in AM  -NPO for NST vs CCTA in AM   TTE AM

## 2023-12-19 NOTE — H&P ADULT - PROBLEM SELECTOR PLAN 4
Hx between constipation and diarrhea  - currently diarrhea for one month. f/u culture  -Fluids [id number]/hr X12 hours Hx between constipation and diarrhea  - currently diarrhea for one month. f/u culture  -Fluids [id number]/hr X12 hours

## 2023-12-19 NOTE — H&P ADULT - NSHPOUTPATIENTPROVIDERS_GEN_ALL_CORE
PCP Dr. Arguelles [facility] [phone] PCP Dr. Arguelles [facility] [phone] PCP Dr. Arguelles [place] [phone]; Neurologist, Dr. Kristofer Borrero [phone]; Pain Management Dr. Kang Kim [phone] PCP Dr. Arguelles [place] [phone]; Neurologist, Dr. Kristofer Borrero [phone]; Pain Management Dr. Kang Kim [phone]

## 2023-12-19 NOTE — H&P ADULT - NSHPLABSRESULTS_GEN_ALL_CORE
11.0   4.60  )-----------( 241      ( 19 Dec 2023 20:46 )             32.2       12-19    127<L>  |  92<L>  |  9   ----------------------------<  97  4.6   |  22  |  0.59    Ca    9.1      19 Dec 2023 21:18    TPro  7.2  /  Alb  4.6  /  TBili  0.3  /  DBili  x   /  AST  33  /  ALT  39  /  AlkPhos  126<H>  12-19                Urinalysis Basic - ( 19 Dec 2023 21:18 )    Color: x / Appearance: x / SG: x / pH: x  Gluc: 97 mg/dL / Ketone: x  / Bili: x / Urobili: x   Blood: x / Protein: x / Nitrite: x   Leuk Esterase: x / RBC: x / WBC x   Sq Epi: x / Non Sq Epi: x / Bacteria: x        EKG: NSR@68bpm with non specific ST-T wave changes 11.0   4.60  )-----------( 241      ( 19 Dec 2023 20:46 )             32.2       12-19    127<L>  |  92<L>  |  9   ----------------------------<  97  4.6   |  22  |  0.59    Ca    9.1      19 Dec 2023 21:18    TPro  7.2  /  Alb  4.6  /  TBili  0.3  /  DBili  x   /  AST  33  /  ALT  39  /  AlkPhos  126<H>  12-19                Urinalysis Basic - ( 19 Dec 2023 21:18 )    Color: x / Appearance: x / SG: x / pH: x  Gluc: 97 mg/dL / Ketone: x  / Bili: x / Urobili: x   Blood: x / Protein: x / Nitrite: x   Leuk Esterase: x / RBC: x / WBC x   Sq Epi: x / Non Sq Epi: x / Bacteria: x        EKG: NSR@68bpm with TWI V1-V2

## 2023-12-19 NOTE — H&P ADULT - REASON FOR ADMISSION
Generalized body pain, chronic chest pain/abdominal pain X2 yrs worse this evening, diarrhea X1 month and new HA this evening.

## 2023-12-19 NOTE — H&P ADULT - NSHPREVIEWOFSYSTEMS_GEN_ALL_CORE
GENERAL, CONSTITUTIONAL : denies recent weight loss, fever, chills  EYES, VISION: denies changes in vision   EARS, NOSE, THROAT: denies hearing loss  HEART, CARDIOVASCULAR: admits  chest pain,  denies arrhythmia, palpitations,   RESPIRATORY: Denies cough, SOB, wheezing, PND, orthopnea  GASTROINTESTINAL: Denies abdominal pain, heartburn, bloody stool, dark tarry stool  GENITOURINARY: Denies frequent urination, urgency  MUSCULOSKELETAL denies joint pain or swelling, restricted motion, musculoskeletal pain.   SKIN & INTEGUMENTARY Denies rashes, sores, blisters, blisters, growths.  NEUROLOGICAL: Admits to numbness or tingling sensations, sensation loss, burning.  Hx Neuropathy  PSYCHIATRIC: Admits to  nervousness, anxiety, depression  ENDOCRINE Denies heat or cold intolerance, excessive thirst  HEMATOLOGIC/LYMPHATIC: Denies abnormal bleeding, bleeding of any kind

## 2023-12-19 NOTE — ED PROVIDER NOTE - PHYSICAL EXAMINATION
Gen - Nontoxic but chronically ill appearing; A+Ox3   HEENT - NCAT, EOMI, moist mucous membranes, clear oropharynx  Neck - supple, no palpable lymphadenopathy  Resp - CTAB, no increased WOB  CV -  RRR, no m/r/g  Abd - soft, NT, ND; no guarding or rebound  Back - no midline, paraspinous, or CVA tenderness  MSK - FROM of b/l UE and LE, no gross deformities  Extrem - no LE edema/erythema/tenderness  Neuro - no focal motor or sensation deficits  Skin - warm, well perfused

## 2023-12-20 DIAGNOSIS — I20.89 OTHER FORMS OF ANGINA PECTORIS: ICD-10-CM

## 2023-12-20 DIAGNOSIS — F41.9 ANXIETY DISORDER, UNSPECIFIED: ICD-10-CM

## 2023-12-20 DIAGNOSIS — I10 ESSENTIAL (PRIMARY) HYPERTENSION: ICD-10-CM

## 2023-12-20 DIAGNOSIS — R10.9 UNSPECIFIED ABDOMINAL PAIN: ICD-10-CM

## 2023-12-20 DIAGNOSIS — I21.4 NON-ST ELEVATION (NSTEMI) MYOCARDIAL INFARCTION: ICD-10-CM

## 2023-12-20 DIAGNOSIS — G62.9 POLYNEUROPATHY, UNSPECIFIED: ICD-10-CM

## 2023-12-20 DIAGNOSIS — K58.9 IRRITABLE BOWEL SYNDROME WITHOUT DIARRHEA: ICD-10-CM

## 2023-12-20 DIAGNOSIS — Z78.9 OTHER SPECIFIED HEALTH STATUS: ICD-10-CM

## 2023-12-20 LAB
A1C WITH ESTIMATED AVERAGE GLUCOSE RESULT: 5.1 % — SIGNIFICANT CHANGE UP (ref 4–5.6)
A1C WITH ESTIMATED AVERAGE GLUCOSE RESULT: 5.1 % — SIGNIFICANT CHANGE UP (ref 4–5.6)
ALBUMIN SERPL ELPH-MCNC: 4.5 G/DL — SIGNIFICANT CHANGE UP (ref 3.3–5)
ALBUMIN SERPL ELPH-MCNC: 4.5 G/DL — SIGNIFICANT CHANGE UP (ref 3.3–5)
ALP SERPL-CCNC: 127 U/L — HIGH (ref 40–120)
ALP SERPL-CCNC: 127 U/L — HIGH (ref 40–120)
ALT FLD-CCNC: 36 U/L — SIGNIFICANT CHANGE UP (ref 10–45)
ALT FLD-CCNC: 36 U/L — SIGNIFICANT CHANGE UP (ref 10–45)
ANION GAP SERPL CALC-SCNC: 9 MMOL/L — SIGNIFICANT CHANGE UP (ref 5–17)
ANION GAP SERPL CALC-SCNC: 9 MMOL/L — SIGNIFICANT CHANGE UP (ref 5–17)
APPEARANCE UR: CLEAR — SIGNIFICANT CHANGE UP
APPEARANCE UR: CLEAR — SIGNIFICANT CHANGE UP
APTT BLD: 41.2 SEC — HIGH (ref 24.5–35.6)
APTT BLD: 41.2 SEC — HIGH (ref 24.5–35.6)
APTT BLD: 46.6 SEC — HIGH (ref 24.5–35.6)
APTT BLD: 46.6 SEC — HIGH (ref 24.5–35.6)
APTT BLD: 53.7 SEC — HIGH (ref 24.5–35.6)
APTT BLD: 53.7 SEC — HIGH (ref 24.5–35.6)
AST SERPL-CCNC: 31 U/L — SIGNIFICANT CHANGE UP (ref 10–40)
AST SERPL-CCNC: 31 U/L — SIGNIFICANT CHANGE UP (ref 10–40)
BASOPHILS # BLD AUTO: 0.04 K/UL — SIGNIFICANT CHANGE UP (ref 0–0.2)
BASOPHILS # BLD AUTO: 0.04 K/UL — SIGNIFICANT CHANGE UP (ref 0–0.2)
BASOPHILS # BLD AUTO: 0.05 K/UL — SIGNIFICANT CHANGE UP (ref 0–0.2)
BASOPHILS # BLD AUTO: 0.05 K/UL — SIGNIFICANT CHANGE UP (ref 0–0.2)
BASOPHILS NFR BLD AUTO: 1 % — SIGNIFICANT CHANGE UP (ref 0–2)
BASOPHILS NFR BLD AUTO: 1 % — SIGNIFICANT CHANGE UP (ref 0–2)
BASOPHILS NFR BLD AUTO: 1.1 % — SIGNIFICANT CHANGE UP (ref 0–2)
BASOPHILS NFR BLD AUTO: 1.1 % — SIGNIFICANT CHANGE UP (ref 0–2)
BILIRUB SERPL-MCNC: 0.4 MG/DL — SIGNIFICANT CHANGE UP (ref 0.2–1.2)
BILIRUB SERPL-MCNC: 0.4 MG/DL — SIGNIFICANT CHANGE UP (ref 0.2–1.2)
BILIRUB UR-MCNC: NEGATIVE — SIGNIFICANT CHANGE UP
BILIRUB UR-MCNC: NEGATIVE — SIGNIFICANT CHANGE UP
BUN SERPL-MCNC: 6 MG/DL — LOW (ref 7–23)
BUN SERPL-MCNC: 6 MG/DL — LOW (ref 7–23)
CALCIUM SERPL-MCNC: 9.9 MG/DL — SIGNIFICANT CHANGE UP (ref 8.4–10.5)
CALCIUM SERPL-MCNC: 9.9 MG/DL — SIGNIFICANT CHANGE UP (ref 8.4–10.5)
CHLORIDE SERPL-SCNC: 96 MMOL/L — SIGNIFICANT CHANGE UP (ref 96–108)
CHLORIDE SERPL-SCNC: 96 MMOL/L — SIGNIFICANT CHANGE UP (ref 96–108)
CHLORIDE UR-SCNC: 58 MMOL/L — SIGNIFICANT CHANGE UP
CHLORIDE UR-SCNC: 58 MMOL/L — SIGNIFICANT CHANGE UP
CK MB CFR SERPL CALC: 6.1 NG/ML — SIGNIFICANT CHANGE UP (ref 0–6.7)
CK MB CFR SERPL CALC: 6.1 NG/ML — SIGNIFICANT CHANGE UP (ref 0–6.7)
CK MB CFR SERPL CALC: 7.3 NG/ML — HIGH (ref 0–6.7)
CK MB CFR SERPL CALC: 7.3 NG/ML — HIGH (ref 0–6.7)
CK SERPL-CCNC: 101 U/L — SIGNIFICANT CHANGE UP (ref 25–170)
CK SERPL-CCNC: 101 U/L — SIGNIFICANT CHANGE UP (ref 25–170)
CK SERPL-CCNC: 94 U/L — SIGNIFICANT CHANGE UP (ref 25–170)
CK SERPL-CCNC: 94 U/L — SIGNIFICANT CHANGE UP (ref 25–170)
CO2 SERPL-SCNC: 27 MMOL/L — SIGNIFICANT CHANGE UP (ref 22–31)
CO2 SERPL-SCNC: 27 MMOL/L — SIGNIFICANT CHANGE UP (ref 22–31)
COLOR SPEC: YELLOW — SIGNIFICANT CHANGE UP
COLOR SPEC: YELLOW — SIGNIFICANT CHANGE UP
CREAT SERPL-MCNC: 0.55 MG/DL — SIGNIFICANT CHANGE UP (ref 0.5–1.3)
CREAT SERPL-MCNC: 0.55 MG/DL — SIGNIFICANT CHANGE UP (ref 0.5–1.3)
CRP SERPL-MCNC: <3 MG/L — SIGNIFICANT CHANGE UP (ref 0–4)
CRP SERPL-MCNC: <3 MG/L — SIGNIFICANT CHANGE UP (ref 0–4)
DIFF PNL FLD: NEGATIVE — SIGNIFICANT CHANGE UP
DIFF PNL FLD: NEGATIVE — SIGNIFICANT CHANGE UP
EGFR: 104 ML/MIN/1.73M2 — SIGNIFICANT CHANGE UP
EGFR: 104 ML/MIN/1.73M2 — SIGNIFICANT CHANGE UP
EOSINOPHIL # BLD AUTO: 0.06 K/UL — SIGNIFICANT CHANGE UP (ref 0–0.5)
EOSINOPHIL # BLD AUTO: 0.06 K/UL — SIGNIFICANT CHANGE UP (ref 0–0.5)
EOSINOPHIL # BLD AUTO: 0.09 K/UL — SIGNIFICANT CHANGE UP (ref 0–0.5)
EOSINOPHIL # BLD AUTO: 0.09 K/UL — SIGNIFICANT CHANGE UP (ref 0–0.5)
EOSINOPHIL NFR BLD AUTO: 1.5 % — SIGNIFICANT CHANGE UP (ref 0–6)
EOSINOPHIL NFR BLD AUTO: 1.5 % — SIGNIFICANT CHANGE UP (ref 0–6)
EOSINOPHIL NFR BLD AUTO: 2 % — SIGNIFICANT CHANGE UP (ref 0–6)
EOSINOPHIL NFR BLD AUTO: 2 % — SIGNIFICANT CHANGE UP (ref 0–6)
ERYTHROCYTE [SEDIMENTATION RATE] IN BLOOD: 5 MM/HR — SIGNIFICANT CHANGE UP
ERYTHROCYTE [SEDIMENTATION RATE] IN BLOOD: 5 MM/HR — SIGNIFICANT CHANGE UP
ESTIMATED AVERAGE GLUCOSE: 100 MG/DL — SIGNIFICANT CHANGE UP (ref 68–114)
ESTIMATED AVERAGE GLUCOSE: 100 MG/DL — SIGNIFICANT CHANGE UP (ref 68–114)
GLUCOSE SERPL-MCNC: 90 MG/DL — SIGNIFICANT CHANGE UP (ref 70–99)
GLUCOSE SERPL-MCNC: 90 MG/DL — SIGNIFICANT CHANGE UP (ref 70–99)
GLUCOSE UR QL: NEGATIVE MG/DL — SIGNIFICANT CHANGE UP
GLUCOSE UR QL: NEGATIVE MG/DL — SIGNIFICANT CHANGE UP
HCT VFR BLD CALC: 31.7 % — LOW (ref 34.5–45)
HCT VFR BLD CALC: 31.7 % — LOW (ref 34.5–45)
HCT VFR BLD CALC: 38.9 % — SIGNIFICANT CHANGE UP (ref 34.5–45)
HCT VFR BLD CALC: 38.9 % — SIGNIFICANT CHANGE UP (ref 34.5–45)
HCV AB S/CO SERPL IA: 0.05 S/CO — SIGNIFICANT CHANGE UP
HCV AB S/CO SERPL IA: 0.05 S/CO — SIGNIFICANT CHANGE UP
HCV AB SERPL-IMP: SIGNIFICANT CHANGE UP
HCV AB SERPL-IMP: SIGNIFICANT CHANGE UP
HGB BLD-MCNC: 10.5 G/DL — LOW (ref 11.5–15.5)
HGB BLD-MCNC: 10.5 G/DL — LOW (ref 11.5–15.5)
HGB BLD-MCNC: 13.1 G/DL — SIGNIFICANT CHANGE UP (ref 11.5–15.5)
HGB BLD-MCNC: 13.1 G/DL — SIGNIFICANT CHANGE UP (ref 11.5–15.5)
IMM GRANULOCYTES NFR BLD AUTO: 0.2 % — SIGNIFICANT CHANGE UP (ref 0–0.9)
INR BLD: 0.94 — SIGNIFICANT CHANGE UP (ref 0.85–1.18)
INR BLD: 0.94 — SIGNIFICANT CHANGE UP (ref 0.85–1.18)
KETONES UR-MCNC: NEGATIVE MG/DL — SIGNIFICANT CHANGE UP
KETONES UR-MCNC: NEGATIVE MG/DL — SIGNIFICANT CHANGE UP
LEUKOCYTE ESTERASE UR-ACNC: NEGATIVE — SIGNIFICANT CHANGE UP
LEUKOCYTE ESTERASE UR-ACNC: NEGATIVE — SIGNIFICANT CHANGE UP
LYMPHOCYTES # BLD AUTO: 1.46 K/UL — SIGNIFICANT CHANGE UP (ref 1–3.3)
LYMPHOCYTES # BLD AUTO: 1.46 K/UL — SIGNIFICANT CHANGE UP (ref 1–3.3)
LYMPHOCYTES # BLD AUTO: 1.81 K/UL — SIGNIFICANT CHANGE UP (ref 1–3.3)
LYMPHOCYTES # BLD AUTO: 1.81 K/UL — SIGNIFICANT CHANGE UP (ref 1–3.3)
LYMPHOCYTES # BLD AUTO: 36 % — SIGNIFICANT CHANGE UP (ref 13–44)
LYMPHOCYTES # BLD AUTO: 36 % — SIGNIFICANT CHANGE UP (ref 13–44)
LYMPHOCYTES # BLD AUTO: 41 % — SIGNIFICANT CHANGE UP (ref 13–44)
LYMPHOCYTES # BLD AUTO: 41 % — SIGNIFICANT CHANGE UP (ref 13–44)
MAGNESIUM SERPL-MCNC: 2 MG/DL — SIGNIFICANT CHANGE UP (ref 1.6–2.6)
MAGNESIUM SERPL-MCNC: 2 MG/DL — SIGNIFICANT CHANGE UP (ref 1.6–2.6)
MCHC RBC-ENTMCNC: 28.3 PG — SIGNIFICANT CHANGE UP (ref 27–34)
MCHC RBC-ENTMCNC: 28.3 PG — SIGNIFICANT CHANGE UP (ref 27–34)
MCHC RBC-ENTMCNC: 28.7 PG — SIGNIFICANT CHANGE UP (ref 27–34)
MCHC RBC-ENTMCNC: 28.7 PG — SIGNIFICANT CHANGE UP (ref 27–34)
MCHC RBC-ENTMCNC: 33.1 GM/DL — SIGNIFICANT CHANGE UP (ref 32–36)
MCHC RBC-ENTMCNC: 33.1 GM/DL — SIGNIFICANT CHANGE UP (ref 32–36)
MCHC RBC-ENTMCNC: 33.7 GM/DL — SIGNIFICANT CHANGE UP (ref 32–36)
MCHC RBC-ENTMCNC: 33.7 GM/DL — SIGNIFICANT CHANGE UP (ref 32–36)
MCV RBC AUTO: 85.1 FL — SIGNIFICANT CHANGE UP (ref 80–100)
MCV RBC AUTO: 85.1 FL — SIGNIFICANT CHANGE UP (ref 80–100)
MCV RBC AUTO: 85.4 FL — SIGNIFICANT CHANGE UP (ref 80–100)
MCV RBC AUTO: 85.4 FL — SIGNIFICANT CHANGE UP (ref 80–100)
MONOCYTES # BLD AUTO: 0.24 K/UL — SIGNIFICANT CHANGE UP (ref 0–0.9)
MONOCYTES # BLD AUTO: 0.24 K/UL — SIGNIFICANT CHANGE UP (ref 0–0.9)
MONOCYTES # BLD AUTO: 0.3 K/UL — SIGNIFICANT CHANGE UP (ref 0–0.9)
MONOCYTES # BLD AUTO: 0.3 K/UL — SIGNIFICANT CHANGE UP (ref 0–0.9)
MONOCYTES NFR BLD AUTO: 5.9 % — SIGNIFICANT CHANGE UP (ref 2–14)
MONOCYTES NFR BLD AUTO: 5.9 % — SIGNIFICANT CHANGE UP (ref 2–14)
MONOCYTES NFR BLD AUTO: 6.8 % — SIGNIFICANT CHANGE UP (ref 2–14)
MONOCYTES NFR BLD AUTO: 6.8 % — SIGNIFICANT CHANGE UP (ref 2–14)
NEUTROPHILS # BLD AUTO: 2.15 K/UL — SIGNIFICANT CHANGE UP (ref 1.8–7.4)
NEUTROPHILS # BLD AUTO: 2.15 K/UL — SIGNIFICANT CHANGE UP (ref 1.8–7.4)
NEUTROPHILS # BLD AUTO: 2.25 K/UL — SIGNIFICANT CHANGE UP (ref 1.8–7.4)
NEUTROPHILS # BLD AUTO: 2.25 K/UL — SIGNIFICANT CHANGE UP (ref 1.8–7.4)
NEUTROPHILS NFR BLD AUTO: 48.9 % — SIGNIFICANT CHANGE UP (ref 43–77)
NEUTROPHILS NFR BLD AUTO: 48.9 % — SIGNIFICANT CHANGE UP (ref 43–77)
NEUTROPHILS NFR BLD AUTO: 55.4 % — SIGNIFICANT CHANGE UP (ref 43–77)
NEUTROPHILS NFR BLD AUTO: 55.4 % — SIGNIFICANT CHANGE UP (ref 43–77)
NITRITE UR-MCNC: NEGATIVE — SIGNIFICANT CHANGE UP
NITRITE UR-MCNC: NEGATIVE — SIGNIFICANT CHANGE UP
NRBC # BLD: 0 /100 WBCS — SIGNIFICANT CHANGE UP (ref 0–0)
NT-PROBNP SERPL-SCNC: 1456 PG/ML — HIGH (ref 0–300)
NT-PROBNP SERPL-SCNC: 1456 PG/ML — HIGH (ref 0–300)
PH UR: 7.5 — SIGNIFICANT CHANGE UP (ref 5–8)
PH UR: 7.5 — SIGNIFICANT CHANGE UP (ref 5–8)
PLATELET # BLD AUTO: 242 K/UL — SIGNIFICANT CHANGE UP (ref 150–400)
PLATELET # BLD AUTO: 242 K/UL — SIGNIFICANT CHANGE UP (ref 150–400)
PLATELET # BLD AUTO: 296 K/UL — SIGNIFICANT CHANGE UP (ref 150–400)
PLATELET # BLD AUTO: 296 K/UL — SIGNIFICANT CHANGE UP (ref 150–400)
POTASSIUM SERPL-MCNC: 3.7 MMOL/L — SIGNIFICANT CHANGE UP (ref 3.5–5.3)
POTASSIUM SERPL-MCNC: 3.7 MMOL/L — SIGNIFICANT CHANGE UP (ref 3.5–5.3)
POTASSIUM SERPL-SCNC: 3.7 MMOL/L — SIGNIFICANT CHANGE UP (ref 3.5–5.3)
POTASSIUM SERPL-SCNC: 3.7 MMOL/L — SIGNIFICANT CHANGE UP (ref 3.5–5.3)
POTASSIUM UR-SCNC: 10 MMOL/L — SIGNIFICANT CHANGE UP
POTASSIUM UR-SCNC: 10 MMOL/L — SIGNIFICANT CHANGE UP
PROT SERPL-MCNC: 7.1 G/DL — SIGNIFICANT CHANGE UP (ref 6–8.3)
PROT SERPL-MCNC: 7.1 G/DL — SIGNIFICANT CHANGE UP (ref 6–8.3)
PROT UR-MCNC: NEGATIVE MG/DL — SIGNIFICANT CHANGE UP
PROT UR-MCNC: NEGATIVE MG/DL — SIGNIFICANT CHANGE UP
PROTHROM AB SERPL-ACNC: 10.7 SEC — SIGNIFICANT CHANGE UP (ref 9.5–13)
PROTHROM AB SERPL-ACNC: 10.7 SEC — SIGNIFICANT CHANGE UP (ref 9.5–13)
RAPID RVP RESULT: SIGNIFICANT CHANGE UP
RAPID RVP RESULT: SIGNIFICANT CHANGE UP
RBC # BLD: 3.71 M/UL — LOW (ref 3.8–5.2)
RBC # BLD: 3.71 M/UL — LOW (ref 3.8–5.2)
RBC # BLD: 4.57 M/UL — SIGNIFICANT CHANGE UP (ref 3.8–5.2)
RBC # BLD: 4.57 M/UL — SIGNIFICANT CHANGE UP (ref 3.8–5.2)
RBC # FLD: 12.4 % — SIGNIFICANT CHANGE UP (ref 10.3–14.5)
SARS-COV-2 RNA SPEC QL NAA+PROBE: SIGNIFICANT CHANGE UP
SARS-COV-2 RNA SPEC QL NAA+PROBE: SIGNIFICANT CHANGE UP
SODIUM SERPL-SCNC: 132 MMOL/L — LOW (ref 135–145)
SODIUM SERPL-SCNC: 132 MMOL/L — LOW (ref 135–145)
SODIUM UR-SCNC: 72 MMOL/L — SIGNIFICANT CHANGE UP
SODIUM UR-SCNC: 72 MMOL/L — SIGNIFICANT CHANGE UP
SP GR SPEC: 1.01 — SIGNIFICANT CHANGE UP (ref 1–1.03)
SP GR SPEC: 1.01 — SIGNIFICANT CHANGE UP (ref 1–1.03)
TROPONIN T, HIGH SENSITIVITY RESULT: 195 NG/L — CRITICAL HIGH (ref 0–51)
TROPONIN T, HIGH SENSITIVITY RESULT: 195 NG/L — CRITICAL HIGH (ref 0–51)
TROPONIN T, HIGH SENSITIVITY RESULT: 95 NG/L — CRITICAL HIGH (ref 0–51)
TROPONIN T, HIGH SENSITIVITY RESULT: 95 NG/L — CRITICAL HIGH (ref 0–51)
TSH SERPL-MCNC: 1.48 UIU/ML — SIGNIFICANT CHANGE UP (ref 0.27–4.2)
TSH SERPL-MCNC: 1.48 UIU/ML — SIGNIFICANT CHANGE UP (ref 0.27–4.2)
UROBILINOGEN FLD QL: 0.2 MG/DL — SIGNIFICANT CHANGE UP (ref 0.2–1)
UROBILINOGEN FLD QL: 0.2 MG/DL — SIGNIFICANT CHANGE UP (ref 0.2–1)
WBC # BLD: 4.06 K/UL — SIGNIFICANT CHANGE UP (ref 3.8–10.5)
WBC # BLD: 4.06 K/UL — SIGNIFICANT CHANGE UP (ref 3.8–10.5)
WBC # BLD: 4.41 K/UL — SIGNIFICANT CHANGE UP (ref 3.8–10.5)
WBC # BLD: 4.41 K/UL — SIGNIFICANT CHANGE UP (ref 3.8–10.5)
WBC # FLD AUTO: 4.06 K/UL — SIGNIFICANT CHANGE UP (ref 3.8–10.5)
WBC # FLD AUTO: 4.06 K/UL — SIGNIFICANT CHANGE UP (ref 3.8–10.5)
WBC # FLD AUTO: 4.41 K/UL — SIGNIFICANT CHANGE UP (ref 3.8–10.5)
WBC # FLD AUTO: 4.41 K/UL — SIGNIFICANT CHANGE UP (ref 3.8–10.5)

## 2023-12-20 PROCEDURE — 99152 MOD SED SAME PHYS/QHP 5/>YRS: CPT

## 2023-12-20 PROCEDURE — 93306 TTE W/DOPPLER COMPLETE: CPT | Mod: 26

## 2023-12-20 PROCEDURE — 93458 L HRT ARTERY/VENTRICLE ANGIO: CPT | Mod: 26

## 2023-12-20 RX ORDER — METOPROLOL TARTRATE 50 MG
25 TABLET ORAL DAILY
Refills: 0 | Status: DISCONTINUED | OUTPATIENT
Start: 2023-12-20 | End: 2023-12-21

## 2023-12-20 RX ORDER — CLOPIDOGREL BISULFATE 75 MG/1
600 TABLET, FILM COATED ORAL ONCE
Refills: 0 | Status: COMPLETED | OUTPATIENT
Start: 2023-12-20 | End: 2023-12-20

## 2023-12-20 RX ORDER — ENOXAPARIN SODIUM 100 MG/ML
40 INJECTION SUBCUTANEOUS EVERY 24 HOURS
Refills: 0 | Status: DISCONTINUED | OUTPATIENT
Start: 2023-12-20 | End: 2023-12-20

## 2023-12-20 RX ORDER — HEPARIN SODIUM 5000 [USP'U]/ML
INJECTION INTRAVENOUS; SUBCUTANEOUS
Qty: 25000 | Refills: 0 | Status: DISCONTINUED | OUTPATIENT
Start: 2023-12-20 | End: 2023-12-20

## 2023-12-20 RX ORDER — POTASSIUM CHLORIDE 20 MEQ
40 PACKET (EA) ORAL ONCE
Refills: 0 | Status: COMPLETED | OUTPATIENT
Start: 2023-12-20 | End: 2023-12-20

## 2023-12-20 RX ORDER — CLONAZEPAM 1 MG
0.5 TABLET ORAL ONCE
Refills: 0 | Status: DISCONTINUED | OUTPATIENT
Start: 2023-12-20 | End: 2023-12-20

## 2023-12-20 RX ORDER — SODIUM CHLORIDE 9 MG/ML
500 INJECTION INTRAMUSCULAR; INTRAVENOUS; SUBCUTANEOUS
Refills: 0 | Status: DISCONTINUED | OUTPATIENT
Start: 2023-12-20 | End: 2023-12-21

## 2023-12-20 RX ADMIN — OXYCODONE AND ACETAMINOPHEN 1 TABLET(S): 5; 325 TABLET ORAL at 21:24

## 2023-12-20 RX ADMIN — SODIUM CHLORIDE 100 MILLILITER(S): 9 INJECTION INTRAMUSCULAR; INTRAVENOUS; SUBCUTANEOUS at 15:40

## 2023-12-20 RX ADMIN — PANTOPRAZOLE SODIUM 40 MILLIGRAM(S): 20 TABLET, DELAYED RELEASE ORAL at 07:09

## 2023-12-20 RX ADMIN — OXYCODONE AND ACETAMINOPHEN 1 TABLET(S): 5; 325 TABLET ORAL at 10:53

## 2023-12-20 RX ADMIN — Medication 40 MILLIEQUIVALENT(S): at 09:52

## 2023-12-20 RX ADMIN — GABAPENTIN 800 MILLIGRAM(S): 400 CAPSULE ORAL at 11:04

## 2023-12-20 RX ADMIN — Medication 0.5 MILLIGRAM(S): at 02:58

## 2023-12-20 RX ADMIN — OXCARBAZEPINE 300 MILLIGRAM(S): 300 TABLET, FILM COATED ORAL at 07:09

## 2023-12-20 RX ADMIN — HEPARIN SODIUM 650 UNIT(S)/HR: 5000 INJECTION INTRAVENOUS; SUBCUTANEOUS at 08:24

## 2023-12-20 RX ADMIN — CLOPIDOGREL BISULFATE 600 MILLIGRAM(S): 75 TABLET, FILM COATED ORAL at 11:04

## 2023-12-20 RX ADMIN — GABAPENTIN 800 MILLIGRAM(S): 400 CAPSULE ORAL at 07:09

## 2023-12-20 RX ADMIN — OXYCODONE AND ACETAMINOPHEN 1 TABLET(S): 5; 325 TABLET ORAL at 09:53

## 2023-12-20 RX ADMIN — OXYCODONE AND ACETAMINOPHEN 1 TABLET(S): 5; 325 TABLET ORAL at 22:23

## 2023-12-20 RX ADMIN — HEPARIN SODIUM 750 UNIT(S)/HR: 5000 INJECTION INTRAVENOUS; SUBCUTANEOUS at 09:53

## 2023-12-20 RX ADMIN — OXYCODONE AND ACETAMINOPHEN 1 TABLET(S): 5; 325 TABLET ORAL at 00:55

## 2023-12-20 RX ADMIN — OXYCODONE AND ACETAMINOPHEN 1 TABLET(S): 5; 325 TABLET ORAL at 17:13

## 2023-12-20 RX ADMIN — GABAPENTIN 800 MILLIGRAM(S): 400 CAPSULE ORAL at 00:37

## 2023-12-20 RX ADMIN — OXCARBAZEPINE 300 MILLIGRAM(S): 300 TABLET, FILM COATED ORAL at 17:12

## 2023-12-20 RX ADMIN — Medication 81 MILLIGRAM(S): at 11:04

## 2023-12-20 RX ADMIN — GABAPENTIN 800 MILLIGRAM(S): 400 CAPSULE ORAL at 17:13

## 2023-12-20 RX ADMIN — HEPARIN SODIUM 650 UNIT(S)/HR: 5000 INJECTION INTRAVENOUS; SUBCUTANEOUS at 03:08

## 2023-12-20 RX ADMIN — SODIUM CHLORIDE 75 MILLILITER(S): 9 INJECTION INTRAMUSCULAR; INTRAVENOUS; SUBCUTANEOUS at 00:37

## 2023-12-20 RX ADMIN — Medication 25 MILLIGRAM(S): at 21:19

## 2023-12-20 NOTE — PROGRESS NOTE ADULT - SUBJECTIVE AND OBJECTIVE BOX
Cardiology PA Progress Note    S: Pt seen and examined bedside.  Patient denies C/P, SOB, N/V, dizziness, palpitations, and diaphoresis.  Pt denies fever/chills, dysuria, abdominal pain, diarrhea, and cough  12 Point ROS otherwise negative except as per HPI/subjective.     O: Vital Signs Last 24 Hrs  T(C): 36.3 (20 Dec 2023 16:11), Max: 37 (20 Dec 2023 13:00)  T(F): 97.3 (20 Dec 2023 16:11), Max: 98.6 (20 Dec 2023 13:00)  HR: 77 (20 Dec 2023 17:11) (62 - 77)  BP: 140/70 (20 Dec 2023 17:11) (140/70 - 149/84)  BP(mean): 108 (20 Dec 2023 07:04) (101 - 110)  RR: 18 (20 Dec 2023 17:11) (16 - 18)  SpO2: 100% (20 Dec 2023 17:11) (97% - 100%)    Parameters below as of 20 Dec 2023 17:11  Patient On (Oxygen Delivery Method): room air    PHYSICAL EXAM:  GEN: NAD  HEENT: No JVD  PULM:  CTA B/L  CARD:  RRR, S1 and S2, radial pulses 2+ b/l, femoral pulses 2+ b/l w/o bruits b/l, DP pulses 1+ b/l, PT pulses 1+ b/l  ABD: +BS, NT, soft/ND	  EXT: No Edema B/L LE  NEURO: A+Ox3, no focal deficit  PSYCH: Mood Appropriate    LABS:                        13.1   4.06  )-----------( 296      ( 20 Dec 2023 07:47 )             38.9     12-20    132<L>  |  96  |  6<L>  ----------------------------<  90  3.7   |  27  |  0.55    Ca    9.9      20 Dec 2023 07:47  Mg     2.0     12-20    TPro  7.1  /  Alb  4.5  /  TBili  0.4  /  DBili  x   /  AST  31  /  ALT  36  /  AlkPhos  127<H>  12-20    PT/INR - ( 20 Dec 2023 07:47 )   PT: 10.7 sec;   INR: 0.94          PTT - ( 20 Dec 2023 07:47 )  PTT:46.6 sec      12-19 @ 07:01  -  12-20 @ 07:00  --------------------------------------------------------  IN: 551 mL / OUT: 2100 mL / NET: -1549 mL    12-20 @ 07:01  -  12-20 @ 20:13  --------------------------------------------------------  IN: 843 mL / OUT: 100 mL / NET: 743 mL      Daily     Daily
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
## 2023-12-20 NOTE — CHART NOTE - NSCHARTNOTEFT_GEN_A_CORE
Precath Checklist: Cardiac Catheterization 2023      Allergies: No Known Allergies  Shellfish/Contrast Allergies No    PULSES:	   B	           Radial       FEM         	           DP/PT  Right	2+	           1+             2+ (no bruit b/l)     	1+/1+     Left	2+	            1+            2+ (no bruit b/l)                   1+/1+                          13.1   4.06  )-----------( 296      ( 20 Dec 2023 07:47 )             38.9         132<L>  |  96  |  6<L>  ----------------------------<  90  3.7   |  27  |  0.55    Ca    9.9      20 Dec 2023 07:47  Mg     2.0         TPro  7.1  /  Alb  4.5  /  TBili  0.4  /  DBili  x   /  AST  31  /  ALT  36  /  AlkPhos  127<H>      CARDIAC MARKERS ( 20 Dec 2023 07:47 )  x     / x     / 94 U/L / x     / 6.1 ng/mL  CARDIAC MARKERS ( 20 Dec 2023 00:53 )  x     / x     / 101 U/L / x     / 7.3 ng/mL    Prothrombin Time, Plasma: 10.7 sec [9.5 - 13.0] (23 @ 07:47)  INR: 0.94 [0.85 - 1.18] (23 @ 07:47)  Activated Partial Thromboplastin Time: 46.6 sec [24.5 - 35.6] (23 @ 07:47)  Activated Partial Thromboplastin Time: 53.7 sec [24.5 - 35.6] (23 @ 05:30)  Activated Partial Thromboplastin Time: 41.2 sec [24.5 - 35.6] (23 @ 01:27)      EK Lead PENDING for today--will follow up	    ASA: II			Mallampati class: II            Anginal Class: Unstable angina      Sedation Plan:  Moderate    Patient Is Suitable Candidate For Sedation?  Yes  Cath Order Placed: Yes  CARDIAC CATH: Risks & benefits of procedure and sedation and risks and benefits for the alternative therapy have been explained to the patient and/or HCP in layman’s terms including but not limited to: allergic reaction, bleeding, infection, arrhythmia, respiratory compromise, renal and vascular compromise, limb damage, MI, CVA, emergent CABG/Vascular Surgery and death. Informed consent obtained and in chart.  -Precath/cosented: Yes, in chart  -FLUIDS: EF unknown   -DAPT: s/p  mg x one dose . Ordered for ASA 81 mg daily today. Will order Plavix 600 mg x one dose  -LABS:   -NPO: Yes after midnight Precath Checklist: Cardiac Catheterization 2023      Allergies: No Known Allergies  Shellfish/Contrast Allergies No    PULSES:	   B	           Radial       FEM         	           DP/PT  Right	2+	           1+             2+ (no bruit b/l)     	1+/1+     Left	2+	            1+            2+ (no bruit b/l)                   1+/1+                          13.1   4.06  )-----------( 296      ( 20 Dec 2023 07:47 )             38.9         132<L>  |  96  |  6<L>  ----------------------------<  90  3.7   |  27  |  0.55    Ca    9.9      20 Dec 2023 07:47  Mg     2.0         TPro  7.1  /  Alb  4.5  /  TBili  0.4  /  DBili  x   /  AST  31  /  ALT  36  /  AlkPhos  127<H>      CARDIAC MARKERS ( 20 Dec 2023 07:47 )  x     / x     / 94 U/L / x     / 6.1 ng/mL  CARDIAC MARKERS ( 20 Dec 2023 00:53 )  x     / x     / 101 U/L / x     / 7.3 ng/mL    Prothrombin Time, Plasma: 10.7 sec [9.5 - 13.0] (23 @ 07:47)  INR: 0.94 [0.85 - 1.18] (23 @ 07:47)  Activated Partial Thromboplastin Time: 46.6 sec [24.5 - 35.6] (23 @ 07:47)  Activated Partial Thromboplastin Time: 53.7 sec [24.5 - 35.6] (23 @ 05:30)  Activated Partial Thromboplastin Time: 41.2 sec [24.5 - 35.6] (23 @ 01:27)      EK Lead PENDING for today--will follow up	    ASA: II			Mallampati class: II            Anginal Class: Unstable angina      Sedation Plan:  Moderate    Patient Is Suitable Candidate For Sedation?  Yes  Cath Order Placed: Yes  CARDIAC CATH: Risks & benefits of procedure and sedation and risks and benefits for the alternative therapy have been explained to the patient and/or HCP in layman’s terms including but not limited to: allergic reaction, bleeding, infection, arrhythmia, respiratory compromise, renal and vascular compromise, limb damage, MI, CVA, emergent CABG/Vascular Surgery and death. Informed consent obtained and in chart.  -Precath/cosented: Yes, in chart  -FLUIDS: EF unknown, s/p 1 L bolus in ED overnight. NS @ 75 cc/hour x 12 hour initiated overnight.   -DAPT: s/p  mg x one dose . Ordered for ASA 81 mg daily today. Will order Plavix 600 mg x one dose (discussed importance of compliance with patient)  -NPO: Yes after midnight Precath Checklist: Cardiac Catheterization 2023      Allergies: No Known Allergies  Shellfish/Contrast Allergies No    PULSES:	   B	           Radial       FEM         	           DP/PT  Right	2+	           1+             2+ (no bruit b/l)     	1+/1+     Left	2+	            1+            2+ (no bruit b/l)                   1+/1+                          13.1   4.06  )-----------( 296      ( 20 Dec 2023 07:47 )             38.9         132<L>  |  96  |  6<L>  ----------------------------<  90  3.7   |  27  |  0.55    Ca    9.9      20 Dec 2023 07:47  Mg     2.0         TPro  7.1  /  Alb  4.5  /  TBili  0.4  /  DBili  x   /  AST  31  /  ALT  36  /  AlkPhos  127<H>      CARDIAC MARKERS ( 20 Dec 2023 07:47 )  x     / x     / 94 U/L / x     / 6.1 ng/mL  CARDIAC MARKERS ( 20 Dec 2023 00:53 )  x     / x     / 101 U/L / x     / 7.3 ng/mL    Prothrombin Time, Plasma: 10.7 sec [9.5 - 13.0] (23 @ 07:47)  INR: 0.94 [0.85 - 1.18] (23 @ 07:47)  Activated Partial Thromboplastin Time: 46.6 sec [24.5 - 35.6] (23 @ 07:47)  Activated Partial Thromboplastin Time: 53.7 sec [24.5 - 35.6] (23 @ 05:30)  Activated Partial Thromboplastin Time: 41.2 sec [24.5 - 35.6] (23 @ 01:27)      EK Lead PENDING for today--will follow up	    ASA: II			Mallampati class: II            Anginal Class: Unstable angina    -Sedation Plan:  Moderate    -Patient Is Suitable Candidate For Sedation?  Yes  -Cath Order Placed: Yes  -CARDIAC CATH: Risks & benefits of procedure and sedation and risks and benefits for the alternative therapy have been explained to the patient and/or HCP in layman’s terms including but not limited to: allergic reaction, bleeding, infection, arrhythmia, respiratory compromise, renal and vascular compromise, limb damage, MI, CVA, emergent CABG/Vascular Surgery and death. Informed consent obtained and in chart.  -Precath/cosented: Yes  -FLUIDS: EF unknown, s/p 1 L bolus in ED overnight. NS @ 75 cc/hour x 12 hour initiated overnight.   -DAPT: s/p  mg x one dose . Ordered for ASA 81 mg daily today. Will order Plavix 600 mg x one dose (discussed importance of compliance with patient)  -NPO: Yes after midnight

## 2023-12-20 NOTE — PATIENT PROFILE ADULT - CAREGIVER ADDRESS
[id number] [place] [address],  [place], [address] [id number] [place] [address],  [place], [address]

## 2023-12-20 NOTE — PATIENT PROFILE ADULT - FALL HARM RISK - HARM RISK INTERVENTIONS
[FreeTextEntry1] : The patient is a 64 year old gentleman with history of HTN, alcoholism, recurrent falls with associated trauma s/p left lower extremity amputation, CHF with preserved LV function, and long-standing persistent atrial fibrillation. Due to his recurrent traumatic falls, gait instability, and alcohol abuse has been off anticoagulation, and has been considered a poor candidate for long-term anticoagulation despite elevated risk for thromboembolic complications associated with ongoing persistent AF, with CHADSVASc score of 4 (HTN, CHF, and prior CVA seen on neuroimaging). \par He is now s/p Watchman left atrial appendage occlusion procedure. MYA visualized well seated device.  He has been maintained on Xarelto. He has tolerated anticoagulation well and denies any recent falls.  Communicate Risk of Fall with Harm to all staff/Reinforce activity limits and safety measures with patient and family/Tailored Fall Risk Interventions/Visual Cue: Yellow wristband and red socks/Bed in lowest position, wheels locked, appropriate side rails in place/Call bell, personal items and telephone in reach/Instruct patient to call for assistance before getting out of bed or chair/Non-slip footwear when patient is out of bed/[place] to call system/Physically safe environment - no spills, clutter or unnecessary equipment/Purposeful Proactive Rounding/Room/bathroom lighting operational, light cord in reach Communicate Risk of Fall with Harm to all staff/Reinforce activity limits and safety measures with patient and family/Tailored Fall Risk Interventions/Visual Cue: Yellow wristband and red socks/Bed in lowest position, wheels locked, appropriate side rails in place/Call bell, personal items and telephone in reach/Instruct patient to call for assistance before getting out of bed or chair/Non-slip footwear when patient is out of bed/[place] to call system/Physically safe environment - no spills, clutter or unnecessary equipment/Purposeful Proactive Rounding/Room/bathroom lighting operational, light cord in reach gait: not tested

## 2023-12-20 NOTE — PROGRESS NOTE ADULT - PROBLEM SELECTOR PLAN 7
DVT ppx: SCDs b/l  F: pt tolerating PO   E: Keep K > 4, Mg > 2  N: DASH/TLC     Code: Full  Dispo: pending clinical progression     Case discussed with Dr. Wilkinson.

## 2023-12-20 NOTE — PROGRESS NOTE ADULT - ASSESSMENT
62 y/o female with PMHx  of  Depression/Anxiety, Hypertension (not on medication),  Neuropathy, chronic chest/abdominal pain (pt. is followed by Neurologist and Pain Management; recently tried Ketamine therapy), IBS,  Hyponatremia presents to [facility] ER this evening, 12/19/23, complaining of worsening midsternal chest pain, described as "sharp" radiating downward to lower abdomen with HA one episode of nbnb emesis this evening and reports diarrhea for one month.    62 y/o female with PMHx  of  Depression/Anxiety, Hypertension (not on medication),  Neuropathy, chronic chest/abdominal pain (pt. is followed by Neurologist and Pain Management; recently tried Ketamine therapy), IBS,  Hyponatremia presents to [facility] ER this evening, 12/19/23, complaining of worsening midsternal chest pain, described as "sharp" radiating downward to lower abdomen with HA one episode of nbnb emesis this evening and reports diarrhea for one month.    64 y/o female with PMHx  of  Depression/Anxiety, Hypertension (not on medication),  Neuropathy, chronic chest/abdominal pain (pt. is followed by Neurologist and Pain Management; recently tried Ketamine therapy), IBS,  Hyponatremia presents to [facility] ER this evening, 12/19/23, complaining of worsening midsternal chest pain, described as "sharp" radiating downward to lower abdomen with HA one episode of nbnb emesis this evening and reports diarrhea for one month.    64 y/o female with PMHx  of  Depression/Anxiety, Hypertension (not on medication),  Neuropathy, chronic chest/abdominal pain (pt. is followed by Neurologist and Pain Management; recently tried Ketamine therapy), IBS,  Hyponatremia presents to [facility] ER this evening, 12/19/23, complaining of worsening midsternal chest pain, described as "sharp" radiating downward to lower abdomen with HA one episode of nbnb emesis this evening and reports diarrhea for one month.

## 2023-12-20 NOTE — PATIENT PROFILE ADULT - FUNCTIONAL ASSESSMENT - BASIC MOBILITY 6.
4-calculated by average/Not able to assess (calculate score using [facility] averaging method)  4-calculated by average/Not able to assess (calculate score using [facility] averaging method)

## 2023-12-20 NOTE — PROGRESS NOTE ADULT - PROBLEM SELECTOR PLAN 1
ECG reveals NS@68bpm with TWI in leads V1-V2 (no comparision); Troponin T -->195  - ECHO (12/20/23): EF 55%, mild symmetric LVH, moderate pericardial effusion w/o tamponade  - LHC (12/20/23): normal coronaries. Focal anterior wall takotsubo; myocardial bridging; EF 45-50% by echo. EDP 18 mmHg; Access: right radial.   - s/p  POx1, Plavix 600 mg POx1, s/p heparin gtt  - Continue: ASA 81 mg QD, consider statin in AM   - Initiate: Toprol XL 25 mg QD

## 2023-12-21 ENCOUNTER — TRANSCRIPTION ENCOUNTER (OUTPATIENT)
Age: 63
End: 2023-12-21

## 2023-12-21 VITALS
HEART RATE: 78 BPM | OXYGEN SATURATION: 99 % | TEMPERATURE: 98 F | RESPIRATION RATE: 18 BRPM | DIASTOLIC BLOOD PRESSURE: 82 MMHG | SYSTOLIC BLOOD PRESSURE: 135 MMHG

## 2023-12-21 LAB
ALBUMIN SERPL ELPH-MCNC: 4.5 G/DL — SIGNIFICANT CHANGE UP (ref 3.3–5)
ALBUMIN SERPL ELPH-MCNC: 4.5 G/DL — SIGNIFICANT CHANGE UP (ref 3.3–5)
ALP SERPL-CCNC: 121 U/L — HIGH (ref 40–120)
ALP SERPL-CCNC: 121 U/L — HIGH (ref 40–120)
ALT FLD-CCNC: 35 U/L — SIGNIFICANT CHANGE UP (ref 10–45)
ALT FLD-CCNC: 35 U/L — SIGNIFICANT CHANGE UP (ref 10–45)
ANION GAP SERPL CALC-SCNC: 9 MMOL/L — SIGNIFICANT CHANGE UP (ref 5–17)
ANION GAP SERPL CALC-SCNC: 9 MMOL/L — SIGNIFICANT CHANGE UP (ref 5–17)
AST SERPL-CCNC: 29 U/L — SIGNIFICANT CHANGE UP (ref 10–40)
AST SERPL-CCNC: 29 U/L — SIGNIFICANT CHANGE UP (ref 10–40)
BASOPHILS # BLD AUTO: 0.05 K/UL — SIGNIFICANT CHANGE UP (ref 0–0.2)
BASOPHILS # BLD AUTO: 0.05 K/UL — SIGNIFICANT CHANGE UP (ref 0–0.2)
BASOPHILS NFR BLD AUTO: 1 % — SIGNIFICANT CHANGE UP (ref 0–2)
BASOPHILS NFR BLD AUTO: 1 % — SIGNIFICANT CHANGE UP (ref 0–2)
BILIRUB SERPL-MCNC: 0.3 MG/DL — SIGNIFICANT CHANGE UP (ref 0.2–1.2)
BILIRUB SERPL-MCNC: 0.3 MG/DL — SIGNIFICANT CHANGE UP (ref 0.2–1.2)
BUN SERPL-MCNC: 10 MG/DL — SIGNIFICANT CHANGE UP (ref 7–23)
BUN SERPL-MCNC: 10 MG/DL — SIGNIFICANT CHANGE UP (ref 7–23)
CALCIUM SERPL-MCNC: 10 MG/DL — SIGNIFICANT CHANGE UP (ref 8.4–10.5)
CALCIUM SERPL-MCNC: 10 MG/DL — SIGNIFICANT CHANGE UP (ref 8.4–10.5)
CHLORIDE SERPL-SCNC: 100 MMOL/L — SIGNIFICANT CHANGE UP (ref 96–108)
CHLORIDE SERPL-SCNC: 100 MMOL/L — SIGNIFICANT CHANGE UP (ref 96–108)
CO2 SERPL-SCNC: 25 MMOL/L — SIGNIFICANT CHANGE UP (ref 22–31)
CO2 SERPL-SCNC: 25 MMOL/L — SIGNIFICANT CHANGE UP (ref 22–31)
CREAT SERPL-MCNC: 0.57 MG/DL — SIGNIFICANT CHANGE UP (ref 0.5–1.3)
CREAT SERPL-MCNC: 0.57 MG/DL — SIGNIFICANT CHANGE UP (ref 0.5–1.3)
EGFR: 103 ML/MIN/1.73M2 — SIGNIFICANT CHANGE UP
EGFR: 103 ML/MIN/1.73M2 — SIGNIFICANT CHANGE UP
EOSINOPHIL # BLD AUTO: 0.1 K/UL — SIGNIFICANT CHANGE UP (ref 0–0.5)
EOSINOPHIL # BLD AUTO: 0.1 K/UL — SIGNIFICANT CHANGE UP (ref 0–0.5)
EOSINOPHIL NFR BLD AUTO: 1.9 % — SIGNIFICANT CHANGE UP (ref 0–6)
EOSINOPHIL NFR BLD AUTO: 1.9 % — SIGNIFICANT CHANGE UP (ref 0–6)
GLUCOSE SERPL-MCNC: 94 MG/DL — SIGNIFICANT CHANGE UP (ref 70–99)
GLUCOSE SERPL-MCNC: 94 MG/DL — SIGNIFICANT CHANGE UP (ref 70–99)
HCT VFR BLD CALC: 40.3 % — SIGNIFICANT CHANGE UP (ref 34.5–45)
HCT VFR BLD CALC: 40.3 % — SIGNIFICANT CHANGE UP (ref 34.5–45)
HGB BLD-MCNC: 13.3 G/DL — SIGNIFICANT CHANGE UP (ref 11.5–15.5)
HGB BLD-MCNC: 13.3 G/DL — SIGNIFICANT CHANGE UP (ref 11.5–15.5)
IMM GRANULOCYTES NFR BLD AUTO: 0.4 % — SIGNIFICANT CHANGE UP (ref 0–0.9)
IMM GRANULOCYTES NFR BLD AUTO: 0.4 % — SIGNIFICANT CHANGE UP (ref 0–0.9)
LYMPHOCYTES # BLD AUTO: 1.09 K/UL — SIGNIFICANT CHANGE UP (ref 1–3.3)
LYMPHOCYTES # BLD AUTO: 1.09 K/UL — SIGNIFICANT CHANGE UP (ref 1–3.3)
LYMPHOCYTES # BLD AUTO: 20.8 % — SIGNIFICANT CHANGE UP (ref 13–44)
LYMPHOCYTES # BLD AUTO: 20.8 % — SIGNIFICANT CHANGE UP (ref 13–44)
MAGNESIUM SERPL-MCNC: 2.3 MG/DL — SIGNIFICANT CHANGE UP (ref 1.6–2.6)
MAGNESIUM SERPL-MCNC: 2.3 MG/DL — SIGNIFICANT CHANGE UP (ref 1.6–2.6)
MCHC RBC-ENTMCNC: 28.7 PG — SIGNIFICANT CHANGE UP (ref 27–34)
MCHC RBC-ENTMCNC: 28.7 PG — SIGNIFICANT CHANGE UP (ref 27–34)
MCHC RBC-ENTMCNC: 33 GM/DL — SIGNIFICANT CHANGE UP (ref 32–36)
MCHC RBC-ENTMCNC: 33 GM/DL — SIGNIFICANT CHANGE UP (ref 32–36)
MCV RBC AUTO: 87 FL — SIGNIFICANT CHANGE UP (ref 80–100)
MCV RBC AUTO: 87 FL — SIGNIFICANT CHANGE UP (ref 80–100)
MONOCYTES # BLD AUTO: 0.34 K/UL — SIGNIFICANT CHANGE UP (ref 0–0.9)
MONOCYTES # BLD AUTO: 0.34 K/UL — SIGNIFICANT CHANGE UP (ref 0–0.9)
MONOCYTES NFR BLD AUTO: 6.5 % — SIGNIFICANT CHANGE UP (ref 2–14)
MONOCYTES NFR BLD AUTO: 6.5 % — SIGNIFICANT CHANGE UP (ref 2–14)
NEUTROPHILS # BLD AUTO: 3.65 K/UL — SIGNIFICANT CHANGE UP (ref 1.8–7.4)
NEUTROPHILS # BLD AUTO: 3.65 K/UL — SIGNIFICANT CHANGE UP (ref 1.8–7.4)
NEUTROPHILS NFR BLD AUTO: 69.4 % — SIGNIFICANT CHANGE UP (ref 43–77)
NEUTROPHILS NFR BLD AUTO: 69.4 % — SIGNIFICANT CHANGE UP (ref 43–77)
NIGHT BLUE STAIN TISS: SIGNIFICANT CHANGE UP
NIGHT BLUE STAIN TISS: SIGNIFICANT CHANGE UP
NRBC # BLD: 0 /100 WBCS — SIGNIFICANT CHANGE UP (ref 0–0)
NRBC # BLD: 0 /100 WBCS — SIGNIFICANT CHANGE UP (ref 0–0)
PLATELET # BLD AUTO: 291 K/UL — SIGNIFICANT CHANGE UP (ref 150–400)
PLATELET # BLD AUTO: 291 K/UL — SIGNIFICANT CHANGE UP (ref 150–400)
POTASSIUM SERPL-MCNC: 4.4 MMOL/L — SIGNIFICANT CHANGE UP (ref 3.5–5.3)
POTASSIUM SERPL-MCNC: 4.4 MMOL/L — SIGNIFICANT CHANGE UP (ref 3.5–5.3)
POTASSIUM SERPL-SCNC: 4.4 MMOL/L — SIGNIFICANT CHANGE UP (ref 3.5–5.3)
POTASSIUM SERPL-SCNC: 4.4 MMOL/L — SIGNIFICANT CHANGE UP (ref 3.5–5.3)
PROT SERPL-MCNC: 7.3 G/DL — SIGNIFICANT CHANGE UP (ref 6–8.3)
PROT SERPL-MCNC: 7.3 G/DL — SIGNIFICANT CHANGE UP (ref 6–8.3)
RBC # BLD: 4.63 M/UL — SIGNIFICANT CHANGE UP (ref 3.8–5.2)
RBC # BLD: 4.63 M/UL — SIGNIFICANT CHANGE UP (ref 3.8–5.2)
RBC # FLD: 12.6 % — SIGNIFICANT CHANGE UP (ref 10.3–14.5)
RBC # FLD: 12.6 % — SIGNIFICANT CHANGE UP (ref 10.3–14.5)
SODIUM SERPL-SCNC: 134 MMOL/L — LOW (ref 135–145)
SODIUM SERPL-SCNC: 134 MMOL/L — LOW (ref 135–145)
SPECIMEN SOURCE: SIGNIFICANT CHANGE UP
SPECIMEN SOURCE: SIGNIFICANT CHANGE UP
WBC # BLD: 5.25 K/UL — SIGNIFICANT CHANGE UP (ref 3.8–10.5)
WBC # BLD: 5.25 K/UL — SIGNIFICANT CHANGE UP (ref 3.8–10.5)
WBC # FLD AUTO: 5.25 K/UL — SIGNIFICANT CHANGE UP (ref 3.8–10.5)
WBC # FLD AUTO: 5.25 K/UL — SIGNIFICANT CHANGE UP (ref 3.8–10.5)

## 2023-12-21 PROCEDURE — 99239 HOSP IP/OBS DSCHRG MGMT >30: CPT

## 2023-12-21 RX ORDER — METOPROLOL TARTRATE 50 MG
1 TABLET ORAL
Qty: 30 | Refills: 2
Start: 2023-12-21 | End: 2024-03-19

## 2023-12-21 RX ORDER — CLONAZEPAM 1 MG
0.5 TABLET ORAL ONCE
Refills: 0 | Status: DISCONTINUED | OUTPATIENT
Start: 2023-12-21 | End: 2023-12-21

## 2023-12-21 RX ORDER — AMLODIPINE BESYLATE 2.5 MG/1
5 TABLET ORAL ONCE
Refills: 0 | Status: COMPLETED | OUTPATIENT
Start: 2023-12-21 | End: 2023-12-21

## 2023-12-21 RX ORDER — AMLODIPINE BESYLATE 2.5 MG/1
1 TABLET ORAL
Qty: 1 | Refills: 2
Start: 2023-12-21 | End: 2024-03-19

## 2023-12-21 RX ADMIN — Medication 0.5 MILLIGRAM(S): at 00:40

## 2023-12-21 RX ADMIN — AMLODIPINE BESYLATE 5 MILLIGRAM(S): 2.5 TABLET ORAL at 14:33

## 2023-12-21 RX ADMIN — GABAPENTIN 800 MILLIGRAM(S): 400 CAPSULE ORAL at 06:19

## 2023-12-21 RX ADMIN — OXCARBAZEPINE 300 MILLIGRAM(S): 300 TABLET, FILM COATED ORAL at 06:19

## 2023-12-21 RX ADMIN — Medication 81 MILLIGRAM(S): at 11:31

## 2023-12-21 RX ADMIN — PANTOPRAZOLE SODIUM 40 MILLIGRAM(S): 20 TABLET, DELAYED RELEASE ORAL at 06:19

## 2023-12-21 RX ADMIN — OXYCODONE AND ACETAMINOPHEN 1 TABLET(S): 5; 325 TABLET ORAL at 12:31

## 2023-12-21 RX ADMIN — GABAPENTIN 800 MILLIGRAM(S): 400 CAPSULE ORAL at 00:35

## 2023-12-21 RX ADMIN — OXYCODONE AND ACETAMINOPHEN 1 TABLET(S): 5; 325 TABLET ORAL at 11:31

## 2023-12-21 RX ADMIN — GABAPENTIN 800 MILLIGRAM(S): 400 CAPSULE ORAL at 11:31

## 2023-12-21 RX ADMIN — Medication 0.5 MILLIGRAM(S): at 02:10

## 2023-12-21 NOTE — DISCHARGE NOTE PROVIDER - NSDCCPCAREPLAN_GEN_ALL_CORE_FT
PRINCIPAL DISCHARGE DIAGNOSIS  Diagnosis: Chest pain  Assessment and Plan of Treatment: You were evaluated for chest pain in the hospital.   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   Avoid strenuous activity or heavy lifting anything more than 5lbs for the next five days. Do not take a bath or swim for the next five days; you may shower. For any bleeding or hematoma formation (hardened blood collection under the skin) at the access site of your right wrist please hold pressure and go to the emergency room. Please follow up with Dr. Wilkinson in 1-2 weeks. For recurrent chest pain, please call your doctor or go to the emergency room.      SECONDARY DISCHARGE DIAGNOSES  Diagnosis: Chronic abdominal pain  Assessment and Plan of Treatment: Please continue taking your medications as prescribed for your chronic pain. Please call your pain management specialist Dr. Kang Kim [phone] to schedule an appointment to discuss your pain management regimen. If you are unable to see your specialist you may also call Dr. Valentino Chappell, a pain management specialist, at [phone].    Diagnosis: IBS (irritable bowel syndrome)  Assessment and Plan of Treatment: Please continue taking your Linzess and hyoscyamine as prescribed for treatment of your irritable bowel syndrome (IBS) and please follow up with your primary care provider for further evaluation. Please return to ER with any new or worsening symptoms.    Diagnosis: Neuropathy  Assessment and Plan of Treatment: Please continue your medications as prescribed for your neuropathy and please follow up with pain management specialist; please call the office to make an appointment.   - You may call your pain management specialist or an alternate pain management specialist as listed above for further evaluation and treatment. Please return to ED with any new, concerning, or worsening symptoms.    Diagnosis: Essential hypertension  Assessment and Plan of Treatment: Please continue Metoprolol succinate 25 mg daily and Amlodipine 5 mg daily as listed to keep your blood pressure controlled. For blood pressure that is too high or too low please see your doctor or go to the emergency room as necessary.    Diagnosis: Hyponatremia  Assessment and Plan of Treatment: You had low sodium while in the hospital which resolved with fluids. Please stay well-hydrated and follow up with your primary care provider for further evaluation.     PRINCIPAL DISCHARGE DIAGNOSIS  Diagnosis: Chest pain  Assessment and Plan of Treatment: You were evaluated for chest pain in the hospital.   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   Avoid strenuous activity or heavy lifting anything more than 5lbs for the next five days. Do not take a bath or swim for the next five days; you may shower. For any bleeding or hematoma formation (hardened blood collection under the skin) at the access site of your right wrist please hold pressure and go to the emergency room. Please follow up with Dr. Wilkinson in 1-2 weeks. For recurrent chest pain, please call your doctor or go to the emergency room.      SECONDARY DISCHARGE DIAGNOSES  Diagnosis: Chronic abdominal pain  Assessment and Plan of Treatment: Please continue taking your medications as prescribed for your chronic pain. Please call your pain management specialist Dr. Kang Kim [phone] to schedule an appointment to discuss your pain management regimen. If you are unable to see your specialist you may also call Dr. Valentino Chappell, a pain management specialist, at [phone].    Diagnosis: IBS (irritable bowel syndrome)  Assessment and Plan of Treatment: Please continue taking your Linzess and hyoscyamine as prescribed for treatment of your irritable bowel syndrome (IBS) and please follow up with your primary care provider for further evaluation. Please return to ER with any new or worsening symptoms.    Diagnosis: Neuropathy  Assessment and Plan of Treatment: Please continue your medications as prescribed for your neuropathy and please follow up with pain management specialist; please call the office to make an appointment.   - You may call your pain management specialist or an alternate pain management specialist as listed above for further evaluation and treatment. Please return to ED with any new, concerning, or worsening symptoms.    Diagnosis: Essential hypertension  Assessment and Plan of Treatment: Please continue Metoprolol succinate 25 mg daily and Amlodipine 5 mg daily as listed to keep your blood pressure controlled. For blood pressure that is too high or too low please see your doctor or go to the emergency room as necessary.    Diagnosis: Hyponatremia  Assessment and Plan of Treatment: You had low sodium while in the hospital which resolved with fluids. Please stay well-hydrated and follow up with your primary care provider for further evaluation.     PRINCIPAL DISCHARGE DIAGNOSIS  Diagnosis: Chest pain  Assessment and Plan of Treatment: You were evaluated for chest pain in the hospital.   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 This is when the heart muscle covers the coronary artery; this can cause chest pain when the heart is carl. Please START taking Metoprolol succinate 25 mg daily and amlodipine 5 mg daily; these medications will help relax the heart muscle and should reduce your chest pain. If you experience any new, concerning, or worsening chest pain or other symptoms please follow up with your cardiologist or return to the ED immediately.     Avoid strenuous activity or heavy lifting anything more than 5lbs for the next five days. Do not take a bath or swim for the next five days; you may shower. For any bleeding or hematoma formation (hardened blood collection under the skin) at the access site of your right wrist please hold pressure and go to the emergency room. Please follow up with Dr. Wilkinson in 1-2 weeks. For recurrent chest pain, please call your doctor or go to the emergency room.      SECONDARY DISCHARGE DIAGNOSES  Diagnosis: Pericardial effusion  Assessment and Plan of Treatment: Your echocardiogram (ultrasound of your heart) revealed a moderate pericardial effusion, a fluid collection within the lining of your heart. It is stable at this time and will require repeat echocardiogram to be arranged by your cardiologist in the office. Should you have any worsening symptoms of chest pain, shortness of breath, or other concerning symptoms, you should report to emergency room immediately.    Diagnosis: Chronic abdominal pain  Assessment and Plan of Treatment: Please continue taking your medications as prescribed for your chronic pain. Please continue to see your pain management specialist Dr. Kang Kim [phone] to schedule an appointment to discuss your pain management regimen. If you wish to switch to another physician you may call and make an appointment with Dr. Valentino Chappell, a pain management specialist, at [phone].    Diagnosis: IBS (irritable bowel syndrome)  Assessment and Plan of Treatment: Please continue taking your Linzess and hyoscyamine as prescribed for treatment of your irritable bowel syndrome (IBS) and please follow up with your primary care provider and/or gastroenterologist for further evaluation. Please return to ER with any new or worsening symptoms.    Diagnosis: Neuropathy  Assessment and Plan of Treatment: Please continue your medications as prescribed for your neuropathy and please follow up with pain management specialist; please call the office to make an appointment.   - You may call your pain management specialist or an alternate pain management specialist as listed above for further evaluation and treatment. Please return to ED with any new, concerning, or worsening symptoms.    Diagnosis: Essential hypertension  Assessment and Plan of Treatment: Please continue Metoprolol succinate 25 mg daily and Amlodipine 5 mg daily as listed to keep your blood pressure controlled. For blood pressure that is too high or too low please see your doctor or go to the emergency room as necessary.    Diagnosis: Hyponatremia  Assessment and Plan of Treatment: You had low sodium while in the hospital which resolved with fluids. Please stay hydrated and follow up with your primary care provider for further evaluation and for repeat labs.     PRINCIPAL DISCHARGE DIAGNOSIS  Diagnosis: Chest pain  Assessment and Plan of Treatment: You were evaluated for chest pain in the hospital.   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 This is when the heart muscle covers the coronary artery; this can cause chest pain when the heart is carl. Please START taking Metoprolol succinate 25 mg daily and amlodipine 5 mg daily; these medications will help relax the heart muscle and should reduce your chest pain. If you experience any new, concerning, or worsening chest pain or other symptoms please follow up with your cardiologist or return to the ED immediately.     Avoid strenuous activity or heavy lifting anything more than 5lbs for the next five days. Do not take a bath or swim for the next five days; you may shower. For any bleeding or hematoma formation (hardened blood collection under the skin) at the access site of your right wrist please hold pressure and go to the emergency room. Please follow up with Dr. Wilkinson in 1-2 weeks. For recurrent chest pain, please call your doctor or go to the emergency room.      SECONDARY DISCHARGE DIAGNOSES  Diagnosis: Pericardial effusion  Assessment and Plan of Treatment: Your echocardiogram (ultrasound of your heart) revealed a moderate pericardial effusion, a fluid collection within the lining of your heart. It is stable at this time and will require repeat echocardiogram to be arranged by your cardiologist in the office. Should you have any worsening symptoms of chest pain, shortness of breath, or other concerning symptoms, you should report to emergency room immediately.    Diagnosis: Chronic abdominal pain  Assessment and Plan of Treatment: Please continue taking your medications as prescribed for your chronic pain. Please continue to see your pain management specialist Dr. Kang Kim [phone] to schedule an appointment to discuss your pain management regimen. If you wish to switch to another physician you may call and make an appointment with Dr. Valentino Chappell, a pain management specialist, at [phone].    Diagnosis: IBS (irritable bowel syndrome)  Assessment and Plan of Treatment: Please continue taking your Linzess and hyoscyamine as prescribed for treatment of your irritable bowel syndrome (IBS) and please follow up with your primary care provider and/or gastroenterologist for further evaluation. Please return to ER with any new or worsening symptoms.    Diagnosis: Neuropathy  Assessment and Plan of Treatment: Please continue your medications as prescribed for your neuropathy and please follow up with pain management specialist; please call the office to make an appointment.   - You may call your pain management specialist or an alternate pain management specialist as listed above for further evaluation and treatment. Please return to ED with any new, concerning, or worsening symptoms.    Diagnosis: Essential hypertension  Assessment and Plan of Treatment: Please continue Metoprolol succinate 25 mg daily and Amlodipine 5 mg daily as listed to keep your blood pressure controlled. For blood pressure that is too high or too low please see your doctor or go to the emergency room as necessary.    Diagnosis: Hyponatremia  Assessment and Plan of Treatment: You had low sodium while in the hospital which resolved with fluids. Please stay hydrated and follow up with your primary care provider for further evaluation and for repeat labs.

## 2023-12-21 NOTE — DISCHARGE NOTE NURSING/CASE MANAGEMENT/SOCIAL WORK - PATIENT PORTAL LINK FT
You can access the FollowMyHealth Patient Portal offered by [facility] by registering at the following website: [email or web address]. By joining [employer]’s FollowMyHealth portal, you will also be able to view your health information using other applications (apps) compatible with our system. You can access the FollowMyHealth Patient Portal offered by [facility] by registering at the following website: [email or web address]. By joining [employer]’s FollowMyHealth portal, you will also be able to view your health information using other applications (apps) compatible with our system.

## 2023-12-21 NOTE — PHYSICAL THERAPY INITIAL EVALUATION ADULT - ADDITIONAL COMMENTS
Pt states she live with her  in 2nd floor walk up apartment. Pt was independent with ADLs and amb PTA denies use of AD. has had several falls in the past.

## 2023-12-21 NOTE — DISCHARGE NOTE PROVIDER - HOSPITAL COURSE
Patient will require a cane due to diagnosis of CAD, to help complete mobility related ADLs. Patient will require a cane due to diagnosis of Takotsubo Cardiomyopathy to help complete mobility related ADLs. Patient will require a cane due to diagnosis of myocardial bridge and moderate pericardial effusion to help complete mobility related ADLs.    part of w/u, as effusion of mod size, will be monitored on outpatient basis ----- put in care plan ----- will be monitored OP w/ repeat echo timing TBD by outpatient cardio,ogist freturn to eD if suddenly SOB/feel faint Patient will require a cane due to diagnosis of myocardial bridge and moderate pericardial effusion to help complete mobility related ADLs.    part of w/u, as effusion of mod size, will be monitored on outpatient basis ----- put in care plan ----- will be monitored OP w/ repeat echo timing TBD by outpatient cardio,ogist freturn to eD if suddenly SOB/feel faint     lipid panel high atorva outpatinet       hyponatremia outpatinet 64 y/o female with PMHx  of  Depression/Anxiety, Hypertension (not on medication),  Neuropathy, chronic chest/abdominal pain (pt. is followed by Neurologist and Pain Management; recently tried Ketamine therapy), IBS,  Hyponatremia presents with chest pain; reports two years hx chronic chest pain/abd pain and neuropathy for which she follows with neurologist and pain management specialist. Patient was started on heparin gtt and underwent TTE and cardiac catheterization for further evaluation.     TTE (12/20/23): LVEF 55%, mild symmetric LVH, moderate pericardial effusion w/o evidence of tamponade. This will be monitored on outpatient basis in cardiology office as discussed with Dr. Wilkinson.     Cardiac cath (12/20/23): normal coronaries. Focal anterior wall takotsubo also w/ myocardial bridging. EF 45-50% by echo. EDP 18 mmHg. Access: right radial.     Patient started on amlodipine 5 mg QD and Toprol XL 25 mg QD for myocardial bridging; pt also with moderate pericardial effusion; discussed with Dr. Wilkinson.     Lipid panel w/ elevated LDL, as discussed with Dr. Wilkinson statin may be initiated on outpatient basis.     Patient presented with hyponatremia that resolved w/ IV fluids; pt reports hx of same and understands to follow up in PCP office.     No significant events on telemetry overnight. Patient has been medically cleared for discharge as per Dr. Wilkinson. Patient has been given appropriate discharge instructions including medication regimen, access site management and follow up. Medications that patient needs refills on have been e-prescribed to preferred pharmacy.     VSS  Gen: NAD, A&O x3  Cards: RRR, clear S1 and S2 without murmur  Pulm: CTA B/L without w/r/r  Abd: soft, NT  Ext: no LE edema or ulcerations B/L 62 y/o female with PMHx  of  Depression/Anxiety, Hypertension (not on medication),  Neuropathy, chronic chest/abdominal pain (pt. is followed by Neurologist and Pain Management; recently tried Ketamine therapy), IBS,  Hyponatremia presents with chest pain; reports two years hx chronic chest pain/abd pain and neuropathy for which she follows with neurologist and pain management specialist. Patient was started on heparin gtt and underwent TTE and cardiac catheterization for further evaluation.     TTE (12/20/23): LVEF 55%, mild symmetric LVH, moderate pericardial effusion w/o evidence of tamponade. This will be monitored on outpatient basis in cardiology office as discussed with Dr. Wilkinson.     Cardiac cath (12/20/23): normal coronaries. Focal anterior wall takotsubo also w/ myocardial bridging. EF 45-50% by echo. EDP 18 mmHg. Access: right radial.     Patient started on amlodipine 5 mg QD and Toprol XL 25 mg QD for myocardial bridging; pt also with moderate pericardial effusion; discussed with Dr. Wilkinson.     Lipid panel w/ elevated LDL, as discussed with Dr. Wilkinson statin may be initiated on outpatient basis.     Patient presented with hyponatremia that resolved w/ IV fluids; pt reports hx of same and understands to follow up in PCP office.     No significant events on telemetry overnight. Patient has been medically cleared for discharge as per Dr. Wilkinson. Patient has been given appropriate discharge instructions including medication regimen, access site management and follow up. Medications that patient needs refills on have been e-prescribed to preferred pharmacy.     VSS  Gen: NAD, A&O x3  Cards: RRR, clear S1 and S2 without murmur  Pulm: CTA B/L without w/r/r  Abd: soft, NT  Ext: no LE edema or ulcerations B/L 64 y/o female with PMHx  of  Depression/Anxiety, Hypertension (not on medication),  Neuropathy, chronic chest/abdominal pain (pt. is followed by Neurologist and Pain Management; recently tried Ketamine therapy), IBS,  Hyponatremia presents with chest pain; reports two years hx chronic chest pain/abd pain and neuropathy for which she follows with neurologist and pain management specialist. Patient was started on heparin gtt and underwent TTE and cardiac catheterization for further evaluation.     TTE (12/20/23): LVEF 55%, mild symmetric LVH, moderate pericardial effusion w/o evidence of tamponade. This will be monitored on outpatient basis in cardiology office as discussed with Dr. Wilkinson.     Cardiac cath (12/20/23): normal coronaries. Focal anterior wall takotsubo also w/ myocardial bridging. EF 45-50% by echo. EDP 18 mmHg. Access: right radial.     Patient started on amlodipine 5 mg QD and Toprol XL 25 mg QD for myocardial bridging; pt also with moderate pericardial effusion; discussed with Dr. Wilkinson. Referred to pain management specialist for further evaluation.     As discussed with Dr. Wilkinson statin may be initiated on outpatient basis.     Patient presented with hyponatremia (asymptomatic) that resolved w/ IV fluids; pt reports hx of same and understands to follow up in PCP office.     No significant events on telemetry overnight. Patient has been medically cleared for discharge as per Dr. Wilkinson. Patient has been given appropriate discharge instructions including medication regimen, access site management and follow up. Medications that patient needs refills on have been e-prescribed to preferred pharmacy.     VSS  Gen: NAD, A&O x3  Cards: RRR, clear S1 and S2 without murmur  Pulm: CTA B/L without w/r/r  Abd: soft, NT  Ext: no LE edema or ulcerations B/L 62 y/o female with PMHx  of  Depression/Anxiety, Hypertension (not on medication),  Neuropathy, chronic chest/abdominal pain (pt. is followed by Neurologist and Pain Management; recently tried Ketamine therapy), IBS,  Hyponatremia presents with chest pain; reports two years hx chronic chest pain/abd pain and neuropathy for which she follows with neurologist and pain management specialist. Patient was started on heparin gtt and underwent TTE and cardiac catheterization for further evaluation.     TTE (12/20/23): LVEF 55%, mild symmetric LVH, moderate pericardial effusion w/o evidence of tamponade. This will be monitored on outpatient basis in cardiology office as discussed with Dr. Wilkinson.     Cardiac cath (12/20/23): normal coronaries. Focal anterior wall takotsubo also w/ myocardial bridging. EF 45-50% by echo. EDP 18 mmHg. Access: right radial.     Patient started on amlodipine 5 mg QD and Toprol XL 25 mg QD for myocardial bridging; pt also with moderate pericardial effusion; discussed with Dr. Wilkinson. Referred to pain management specialist for further evaluation.     As discussed with Dr. Wilkinson statin may be initiated on outpatient basis.     Patient presented with hyponatremia (asymptomatic) that resolved w/ IV fluids; pt reports hx of same and understands to follow up in PCP office.     No significant events on telemetry overnight. Patient has been medically cleared for discharge as per Dr. Wilkinson. Patient has been given appropriate discharge instructions including medication regimen, access site management and follow up. Medications that patient needs refills on have been e-prescribed to preferred pharmacy.     VSS  Gen: NAD, A&O x3  Cards: RRR, clear S1 and S2 without murmur  Pulm: CTA B/L without w/r/r  Abd: soft, NT  Ext: no LE edema or ulcerations B/L

## 2023-12-21 NOTE — DISCHARGE NOTE NURSING/CASE MANAGEMENT/SOCIAL WORK - CAREGIVER ADDRESS
[id number] [place] [address],  [place], [address] [id number] [place] [address],  [place], [address]

## 2023-12-21 NOTE — PHYSICAL THERAPY INITIAL EVALUATION ADULT - PERTINENT HX OF CURRENT PROBLEM, REHAB EVAL
62 y/o female with PMHx  of  Depression/Anxiety, Hypertension (not on medication),  Neuropathy, chronic chest/abdominal pain (pt. is followed by Neurologist and Pain Management; recently tried Ketamine therapy), IBS,  Hyponatremia presents to [facility] ER this evening, 12/19/23, complaining of worsening midsternal chest pain, described as "sharp" radiating downward to lower abdomen with HA one episode of nbnb emesis this evening and reports diarrhea for one month. 64 y/o female with PMHx  of  Depression/Anxiety, Hypertension (not on medication),  Neuropathy, chronic chest/abdominal pain (pt. is followed by Neurologist and Pain Management; recently tried Ketamine therapy), IBS,  Hyponatremia presents to [facility] ER this evening, 12/19/23, complaining of worsening midsternal chest pain, described as "sharp" radiating downward to lower abdomen with HA one episode of nbnb emesis this evening and reports diarrhea for one month.

## 2023-12-21 NOTE — DISCHARGE NOTE PROVIDER - NSDCFUADDINST_GEN_ALL_CORE_FT
18
Please continue to follow a heart healthy diet, low in sodium, cholesterol and fats. We recommend a Mediterranean diet:  -Incorporate 2 or more servings per day of vegetables, fruits, and whole grains  -Increase intake of fish and legumes/beans to 2 or more servings per week  -Increase intake of healthy fats, such as olive oil and avocados, and have a handful of nuts/seeds most days  -Reduce red/processed meat consumption to 2 or fewer times per week

## 2023-12-21 NOTE — DISCHARGE NOTE PROVIDER - PROVIDER TOKENS
PROVIDER:[TOKEN:[[id number]],FOLLOWUP:[1 week]],FREE:[LAST:[Park],FIRST:[Marjorie Montalvo],PHONE:[phone]],FAX:[(   )    -],ADDRESS:[[address] [place] [address]],SCHEDULEDAPPT:[12/29/2023],SCHEDULEDAPPTTIME:[09:45 AM]] PROVIDER:[TOKEN:[[id number]],FOLLOWUP:[1 week]],FREE:[LAST:[Park],FIRST:[Marjorie Montalvo],PHONE:[phone]],FAX:[(   )    -],ADDRESS:[[address] [place] [address]],SCHEDULEDAPPT:[12/29/2023],SCHEDULEDAPPTTIME:[09:45 AM]]

## 2023-12-21 NOTE — PHYSICAL THERAPY INITIAL EVALUATION ADULT - GENERAL OBSERVATIONS, REHAB EVAL
Pt received semi supine in bed +hep lock +tele. ALFONZO guzman aware of intent to treat. pt is post cardiac cath on 12/20 tolerated session well amb with hand held assist unsteady when negotiating turns negotiated steps with L hand rail. Pt was left as received with call bell in reach, VSS, and in nAD.

## 2023-12-21 NOTE — DISCHARGE NOTE NURSING/CASE MANAGEMENT/SOCIAL WORK - NSTOBACCONEVERSMOKERY/N_GEN_A
DMV DM form    LAST OFFICE/VIRTUAL VISIT:  02/04/20    FUTURE OFFICE/VIRTUAL VISIT:  None    Lab Results   Component Value Date    A1C 7.0 02/03/2020    A1C 7.6 03/20/2018    A1C 8.0 06/15/2017    A1C 8.5 12/01/2016    A1C 7.8 06/13/2016         Estrella Laura CMA  [facility]  Karena/Lila       No

## 2023-12-21 NOTE — DISCHARGE NOTE PROVIDER - NSDCMRMEDTOKEN_GEN_ALL_CORE_FT
clonazePAM 0.5 mg oral tablet: 1 tab(s) orally 3 times a day  gabapentin 400 mg oral tablet: 1 tab(s) orally 4 times a day  gabapentin 800 mg oral tablet: 1 tab(s) orally 7 times a day  hyoscyamine 0.125 mg oral tablet: 1 tab(s) orally 3 times a day  Linzess 145 mcg oral capsule: 1 cap(s) orally once a day  Metoprolol Succinate ER 25 mg oral tablet, extended release: 1 tab(s) orally once a day  Norvasc 5 mg oral tablet: 1 tab(s) orally once  OXcarbazepine 300 mg oral tablet: 1 tab(s) orally 4 times a day  Trelegy Ellipta 100 mcg-62.5 mcg-25 mcg/inh inhalation powder: 1 puff(s) inhaled as needed for  bronchospasm

## 2023-12-21 NOTE — DISCHARGE NOTE NURSING/CASE MANAGEMENT/SOCIAL WORK - NSDCPEFALRISK_GEN_ALL_CORE
For information on Fall & Injury Prevention, visit: [email or web address]://www.[email or web address]/[email or web address]-[email or web address]-[email or web address]-[email or web address]-health-and-mobility OR  https://www.[facility]/[email or web address]-prevention-tips-to-avoid-injury OR  https://www.cdc.gov/steadi/patient.html For information on Fall & Injury Prevention, visit: https://www.[facility]/news/fall-prevention-protects-and-maintains-health-and-mobility OR  https://www.[facility]/news/fall-prevention-tips-to-avoid-injury OR  https://www.cdc.gov/steadi/patient.html

## 2023-12-21 NOTE — DISCHARGE NOTE PROVIDER - CARE PROVIDER_API CALL
Valentino Chappell  Pain Medicine  [address] [place] [address], [address]  [place] [address]  Phone: [phone]  Fax: [phone]  Follow Up Time: 1 week    Marjorie Wilkinson  [address] [place] [address]  Phone: [phone]  Fax: (   )    -  Scheduled Appointment: 12/29/2023 09:45 AM   Valentino Chappell  Pain Medicine  [address] [place] [address], [address]  [place] [address]  Phone: [phone]  Fax: [phone]  Follow Up Time: 1 week    Marjorie Wilkinson  [address] [place] [address]  Phone: [phone]  Fax: (   )    -  Scheduled Appointment: 12/29/2023 09:45 AM

## 2023-12-21 NOTE — DISCHARGE NOTE PROVIDER - NSDCFUSCHEDAPPT_GEN_ALL_CORE_FT
Marjorie Wilkinson  [facility] Physician [facility]  [facility] [address]  Scheduled Appointment: 12/29/2023     Marjorie Wilkinson  [facility] Physician [facility]  [facility] [address]  Scheduled Appointment: 12/29/2023

## 2023-12-22 PROCEDURE — 0225U NFCT DS DNA&RNA 21 SARSCOV2: CPT

## 2023-12-22 PROCEDURE — 86140 C-REACTIVE PROTEIN: CPT

## 2023-12-22 PROCEDURE — C1769: CPT

## 2023-12-22 PROCEDURE — 82550 ASSAY OF CK (CPK): CPT

## 2023-12-22 PROCEDURE — 83735 ASSAY OF MAGNESIUM: CPT

## 2023-12-22 PROCEDURE — 80053 COMPREHEN METABOLIC PANEL: CPT

## 2023-12-22 PROCEDURE — 82553 CREATINE MB FRACTION: CPT

## 2023-12-22 PROCEDURE — 36415 COLL VENOUS BLD VENIPUNCTURE: CPT

## 2023-12-22 PROCEDURE — 87015 SPECIMEN INFECT AGNT CONCNTJ: CPT

## 2023-12-22 PROCEDURE — 87116 MYCOBACTERIA CULTURE: CPT

## 2023-12-22 PROCEDURE — 82436 ASSAY OF URINE CHLORIDE: CPT

## 2023-12-22 PROCEDURE — 84133 ASSAY OF URINE POTASSIUM: CPT

## 2023-12-22 PROCEDURE — 93005 ELECTROCARDIOGRAM TRACING: CPT

## 2023-12-22 PROCEDURE — C1887: CPT

## 2023-12-22 PROCEDURE — 86803 HEPATITIS C AB TEST: CPT

## 2023-12-22 PROCEDURE — 96361 HYDRATE IV INFUSION ADD-ON: CPT

## 2023-12-22 PROCEDURE — 71045 X-RAY EXAM CHEST 1 VIEW: CPT

## 2023-12-22 PROCEDURE — 85610 PROTHROMBIN TIME: CPT

## 2023-12-22 PROCEDURE — 84100 ASSAY OF PHOSPHORUS: CPT

## 2023-12-22 PROCEDURE — 96374 THER/PROPH/DIAG INJ IV PUSH: CPT

## 2023-12-22 PROCEDURE — 84300 ASSAY OF URINE SODIUM: CPT

## 2023-12-22 PROCEDURE — 84484 ASSAY OF TROPONIN QUANT: CPT

## 2023-12-22 PROCEDURE — C1894: CPT

## 2023-12-22 PROCEDURE — 93306 TTE W/DOPPLER COMPLETE: CPT

## 2023-12-22 PROCEDURE — 83036 HEMOGLOBIN GLYCOSYLATED A1C: CPT

## 2023-12-22 PROCEDURE — 96375 TX/PRO/DX INJ NEW DRUG ADDON: CPT

## 2023-12-22 PROCEDURE — 87206 SMEAR FLUORESCENT/ACID STAI: CPT

## 2023-12-22 PROCEDURE — 85730 THROMBOPLASTIN TIME PARTIAL: CPT

## 2023-12-22 PROCEDURE — 84443 ASSAY THYROID STIM HORMONE: CPT

## 2023-12-22 PROCEDURE — 83690 ASSAY OF LIPASE: CPT

## 2023-12-22 PROCEDURE — 83880 ASSAY OF NATRIURETIC PEPTIDE: CPT

## 2023-12-22 PROCEDURE — 81003 URINALYSIS AUTO W/O SCOPE: CPT

## 2023-12-22 PROCEDURE — 85652 RBC SED RATE AUTOMATED: CPT

## 2023-12-22 PROCEDURE — 85025 COMPLETE CBC W/AUTO DIFF WBC: CPT

## 2023-12-22 PROCEDURE — 99285 EMERGENCY DEPT VISIT HI MDM: CPT | Mod: 25

## 2023-12-29 DIAGNOSIS — I51.81 TAKOTSUBO SYNDROME: ICD-10-CM

## 2023-12-29 DIAGNOSIS — K58.0 IRRITABLE BOWEL SYNDROME WITH DIARRHEA: ICD-10-CM

## 2023-12-29 DIAGNOSIS — I20.89 OTHER FORMS OF ANGINA PECTORIS: ICD-10-CM

## 2023-12-29 DIAGNOSIS — I31.39 OTHER PERICARDIAL EFFUSION (NONINFLAMMATORY): ICD-10-CM

## 2023-12-29 DIAGNOSIS — Q24.5 MALFORMATION OF CORONARY VESSELS: ICD-10-CM

## 2023-12-29 DIAGNOSIS — R10.9 UNSPECIFIED ABDOMINAL PAIN: ICD-10-CM

## 2023-12-29 DIAGNOSIS — G62.9 POLYNEUROPATHY, UNSPECIFIED: ICD-10-CM

## 2023-12-29 DIAGNOSIS — I21.4 NON-ST ELEVATION (NSTEMI) MYOCARDIAL INFARCTION: ICD-10-CM

## 2023-12-29 DIAGNOSIS — G89.4 CHRONIC PAIN SYNDROME: ICD-10-CM

## 2023-12-29 DIAGNOSIS — F32.A DEPRESSION, UNSPECIFIED: ICD-10-CM

## 2023-12-29 DIAGNOSIS — F41.9 ANXIETY DISORDER, UNSPECIFIED: ICD-10-CM

## 2023-12-29 DIAGNOSIS — R07.9 CHEST PAIN, UNSPECIFIED: ICD-10-CM

## 2023-12-29 DIAGNOSIS — E87.1 HYPO-OSMOLALITY AND HYPONATREMIA: ICD-10-CM

## 2024-01-05 ENCOUNTER — APPOINTMENT (OUTPATIENT)
Dept: HEART AND VASCULAR | Facility: CLINIC | Age: 64
End: 2024-01-05
Payer: MEDICAID

## 2024-01-05 VITALS
BODY MASS INDEX: 17.43 KG/M2 | DIASTOLIC BLOOD PRESSURE: 84 MMHG | OXYGEN SATURATION: 100 % | SYSTOLIC BLOOD PRESSURE: 148 MMHG | WEIGHT: 115 LBS | HEART RATE: 66 BPM | HEIGHT: 68 IN

## 2024-01-05 DIAGNOSIS — I31.39 OTHER PERICARDIAL EFFUSION (NONINFLAMMATORY): ICD-10-CM

## 2024-01-05 DIAGNOSIS — I10 ESSENTIAL (PRIMARY) HYPERTENSION: ICD-10-CM

## 2024-01-05 PROBLEM — Z00.00 ENCOUNTER FOR PREVENTIVE HEALTH EXAMINATION: Status: ACTIVE | Noted: 2024-01-05

## 2024-01-05 PROCEDURE — 99204 OFFICE O/P NEW MOD 45 MIN: CPT | Mod: 25

## 2024-01-05 PROCEDURE — 93000 ELECTROCARDIOGRAM COMPLETE: CPT

## 2024-01-05 RX ORDER — METOPROLOL SUCCINATE 25 MG/1
25 TABLET, EXTENDED RELEASE ORAL DAILY
Qty: 60 | Refills: 0 | Status: ACTIVE | COMMUNITY
Start: 2024-01-05 | End: 1900-01-01

## 2024-01-05 NOTE — HISTORY OF PRESENT ILLNESS
[FreeTextEntry1] : 62 yo lady PMHx of HTN, anxiety, chronic pain syndrome, and IBS who was here as a new patient.  [facility] hospitalization 12/19-12/21 for chest pain, s/p LHC found to have myocardial bridging of mLAD and anterolateral hypokinesis c/w variant stress induced CM. TTE revealed normal BiV function, mild LVH, and moderate pericardial effusion.  ROS negative  PMHx/PSHx as above Social hx no substance use FMHx none contributory

## 2024-01-05 NOTE — DISCUSSION/SUMMARY
[FreeTextEntry1] : 64 yo lady PMHx of HTN, anxiety, chronic pain syndrome, and IBS who was here as a new patient.  Assessment 1.Myocardial bridging of mLAD 2. Variant stress induced CM with normal EF 3. HTN 4. Moderate pericardial effusion  Plan 1. Metoprolol succinate 25mg PO QD for negative inotropic effect for stress induced CM 2. Amlodipine 5mg PO QD tmrw am for myocardial bridging of LAD 3. RTC 1mo with repeat TTE   During non face-to-face time, I reviewed relevant portions of the patient's medical record. During face-to-face time, I took a relevant history and examined the patient. I also explained differential diagnoses, relevant cardiac diagnoses, workup, and management plan, which required a moderate level of medical decision making. I answered all questions related to the patient's medical conditions.   Marjorie Wilkinson M.D. Attending Cardiologist [facility]  [EKG obtained to assist in diagnosis and management of assessed problem(s)] : EKG obtained to assist in diagnosis and management of assessed problem(s)

## 2024-01-11 NOTE — H&P ADULT - PROBLEM SELECTOR PLAN 1
[Nutrition/ Exercise/ Weight Management] : nutrition, exercise, weight management [Breast Self Exam] : breast self exam p/w syncope lightheadedness  orthostatic bp  f/u echo   PT consult  f/u EKG  f/u UA  f/u CT head   Cardio consulted: p/w syncope lightheadedness  orthostatic bp  f/u echo   PT consult  f/u EKG  f/u UA  f/u CT head   Cardio consulted: Dr. Castañeda.

## 2024-01-18 RX ORDER — HYOSCYAMINE SULFATE 0.13 MG
1 TABLET ORAL
Refills: 0 | DISCHARGE

## 2024-01-18 RX ORDER — FLUTICASONE FUROATE, UMECLIDINIUM BROMIDE AND VILANTEROL TRIFENATATE 200; 62.5; 25 UG/1; UG/1; UG/1
1 POWDER RESPIRATORY (INHALATION)
Refills: 0 | DISCHARGE

## 2024-01-18 RX ORDER — CLONAZEPAM 1 MG
1 TABLET ORAL
Refills: 0 | DISCHARGE

## 2024-01-18 RX ORDER — OXCARBAZEPINE 300 MG/1
1 TABLET, FILM COATED ORAL
Refills: 0 | DISCHARGE

## 2024-01-18 RX ORDER — GABAPENTIN 400 MG/1
1 CAPSULE ORAL
Refills: 0 | DISCHARGE

## 2024-01-18 RX ORDER — LINACLOTIDE 145 UG/1
1 CAPSULE, GELATIN COATED ORAL
Refills: 0 | DISCHARGE

## 2024-02-07 LAB
CULTURE RESULTS: SIGNIFICANT CHANGE UP
SPECIMEN SOURCE: SIGNIFICANT CHANGE UP

## 2024-02-27 ENCOUNTER — NON-APPOINTMENT (OUTPATIENT)
Age: 64
End: 2024-02-27

## 2024-02-27 ENCOUNTER — APPOINTMENT (OUTPATIENT)
Dept: HEART AND VASCULAR | Facility: CLINIC | Age: 64
End: 2024-02-27
Payer: MEDICAID

## 2024-02-27 VITALS
SYSTOLIC BLOOD PRESSURE: 140 MMHG | OXYGEN SATURATION: 97 % | HEIGHT: 68 IN | WEIGHT: 117 LBS | DIASTOLIC BLOOD PRESSURE: 80 MMHG | TEMPERATURE: 97.5 F | HEART RATE: 68 BPM | BODY MASS INDEX: 17.73 KG/M2

## 2024-02-27 DIAGNOSIS — Z82.49 FAMILY HISTORY OF ISCHEMIC HEART DISEASE AND OTHER DISEASES OF THE CIRCULATORY SYSTEM: ICD-10-CM

## 2024-02-27 DIAGNOSIS — F32.9 MAJOR DEPRESSIVE DISORDER, SINGLE EPISODE, UNSPECIFIED: ICD-10-CM

## 2024-02-27 PROBLEM — M54.9 DORSALGIA, UNSPECIFIED: Chronic | Status: ACTIVE | Noted: 2023-12-19

## 2024-02-27 PROBLEM — Z78.9 OTHER SPECIFIED HEALTH STATUS: Chronic | Status: ACTIVE | Noted: 2023-12-19

## 2024-02-27 PROBLEM — I10 ESSENTIAL (PRIMARY) HYPERTENSION: Chronic | Status: ACTIVE | Noted: 2023-12-19

## 2024-02-27 PROBLEM — F41.9 ANXIETY DISORDER, UNSPECIFIED: Chronic | Status: ACTIVE | Noted: 2023-12-19

## 2024-02-27 PROBLEM — I20.89 OTHER FORMS OF ANGINA PECTORIS: Chronic | Status: ACTIVE | Noted: 2023-12-19

## 2024-02-27 PROBLEM — G62.9 POLYNEUROPATHY, UNSPECIFIED: Chronic | Status: ACTIVE | Noted: 2023-12-19

## 2024-02-27 PROCEDURE — 93306 TTE W/DOPPLER COMPLETE: CPT

## 2024-02-27 PROCEDURE — 99214 OFFICE O/P EST MOD 30 MIN: CPT | Mod: 25

## 2024-02-27 PROCEDURE — 93000 ELECTROCARDIOGRAM COMPLETE: CPT

## 2024-02-27 PROCEDURE — G2211 COMPLEX E/M VISIT ADD ON: CPT | Mod: NC,1L

## 2024-02-27 RX ORDER — GABAPENTIN 800 MG/1
800 TABLET, COATED ORAL 4 TIMES DAILY
Refills: 0 | Status: ACTIVE | COMMUNITY

## 2024-02-27 RX ORDER — OXYCODONE HYDROCHLORIDE AND ACETAMINOPHEN 10; 325 MG/1; MG/1
10-325 TABLET ORAL 4 TIMES DAILY
Refills: 0 | Status: ACTIVE | COMMUNITY

## 2024-02-27 RX ORDER — AMLODIPINE BESYLATE 10 MG/1
10 TABLET ORAL DAILY
Qty: 90 | Refills: 3 | Status: ACTIVE | COMMUNITY
Start: 2024-01-05 | End: 1900-01-01

## 2024-02-27 RX ORDER — OXCARBAZEPINE 300 MG/1
300 TABLET, FILM COATED ORAL 3 TIMES DAILY
Refills: 0 | Status: ACTIVE | COMMUNITY

## 2024-02-27 RX ORDER — SERTRALINE HYDROCHLORIDE 100 MG/1
100 TABLET, FILM COATED ORAL DAILY
Qty: 90 | Refills: 0 | Status: ACTIVE | COMMUNITY
Start: 2024-02-27 | End: 1900-01-01

## 2024-02-27 NOTE — HISTORY OF PRESENT ILLNESS
Impression: Capslr glaucoma w/pseudoef lens, bilateral, mild stage
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
-- s/p Baerveldt Tube + K Patch OD - Dr Abimbola Dean - choroidals after opening -- 8/7/2020 OD tube Opened as of 9/11/2020 Plan: Pt has Glaucoma    Gonio :  CBB with 3+ pig    Pachs:   582/575   Today's IOP : 22/21 Tmax  :  48/37 Target IOP 20 OU B-Scan done (11/5/19) No RD, no blood OD (Bscan performed 2/2 hyphema of unknown origin OD) Pt denies Family hx of Glaucoma Left eye is the better seeing eye HVF 24-2 (05/19/21) OU: Full C/D:0.2x0.2 OU healthy nerves 5/19/21 OCT : Stable OU (02/11/2021) 84, 85 Pt denies Sulfa Allergy   // Pt denies Lung /Heart dx No previous medications used Plan :
1. Cont: 
Brimonidine TID OS Timolol QAM OS 2. Patient's IOP is Stable. Visual field improved from previous. Patient to continue all medication as instructed. 
3. Return in 6 months for IOP check
[FreeTextEntry1] : 64 yo lady PMHx of HTN, myocardial bridging, anxiety, chronic pain syndrome, and IBS who is here for f/u  2/27/24: Reports dizziness and dyspnea Sunday in setting of -180/100. Now ROS negative.  1/5/24: ROS negative

## 2024-02-27 NOTE — DISCUSSION/SUMMARY
[FreeTextEntry1] : 64 yo lady PMHx of HTN, anxiety, chronic pain syndrome, myocardial bridging, and IBS who was here as a follow up.   EKG 2/27/24 NSR, iRBBB  Assessment 1. Myocardial bridging of mLAD 2. Variant stress induced CM with normal EF 3. HTN 4. Moderate pericardial effusion  Plan 1. Metoprolol succinate 25mg PO QD for negative inotropic effect for stress induced CM 2. Amlodipine -> increase to 10mg PO QD for myocardial bridging of LAD and HTN at home 3. TTE done today for surveillance of pericardial effusion, will review 4. RTC 3 mo  During non face-to-face time, I reviewed relevant portions of the patient's medical record. During face-to-face time, I took a relevant history and examined the patient. I also explained differential diagnoses, relevant cardiac diagnoses, workup, and management plan, which required a moderate level of medical decision making. I answered all questions related to the patient's medical conditions.   Marjorie Wilkinson M.D. Attending Cardiologist [facility]  [EKG obtained to assist in diagnosis and management of assessed problem(s)] : EKG obtained to assist in diagnosis and management of assessed problem(s)

## 2024-03-08 NOTE — DISCHARGE NOTE NURSING/CASE MANAGEMENT/SOCIAL WORK - NSCORESITESY/N_GEN_A_CORE_RD
"Pt called 03/08/2024  She is wondering if you can take the pre diabetic diagnosis off her chart, she said she had a recent test at a hospital and it was \"not even close to being diabetic\"  She also wanted me to let you know she got her Shingles shot sometime in 2023 and has the paperwork if you need it  Please advise on all, thanks much!  " Yes

## 2024-05-28 ENCOUNTER — APPOINTMENT (OUTPATIENT)
Dept: HEART AND VASCULAR | Facility: CLINIC | Age: 64
End: 2024-05-28

## 2024-06-14 NOTE — ED PROVIDER NOTE - WR ORDER DATE AND TIME 1
Assist RN: Patient discharged from [facility] ED to home ([facility]). Discharge teaching completed at bedside and patient signature obtained. Signature by this RN d/t non verbal stature at this time. All questions and concerns addressed. Follow up explained with return instructions. All patient belongings with pt at time of discharge. Patient taken in [facility] with [facility] at time of discharge to [facility]. Mode of transportation: [facility]   17-Jul-2022 10:27 None